# Patient Record
Sex: FEMALE | Race: WHITE | NOT HISPANIC OR LATINO | Employment: FULL TIME | URBAN - METROPOLITAN AREA
[De-identification: names, ages, dates, MRNs, and addresses within clinical notes are randomized per-mention and may not be internally consistent; named-entity substitution may affect disease eponyms.]

---

## 2017-01-09 ENCOUNTER — OFFICE VISIT (OUTPATIENT)
Dept: FAMILY MEDICINE | Facility: CLINIC | Age: 37
End: 2017-01-09
Payer: COMMERCIAL

## 2017-01-09 VITALS
SYSTOLIC BLOOD PRESSURE: 100 MMHG | RESPIRATION RATE: 16 BRPM | BODY MASS INDEX: 28.8 KG/M2 | DIASTOLIC BLOOD PRESSURE: 62 MMHG | HEART RATE: 78 BPM | WEIGHT: 171 LBS

## 2017-01-09 DIAGNOSIS — E03.9 ACQUIRED HYPOTHYROIDISM: ICD-10-CM

## 2017-01-09 LAB
HGB BLD-MCNC: 14 G/DL (ref 11.7–15.7)
TSH SERPL DL<=0.005 MIU/L-ACNC: 0.56 MU/L (ref 0.4–4)

## 2017-01-09 PROCEDURE — 84443 ASSAY THYROID STIM HORMONE: CPT | Mod: 90 | Performed by: PHYSICIAN ASSISTANT

## 2017-01-09 PROCEDURE — 00000218 ZZHCL STATISTIC OBHBG - HEMOGLOBIN: Performed by: PHYSICIAN ASSISTANT

## 2017-01-09 PROCEDURE — 36415 COLL VENOUS BLD VENIPUNCTURE: CPT | Performed by: PHYSICIAN ASSISTANT

## 2017-01-09 PROCEDURE — 99000 SPECIMEN HANDLING OFFICE-LAB: CPT | Performed by: PHYSICIAN ASSISTANT

## 2017-01-09 PROCEDURE — 99207 ZZC POST PARTUM EXAM: CPT | Performed by: PHYSICIAN ASSISTANT

## 2017-01-09 RX ORDER — LEVOTHYROXINE SODIUM 100 UG/1
100 TABLET ORAL DAILY
Qty: 90 TABLET | Refills: 3 | Status: SHIPPED | OUTPATIENT
Start: 2017-01-09 | End: 2018-01-10

## 2017-01-09 ASSESSMENT — PAIN SCALES - GENERAL: PAINLEVEL: NO PAIN (0)

## 2017-01-09 NOTE — PROGRESS NOTES
Quick Note:    Labs normal - continue current dose of Synthroid. I will send refills to your pharmacy.  ______

## 2017-01-09 NOTE — NURSING NOTE
"Chief Complaint   Patient presents with     Post Partum Exam       Initial /62 mmHg  Pulse 78  Resp 16  Wt 171 lb (77.565 kg)  LMP 02/09/2016 (Approximate) Estimated body mass index is 28.8 kg/(m^2) as calculated from the following:    Height as of 4/28/16: 5' 4.6\" (1.641 m).    Weight as of this encounter: 171 lb (77.565 kg).  BP completed using cuff size: regular  Heidy Brumfield CMA (AAMA)   "

## 2017-01-09 NOTE — PROGRESS NOTES
Shirlene is here for a 6-week postpartum checkup.    She had a repeat c/s of a viable boy, weight 7 pounds 7.2 oz., with no complications. Date of delivery was 16. Since delivery, she has not been breast feeding.  She has no signs of infection, bleeding or other complications.  She is not pregnant.  We discussed contraceptions and she has chosen tubal ligation - this was done with csection.      Post partum tubal: Yes  History of Gestational Diabetes? No  Type of Delivery:    Feeding Method:  Formula  If initiated breast feeding and stopped, how long did you breast feed?:  1 week    REVIEW OF SYSTEMS:  Ears/Nose/Throat: negative  Respiratory: negative  Cardiovascular: negative  Gastrointestinal: negative  Genitourinary: negative  Musculoskeletal: negative    Neurologic: negative   Skin: negative   Endocrine:  negative  Vagina: negative  Cervix: negative  Breasts: negative  Vulva: negative      EXAM:  /62 mmHg  Pulse 78  Resp 16  Wt 171 lb (77.565 kg)  LMP 2016 (Approximate)   Patient is in no acute distress.  Cooperative throughout visit.  No signs of self harming behaviours. Normal hygiene & dress. Eye contact normal. Speech/mentation normal.  HEENT: grossly normal.  NECK: no lymphadenopathy or thyroidomegaly.  LUNGS: CTA X 2, no rales or crackles.  BACK: No spinal or CVA tenderness.  HEART: RRR without murmurs clicks or gallops.  ABDOMEN: soft, non tender, good bowel sounds, without masses rebound, guarding or tenderness. Well healed pfannensteil incision.  PELVIC:  External genitalia; normal without lesion.   Speculum exam deferred.   Uterus: non pregnant in size, firm , mobile, no lesions,     Normal shape, position and consistency  Adnexa: non tender, without masses.    EXTREMITIES:  warm to touch, good pulses, no ankle edema or calf tenderness.  NEUROLOGIC: grossly normal.    ASSESSMENT:   6-week postpartum exam after csection.     Routine postpartum follow-up  Acquired  hypothyroidism      PLAN:    Discussed calcium intake, vitamins and supplements  Exercise encouraged  Follow up in 1 year  Hemoglobin obtained  TSH ordered - will adjust Synthroid as needed.    History of depression-she is doing very well postpartum and does not have any concerns today regarding postpartum depression.    Orders Placed This Encounter     OB HEMOGLOBIN     TSH with free T4 reflex       Chart documentation done in part with Dragon Voice recognition Software. Although reviewed after completion, some word and grammatical error may remain.  AVS given to patient upon discharge today.  Electronically signed by Latisha Lara PA-C  January 9, 2017  10:14 AM

## 2017-04-25 ENCOUNTER — HOSPITAL ENCOUNTER (EMERGENCY)
Facility: CLINIC | Age: 37
Discharge: HOME | End: 2017-04-25
Attending: PHYSICIAN ASSISTANT | Admitting: PHYSICIAN ASSISTANT
Payer: COMMERCIAL

## 2017-04-25 VITALS
BODY MASS INDEX: 28.64 KG/M2 | DIASTOLIC BLOOD PRESSURE: 84 MMHG | WEIGHT: 170 LBS | HEART RATE: 99 BPM | OXYGEN SATURATION: 100 % | SYSTOLIC BLOOD PRESSURE: 116 MMHG | TEMPERATURE: 98 F | RESPIRATION RATE: 16 BRPM

## 2017-04-25 DIAGNOSIS — R07.0 THROAT PAIN: ICD-10-CM

## 2017-04-25 PROCEDURE — 99213 OFFICE O/P EST LOW 20 MIN: CPT | Performed by: PHYSICIAN ASSISTANT

## 2017-04-25 PROCEDURE — 87081 CULTURE SCREEN ONLY: CPT | Performed by: PHYSICIAN ASSISTANT

## 2017-04-25 PROCEDURE — 99213 OFFICE O/P EST LOW 20 MIN: CPT

## 2017-04-25 NOTE — ED AVS SNAPSHOT
[facility] Emergency Department    [address] [address]    Phone:  [phone]    Fax:  [phone]                                       Shirlene Rodriguez   MRN: [id number]    Department:  [facility] Emergency Department   Date of Visit:  4/25/2017           After Visit Summary Signature Page     I have received my discharge instructions, and my questions have been answered. I have discussed any challenges I see with this plan with the nurse or doctor.    ..........................................................................................................................................  Patient/Patient Representative Signature      ..........................................................................................................................................  Patient Representative Print Name and Relationship to Patient    ..................................................               ................................................  Date                                            Time    ..........................................................................................................................................  Reviewed by Signature/Title    ...................................................              ..............................................  Date                                                            Time

## 2017-04-25 NOTE — DISCHARGE INSTRUCTIONS
No antibiotic indicated at this time. Will wait for throat culture.     Patient advised to call for any lab results (if obtained during visit) within 2-3 days.     Symptomatic treatment with fluids, rest, salt water gargles, and cool humidifier.  May use acetaminophen, ibuprofen prn.    Patient may return to work/school after 24 hours of antibiotic treatment and fever free for 24 hours.    Return to care if any worsening symptoms or if not improving ([place] may need to be ruled out if symptoms fail to improve).    Patient to go to Emergency Room if drooling, change in voice, difficulty swallowing or talking, or persistent fevers occur.      Patient voiced understanding of instructions given.

## 2017-04-25 NOTE — ED PROVIDER NOTES
History     Chief Complaint   Patient presents with     Pharyngitis     HPI  Shirlene Rodriguez  is a 37 year old female who is here today because of: Sore Throat.  The patient has had symptoms of fever, cough, earache, nausea, headache and myalgias.   Onset of symptoms was 3 days ago. Course of illness is worsening sore throat today.  Patient denies exposure to illness at home or work/school.   Patient denies vomiting, diarrhea, chest congestion, wheezing, hoarseness and fatigue  Treatment measures tried include acetaminophen, ibuprofen.  Patient states her son has bacterial conjunctivitis at home.     Problem list, Medication list, Allergies, and Medical/Social/Surgical histories reviewed in [facility] and updated as appropriate.      Review of Systems     Constitutional, HEENT, cardiovascular, pulmonary, GI and  systems are negative, except as otherwise noted.    Physical Exam   BP: 116/84  Pulse: 99  Temp: 98  F (36.7  C)  Resp: 16  Weight: 77.1 kg (170 lb)  SpO2: 100 %  Physical Exam     /84  Pulse 99  Temp 98  F (36.7  C) (Temporal)  Resp 16  Wt 77.1 kg (170 lb)  SpO2 100%  BMI 28.64 kg/m2  General: healthy, alert with no acute distress, and non toxic in appearance  Eyes - conjunctivae clear.  Ears - External ears normal. Canals clear. TM's normal.  Nose/Sinuses - Nares normal.Mucosa normal. No drainage or sinus tenderness.  Oropharynx - Lips, mucosa, and tongue normal. Positive findings: mild oropharyngeal erythema, no tonsillar hypertrophy or exudates present,   Neck - Neck supple; Positive findings: few anterior cervical nodes, no meningeal signs.   Lungs - Lungs clear; no wheezing or rales.  Heart - regular rate and rhythm. No murmurs, rub.  Abdomen: Abdomen soft, non-tender. BS normal. No masses, organomegaly  SKIN: no suspicious lesions or rashes    Labs:  Rapid Strep test is negative; await throat culture results.  No results found for this or any previous visit (from the past 24  hour(s)).      ED Course     ED Course     Procedures            Critical Care time:  none               Labs Ordered and Resulted from Time of ED Arrival Up to the Time of Departure from the ED - No data to display    Assessments & Plan (with Medical Decision Making)     I have reviewed the nursing notes.    I have reviewed the findings, diagnosis, plan and need for follow up with the patient.    New Prescriptions    No medications on file       Final diagnoses:   Throat pain       4/25/2017   [facility] EMERGENCY DEPARTMENT     Lorraine Meyer PA-C  04/25/17 7715

## 2017-04-25 NOTE — ED AVS SNAPSHOT
[facility] Emergency Department    [address] [address]    Phone:  [phone]    Fax:  [phone]                                       Shirlene Rodriguez   MRN: [id number]    Department:  [facility] Emergency Department   Date of Visit:  4/25/2017           Patient Information     Date Of Birth          1980        Your diagnoses for this visit were:     Throat pain        You were seen by Lorraine Meyer PA-C.      Follow-up Information     Please follow up.    Why:  As needed, If symptoms worsen        Discharge Instructions       No antibiotic indicated at this time. Will wait for throat culture.     Patient advised to call for any lab results (if obtained during visit) within 2-3 days.     Symptomatic treatment with fluids, rest, salt water gargles, and cool humidifier.  May use acetaminophen, ibuprofen prn.    Patient may return to work/school after 24 hours of antibiotic treatment and fever free for 24 hours.    Return to care if any worsening symptoms or if not improving ([place] may need to be ruled out if symptoms fail to improve).    Patient to go to Emergency Room if drooling, change in voice, difficulty swallowing or talking, or persistent fevers occur.      Patient voiced understanding of instructions given.            24 Hour Appointment Hotline       To make an appointment at any [facility], call [phone] ([phone]). If you don't have a family doctor or clinic, we will help you find one. [facility] clinics are conveniently located to serve the needs of you and your family.             Review of your medicines      Our records show that you are taking the medicines listed below. If these are incorrect, please call your family doctor or clinic.        Dose / Directions Last dose taken    levothyroxine 100 MCG tablet   Commonly known as:  SYNTHROID/LEVOTHROID   Dose:  100 mcg   Quantity:  90 tablet        Take 1 tablet (100 mcg) by mouth daily    Refills:  3                Orders Needing Specimen Collection     None      Pending Results     No orders found from 4/23/2017 to 4/26/2017.            Pending Culture Results     No orders found from 4/23/2017 to 4/26/2017.            Test Results From Your Hospital Stay               Thank you for choosing [facility]       Thank you for choosing [facility] for your care. Our goal is always to provide you with excellent care. Hearing back from our patients is one way we can continue to improve our services. Please take a few minutes to complete the written survey that you may receive in the mail after you visit with us. Thank you!        [facility] Information     [employer] gives you secure access to your electronic health record. If you see a primary care provider, you can also send messages to your care team and make appointments. If you have questions, please call your primary care clinic.  If you do not have a primary care provider, please call [phone] and they will assist you.        Care EveryWhere ID     This is your Care EveryWhere ID. This could be used by other organizations to access your [facility] medical records  [id number]        After Visit Summary       This is your record. Keep this with you and show to your community pharmacist(s) and doctor(s) at your next visit.

## 2017-04-27 LAB
BACTERIA SPEC CULT: NORMAL
MICRO REPORT STATUS: NORMAL
SPECIMEN SOURCE: NORMAL

## 2017-04-30 ENCOUNTER — OFFICE VISIT (OUTPATIENT)
Dept: URGENT CARE | Facility: URGENT CARE | Age: 37
End: 2017-04-30
Payer: COMMERCIAL

## 2017-04-30 VITALS
DIASTOLIC BLOOD PRESSURE: 77 MMHG | WEIGHT: 167.2 LBS | HEART RATE: 95 BPM | BODY MASS INDEX: 28.17 KG/M2 | OXYGEN SATURATION: 98 % | SYSTOLIC BLOOD PRESSURE: 115 MMHG | TEMPERATURE: 97.6 F

## 2017-04-30 DIAGNOSIS — H65.191 OTHER ACUTE NONSUPPURATIVE OTITIS MEDIA OF RIGHT EAR, RECURRENCE NOT SPECIFIED: Primary | ICD-10-CM

## 2017-04-30 DIAGNOSIS — J06.9 UPPER RESPIRATORY TRACT INFECTION, UNSPECIFIED TYPE: ICD-10-CM

## 2017-04-30 PROCEDURE — 99213 OFFICE O/P EST LOW 20 MIN: CPT | Performed by: FAMILY MEDICINE

## 2017-04-30 NOTE — NURSING NOTE
"Chief Complaint   Patient presents with     URI     started a week ago. Pt went to the clinic on Tuesday- negative for strep and Wednesday sore throat an ear pain started to get worse.        Initial /77  Pulse 95  Temp 97.6  F (36.4  C) (Oral)  Wt 167 lb 3.2 oz (75.8 kg)  SpO2 98%  BMI 28.17 kg/m2 Estimated body mass index is 28.17 kg/(m^2) as calculated from the following:    Height as of 4/28/16: 5' 4.6\" (1.641 m).    Weight as of this encounter: 167 lb 3.2 oz (75.8 kg).  Medication Reconciliation: complete   Jina Villarreal MA            "

## 2017-04-30 NOTE — PROGRESS NOTES
SUBJECTIVE:                                                    Shirlene Rodriguez is a 37 year old female who presents to clinic today for the following health issues:      RESPIRATORY SYMPTOMS      Duration: 1 week ago    Description  nasal congestion, rhinorrhea, sore throat, cough, ear pain right, headache, fatigue/malaise and hoarse voice    Severity: severe    Accompanying signs and symptoms: None    History (predisposing factors):  none    Precipitating or alleviating factors: None    Therapies tried and outcome:  OTC    She was seen in [facility] last week and strep test was negative    Work in [employer] department, no sick contact    Denies smoking cigarette           Problem list and histories reviewed & adjusted, as indicated.  Additional history: as documented    Patient Active Problem List   Diagnosis     Other acne     GERD (gastroesophageal reflux disease)     CARDIOVASCULAR SCREENING; LDL GOAL LESS THAN 160     Abnormal Pap smear of cervix     Major depression in complete remission (H)     History of      Anxiety     Acne     Acquired hypothyroidism     Mild intermittent asthma, uncomplicated     Past Surgical History:   Procedure Laterality Date     CARDIAC STRESS TST,COMPLETE      Normal      SECTION  2012    Procedure:  SECTION;   SECTION;  Surgeon: Warner Ac MD;  Location:  [facility]      SECTION, TUBAL LIGATION, COMBINED N/A 2016    Procedure: COMBINED  SECTION, TUBAL LIGATION;  Surgeon: Warner Ac MD;  Location:  [facility]     GYN SURGERY  2012    C section     SURGICAL HISTORY OF -       root canal       Social History   Substance Use Topics     Smoking status: Never Smoker     Smokeless tobacco: Never Used     Alcohol use No      Comment: occasinal     Family History   Problem Relation Age of Onset     Alzheimer Disease Paternal Grandfather      Prostate Cancer Father      Alcohol/Drug Mother      alcoholic          Current Outpatient Prescriptions   Medication Sig Dispense Refill     levothyroxine (SYNTHROID/LEVOTHROID) 100 MCG tablet Take 1 tablet (100 mcg) by mouth daily 90 tablet 3     Allergies   Allergen Reactions     No Known Allergies      Recent Labs   Lab Test  01/09/17   1014  11/07/16   1124  10/26/16   0807   05/06/14   1053   LDL   --    --    --    --   86   HDL   --    --    --    --   91   TRIG   --    --    --    --   64   CR   --   0.72   --    --    --    GFRESTIMATED   --   >90  Non  GFR Calc     --    --    --    GFRESTBLACK   --   >90   GFR Calc     --    --    --    TSH  0.56   --   3.04   < >  1.01    < > = values in this interval not displayed.      BP Readings from Last 3 Encounters:   04/30/17 115/77   04/25/17 116/84   01/09/17 100/62    Wt Readings from Last 3 Encounters:   04/30/17 167 lb 3.2 oz (75.8 kg)   04/25/17 170 lb (77.1 kg)   01/09/17 171 lb (77.6 kg)                  Labs reviewed in EPIC    Reviewed and updated as needed this visit by clinical staff  Tobacco  Allergies       Reviewed and updated as needed this visit by Provider         ROS:  Constitutional, HEENT, cardiovascular, pulmonary, gi and gu systems are negative, except as otherwise noted.    OBJECTIVE:                                                    /77  Pulse 95  Temp 97.6  F (36.4  C) (Oral)  Wt 167 lb 3.2 oz (75.8 kg)  SpO2 98%  BMI 28.17 kg/m2  Body mass index is 28.17 kg/(m^2).  GENERAL: healthy, alert and no distress  EYES: Eyes grossly normal to inspection, PERRL and conjunctivae and sclerae normal  HENT: normal cephalic/atraumatic, right ear: erythematous and bulging membrane, left ear: clear effusion and oropharxnx crowded  NECK: no adenopathy, no asymmetry, masses, or scars and thyroid normal to palpation  RESP: lungs clear to auscultation - no rales, rhonchi or wheezes  CV: regular rate and rhythm, normal S1 S2, no S3 or S4, no murmur, click or rub, no  peripheral edema and peripheral pulses strong  ABDOMEN: soft, nontender, no hepatosplenomegaly, no masses and bowel sounds normal  MS: no gross musculoskeletal defects noted, no edema     ASSESSMENT/PLAN:                                                        ICD-10-CM    1. Other acute nonsuppurative otitis media of right ear, recurrence not specified H65.191 amoxicillin-clavulanate (AUGMENTIN) 875-125 MG per tablet   2. Upper respiratory tract infection, unspecified type J06.9        Discussed in detail differentials and further management. Symptoms are likely secondary to acute otitis media and URI unspecified. Augmentin prescribed, common side effects discussed. Recommended well hydration, over-the-counter analgesia, warm fluids and saline gargles. Written instructions/information provided. Patient understood and in agreement with the above plan. All questions are answered. Follow-up if symptoms persist or worsen.      MEDICATIONS:   Orders Placed This Encounter   Medications     amoxicillin-clavulanate (AUGMENTIN) 875-125 MG per tablet     Sig: Take 1 tablet by mouth 2 times daily     Dispense:  20 tablet     Refill:  0     Patient Instructions     Middle Ear Infection (Adult)  You have an infection of the middle ear (the space behind the eardrum). This is also called acute otitis media (AOM). Sometimes it is caused by the common cold. This is because congestion can block the internal passage (eustachian tube) that drains fluid from the middle ear. When the middle ear fills with fluid, bacteria can grow there and cause an infection. Oral antibiotics are used to treat this illness, not ear drops. Symptoms usually start to improve within 1 to 2 days of treatment.    Home care  The following are general care guidelines:    Finish all of the antibiotic medicine given, even though you may feel better after the first few days.    You may use acetaminophen or ibuprofen to control pain, unless something else was  prescribed. [NOTE: If you have chronic liver or kidney disease or have ever had a stomach ulcer or GI bleeding, talk with your doctor before using these medicines.] Do not give aspirin to anyone under 18 years of age who has a fever. It may cause severe liver damage.  Follow-up care  Follow up with your doctor in 2 weeks if all symptoms have not gotten better, or if hearing doesn't go back to normal within 1 month.  When to seek medical care  Get prompt medical attention if any of the following occur:    Ear pain gets worse or does not improve after 3 days of treatment    Unusual drowsiness or confusion    Neck pain, stiff neck, or headache    Fluid or blood draining from the ear canal    Fever of 100.4 F (38 C) or higher after 3 days of antibiotics, or as directed by your health care provider    Convulsion (seizure)    [id number] [employer]. [address] [place] [address]. All rights reserved. This information is not intended as a substitute for professional medical care. Always follow your healthcare professional's instructions.        Patient Education    Amoxicillin Trihydrate, Clavulanate Potassium Chewable tablet    Amoxicillin Trihydrate, Clavulanate Potassium Oral suspension    Amoxicillin Trihydrate, Clavulanate Potassium Oral tablet    Amoxicillin Trihydrate, Clavulanate Potassium Oral tablet, extended-release  Amoxicillin Trihydrate, Clavulanate Potassium Oral tablet  What is this medicine?  AMOXICILLIN; CLAVULANIC ACID (a mox i MARJAN in; FRANCISCO rosales AS id) is a penicillin antibiotic. It is used to treat certain kinds of bacterial infections. It will not work for colds, flu, or other viral infections.  This medicine may be used for other purposes; ask your health care provider or pharmacist if you have questions.  What should I tell my health care provider before I take this medicine?  They need to know if you have any of these conditions:    bowel disease, like  colitis    kidney disease    liver disease    mononucleosis    an unusual or allergic reaction to amoxicillin, penicillin, cephalosporin, other antibiotics, clavulanic acid, other medicines, foods, dyes, or preservatives    pregnant or trying to get pregnant    breast-feeding  How should I use this medicine?  Take this medicine by mouth with a full glass of water. Follow the directions on the prescription label. Take at the start of a meal. Do not crush or chew. If the tablet has a score line, you may cut it in half at the score line for easier swallowing. Take your medicine at regular intervals. Do not take your medicine more often than directed. Take all of your medicine as directed even if you think you are better. Do not skip doses or stop your medicine early.  Talk to your pediatrician regarding the use of this medicine in children. Special care may be needed.  Overdosage: If you think you have taken too much of this medicine contact a poison control center or emergency room at once.  NOTE: This medicine is only for you. Do not share this medicine with others.  What if I miss a dose?  If you miss a dose, take it as soon as you can. If it is almost time for your next dose, take only that dose. Do not take double or extra doses.  What may interact with this medicine?    allopurinol    anticoagulants    birth control pills    methotrexate    probenecid  This list may not describe all possible interactions. Give your health care provider a list of all the medicines, herbs, non-prescription drugs, or dietary supplements you use. Also tell them if you smoke, drink alcohol, or use illegal drugs. Some items may interact with your medicine.  What should I watch for while using this medicine?  Tell your doctor or health care professional if your symptoms do not improve.  Do not treat diarrhea with over the counter products. Contact your doctor if you have diarrhea that lasts more than 2 days or if it is severe and  watery.  If you have diabetes, you may get a false-positive result for sugar in your urine. Check with your doctor or health care professional.  Birth control pills may not work properly while you are taking this medicine. Talk to your doctor about using an extra method of birth control.  What side effects may I notice from receiving this medicine?  Side effects that you should report to your doctor or health care professional as soon as possible:    allergic reactions like skin rash, itching or hives, swelling of the face, lips, or tongue    breathing problems    dark urine    fever or chills, sore throat    redness, blistering, peeling or loosening of the skin, including inside the mouth    seizures    trouble passing urine or change in the amount of urine    unusual bleeding, bruising    unusually weak or tired    white patches or sores in the mouth or throat  Side effects that usually do not require medical attention (report to your doctor or health care professional if they continue or are bothersome):    diarrhea    dizziness    headache    nausea, vomiting    stomach upset    vaginal or anal irritation  This list may not describe all possible side effects. Call your doctor for medical advice about side effects. You may report side effects to FDA at 1-275-FDA-9156.  Where should I keep my medicine?  Keep out of the reach of children.  Store at room temperature below 25 degrees C (77 degrees F). Keep container tightly closed. Throw away any unused medicine after the expiration date.  NOTE:This sheet is a summary. It may not cover all possible information. If you have questions about this medicine, talk to your doctor, pharmacist, or health care provider. Copyright  2016 [employer]            Vitor Fowler MD  [facility]

## 2017-04-30 NOTE — PATIENT INSTRUCTIONS
Middle Ear Infection (Adult)  You have an infection of the middle ear (the space behind the eardrum). This is also called acute otitis media (AOM). Sometimes it is caused by the common cold. This is because congestion can block the internal passage (eustachian tube) that drains fluid from the middle ear. When the middle ear fills with fluid, bacteria can grow there and cause an infection. Oral antibiotics are used to treat this illness, not ear drops. Symptoms usually start to improve within 1 to 2 days of treatment.    Home care  The following are general care guidelines:    Finish all of the antibiotic medicine given, even though you may feel better after the first few days.    You may use acetaminophen or ibuprofen to control pain, unless something else was prescribed. [NOTE: If you have chronic liver or kidney disease or have ever had a stomach ulcer or GI bleeding, talk with your doctor before using these medicines.] Do not give aspirin to anyone under 18 years of age who has a fever. It may cause severe liver damage.  Follow-up care  Follow up with your doctor in 2 weeks if all symptoms have not gotten better, or if hearing doesn't go back to normal within 1 month.  When to seek medical care  Get prompt medical attention if any of the following occur:    Ear pain gets worse or does not improve after 3 days of treatment    Unusual drowsiness or confusion    Neck pain, stiff neck, or headache    Fluid or blood draining from the ear canal    Fever of 100.4 F (38 C) or higher after 3 days of antibiotics, or as directed by your health care provider    Convulsion (seizure)    1490-0374 [employer]. [address] [place] [address], [place] [address]. All rights reserved. This information is not intended as a substitute for professional medical care. Always follow your healthcare professional's instructions.        Patient Education    Amoxicillin Trihydrate, Clavulanate Potassium Chewable tablet    Amoxicillin  Trihydrate, Clavulanate Potassium Oral suspension    Amoxicillin Trihydrate, Clavulanate Potassium Oral tablet    Amoxicillin Trihydrate, Clavulanate Potassium Oral tablet, extended-release  Amoxicillin Trihydrate, Clavulanate Potassium Oral tablet  What is this medicine?  AMOXICILLIN; CLAVULANIC ACID (a mox i MARJAN in; FRANCISCO torres sarah ic AS id) is a penicillin antibiotic. It is used to treat certain kinds of bacterial infections. It will not work for colds, flu, or other viral infections.  This medicine may be used for other purposes; ask your health care provider or pharmacist if you have questions.  What should I tell my health care provider before I take this medicine?  They need to know if you have any of these conditions:    bowel disease, like colitis    kidney disease    liver disease    mononucleosis    an unusual or allergic reaction to amoxicillin, penicillin, cephalosporin, other antibiotics, clavulanic acid, other medicines, foods, dyes, or preservatives    pregnant or trying to get pregnant    breast-feeding  How should I use this medicine?  Take this medicine by mouth with a full glass of water. Follow the directions on the prescription label. Take at the start of a meal. Do not crush or chew. If the tablet has a score line, you may cut it in half at the score line for easier swallowing. Take your medicine at regular intervals. Do not take your medicine more often than directed. Take all of your medicine as directed even if you think you are better. Do not skip doses or stop your medicine early.  Talk to your pediatrician regarding the use of this medicine in children. Special care may be needed.  Overdosage: If you think you have taken too much of this medicine contact a poison control center or emergency room at once.  NOTE: This medicine is only for you. Do not share this medicine with others.  What if I miss a dose?  If you miss a dose, take it as soon as you can. If it is almost time for your next  dose, take only that dose. Do not take double or extra doses.  What may interact with this medicine?    allopurinol    anticoagulants    birth control pills    methotrexate    probenecid  This list may not describe all possible interactions. Give your health care provider a list of all the medicines, herbs, non-prescription drugs, or dietary supplements you use. Also tell them if you smoke, drink alcohol, or use illegal drugs. Some items may interact with your medicine.  What should I watch for while using this medicine?  Tell your doctor or health care professional if your symptoms do not improve.  Do not treat diarrhea with over the counter products. Contact your doctor if you have diarrhea that lasts more than 2 days or if it is severe and watery.  If you have diabetes, you may get a false-positive result for sugar in your urine. Check with your doctor or health care professional.  Birth control pills may not work properly while you are taking this medicine. Talk to your doctor about using an extra method of birth control.  What side effects may I notice from receiving this medicine?  Side effects that you should report to your doctor or health care professional as soon as possible:    allergic reactions like skin rash, itching or hives, swelling of the face, lips, or tongue    breathing problems    dark urine    fever or chills, sore throat    redness, blistering, peeling or loosening of the skin, including inside the mouth    seizures    trouble passing urine or change in the amount of urine    unusual bleeding, bruising    unusually weak or tired    white patches or sores in the mouth or throat  Side effects that usually do not require medical attention (report to your doctor or health care professional if they continue or are bothersome):    diarrhea    dizziness    headache    nausea, vomiting    stomach upset    vaginal or anal irritation  This list may not describe all possible side effects. Call your  doctor for medical advice about side effects. You may report side effects to FDA at 8-438-FFU-6984.  Where should I keep my medicine?  Keep out of the reach of children.  Store at room temperature below 25 degrees C (77 degrees F). Keep container tightly closed. Throw away any unused medicine after the expiration date.  NOTE:This sheet is a summary. It may not cover all possible information. If you have questions about this medicine, talk to your doctor, pharmacist, or health care provider. Copyright  2016 [employer]

## 2017-04-30 NOTE — MR AVS SNAPSHOT
After Visit Summary   4/30/2017    Shirlene Rodriguez    MRN: [id number]           Patient Information     Date Of Birth          1980        Visit Information        Provider Department      4/30/2017 9:55 AM Vitor Fowler MD [facility]        Today's Diagnoses     Other acute nonsuppurative otitis media of right ear, recurrence not specified    -  1    Upper respiratory tract infection, unspecified type          Care Instructions      Middle Ear Infection (Adult)  You have an infection of the middle ear (the space behind the eardrum). This is also called acute otitis media (AOM). Sometimes it is caused by the common cold. This is because congestion can block the internal passage (eustachian tube) that drains fluid from the middle ear. When the middle ear fills with fluid, bacteria can grow there and cause an infection. Oral antibiotics are used to treat this illness, not ear drops. Symptoms usually start to improve within 1 to 2 days of treatment.    Home care  The following are general care guidelines:    Finish all of the antibiotic medicine given, even though you may feel better after the first few days.    You may use acetaminophen or ibuprofen to control pain, unless something else was prescribed. [NOTE: If you have chronic liver or kidney disease or have ever had a stomach ulcer or GI bleeding, talk with your doctor before using these medicines.] Do not give aspirin to anyone under 18 years of age who has a fever. It may cause severe liver damage.  Follow-up care  Follow up with your doctor in 2 weeks if all symptoms have not gotten better, or if hearing doesn't go back to normal within 1 month.  When to seek medical care  Get prompt medical attention if any of the following occur:    Ear pain gets worse or does not improve after 3 days of treatment    Unusual drowsiness or confusion    Neck pain, stiff neck, or headache    Fluid or blood draining from the ear  canal    Fever of 100.4 F (38 C) or higher after 3 days of antibiotics, or as directed by your health care provider    Convulsion (seizure)    5985-0364 [facility]. [address] [place] [address], [place] [address]. All rights reserved. This information is not intended as a substitute for professional medical care. Always follow your healthcare professional's instructions.        Patient Education    Amoxicillin Trihydrate, Clavulanate Potassium Chewable tablet    Amoxicillin Trihydrate, Clavulanate Potassium Oral suspension    Amoxicillin Trihydrate, Clavulanate Potassium Oral tablet    Amoxicillin Trihydrate, Clavulanate Potassium Oral tablet, extended-release  Amoxicillin Trihydrate, Clavulanate Potassium Oral tablet  What is this medicine?  AMOXICILLIN; CLAVULANIC ACID (a mox i MARJAN in; FRANCISCO rosales AS id) is a penicillin antibiotic. It is used to treat certain kinds of bacterial infections. It will not work for colds, flu, or other viral infections.  This medicine may be used for other purposes; ask your health care provider or pharmacist if you have questions.  What should I tell my health care provider before I take this medicine?  They need to know if you have any of these conditions:    bowel disease, like colitis    kidney disease    liver disease    mononucleosis    an unusual or allergic reaction to amoxicillin, penicillin, cephalosporin, other antibiotics, clavulanic acid, other medicines, foods, dyes, or preservatives    pregnant or trying to get pregnant    breast-feeding  How should I use this medicine?  Take this medicine by mouth with a full glass of water. Follow the directions on the prescription label. Take at the start of a meal. Do not crush or chew. If the tablet has a score line, you may cut it in half at the score line for easier swallowing. Take your medicine at regular intervals. Do not take your medicine more often than directed. Take all of your medicine as directed even if you  think you are better. Do not skip doses or stop your medicine early.  Talk to your pediatrician regarding the use of this medicine in children. Special care may be needed.  Overdosage: If you think you have taken too much of this medicine contact a poison control center or emergency room at once.  NOTE: This medicine is only for you. Do not share this medicine with others.  What if I miss a dose?  If you miss a dose, take it as soon as you can. If it is almost time for your next dose, take only that dose. Do not take double or extra doses.  What may interact with this medicine?    allopurinol    anticoagulants    birth control pills    methotrexate    probenecid  This list may not describe all possible interactions. Give your health care provider a list of all the medicines, herbs, non-prescription drugs, or dietary supplements you use. Also tell them if you smoke, drink alcohol, or use illegal drugs. Some items may interact with your medicine.  What should I watch for while using this medicine?  Tell your doctor or health care professional if your symptoms do not improve.  Do not treat diarrhea with over the counter products. Contact your doctor if you have diarrhea that lasts more than 2 days or if it is severe and watery.  If you have diabetes, you may get a false-positive result for sugar in your urine. Check with your doctor or health care professional.  Birth control pills may not work properly while you are taking this medicine. Talk to your doctor about using an extra method of birth control.  What side effects may I notice from receiving this medicine?  Side effects that you should report to your doctor or health care professional as soon as possible:    allergic reactions like skin rash, itching or hives, swelling of the face, lips, or tongue    breathing problems    dark urine    fever or chills, sore throat    redness, blistering, peeling or loosening of the skin, including inside the  mouth    seizures    trouble passing urine or change in the amount of urine    unusual bleeding, bruising    unusually weak or tired    white patches or sores in the mouth or throat  Side effects that usually do not require medical attention (report to your doctor or health care professional if they continue or are bothersome):    diarrhea    dizziness    headache    nausea, vomiting    stomach upset    vaginal or anal irritation  This list may not describe all possible side effects. Call your doctor for medical advice about side effects. You may report side effects to FDA at 6-094-QYC-2727.  Where should I keep my medicine?  Keep out of the reach of children.  Store at room temperature below 25 degrees C (77 degrees F). Keep container tightly closed. Throw away any unused medicine after the expiration date.  NOTE:This sheet is a summary. It may not cover all possible information. If you have questions about this medicine, talk to your doctor, pharmacist, or health care provider. Copyright  2016 [employer] Standard              Follow-ups after your visit        Who to contact     If you have questions or need follow up information about today's clinic visit or your schedule please contact [facility] directly at [phone].  Normal or non-critical lab and imaging results will be communicated to you by [employer], letter or phone within 4 business days after the clinic has received the results. If you do not hear from us within 7 days, please contact the clinic through [employer] or phone. If you have a critical or abnormal lab result, we will notify you by phone as soon as possible.  Submit refill requests through "[employer] or call your pharmacy and they will forward the refill request to us. Please allow 3 business days for your refill to be completed.          Additional Information About Your Visit        "[employer] Information     "[employer] gives you secure access to your electronic health record. If you see  a primary care provider, you can also send messages to your care team and make appointments. If you have questions, please call your primary care clinic.  If you do not have a primary care provider, please call [phone] and they will assist you.        Care EveryWhere ID     This is your Care EveryWhere ID. This could be used by other organizations to access your [facility] medical records  [id number]        Your Vitals Were     Pulse Temperature Pulse Oximetry BMI (Body Mass Index)          95 97.6  F (36.4  C) (Oral) 98% 28.17 kg/m2         Blood Pressure from Last 3 Encounters:   04/30/17 115/77   04/25/17 116/84   01/09/17 100/62    Weight from Last 3 Encounters:   04/30/17 167 lb 3.2 oz (75.8 kg)   04/25/17 170 lb (77.1 kg)   01/09/17 171 lb (77.6 kg)              Today, you had the following     No orders found for display         Today's Medication Changes          These changes are accurate as of: 4/30/17 10:53 AM.  If you have any questions, ask your nurse or doctor.               Start taking these medicines.        Dose/Directions    amoxicillin-clavulanate 875-125 MG per tablet   Commonly known as:  AUGMENTIN   Used for:  Other acute nonsuppurative otitis media of right ear, recurrence not specified   Started by:  Vitor Fowler MD        Dose:  1 tablet   Take 1 tablet by mouth 2 times daily   Quantity:  20 tablet   Refills:  0            Where to get your medicines      These medications [place] [facility] [place] - [address]  [address], [facility] [address]     Phone:  [phone]     amoxicillin-clavulanate 875-125 MG per tablet                Primary Care Provider Office Phone # Fax #    Latisha Lara PA-C [phone]       [address] [place] [address]        Thank you!     Thank you for choosing [facility]  for your care. Our goal is always to provide you with  excellent care. Hearing back from our patients is one way we can continue to improve our services. Please take a few minutes to complete the written survey that you may receive in the mail after your visit with us. Thank you!             Your Updated Medication List - Protect others around you: Learn how to safely use, store and throw away your medicines at www.disposemymeds.org.          This list is accurate as of: 4/30/17 10:53 AM.  Always use your most recent med list.                   Brand Name Dispense Instructions for use    amoxicillin-clavulanate 875-125 MG per tablet    AUGMENTIN    20 tablet    Take 1 tablet by mouth 2 times daily       levothyroxine 100 MCG tablet    SYNTHROID/LEVOTHROID    90 tablet    Take 1 tablet (100 mcg) by mouth daily

## 2017-08-12 ENCOUNTER — HEALTH MAINTENANCE LETTER (OUTPATIENT)
Age: 37
End: 2017-08-12

## 2017-10-18 ENCOUNTER — TELEPHONE (OUTPATIENT)
Dept: FAMILY MEDICINE | Facility: CLINIC | Age: 37
End: 2017-10-18

## 2017-10-18 DIAGNOSIS — J45.20 MILD INTERMITTENT ASTHMA, UNCOMPLICATED: ICD-10-CM

## 2017-10-18 DIAGNOSIS — F32.5 MAJOR DEPRESSION IN COMPLETE REMISSION (H): Primary | ICD-10-CM

## 2017-10-18 NOTE — TELEPHONE ENCOUNTER
Panel Management Review      Patient has the following on her problem list:     Depression / Dysthymia review    Measure:  Needs PHQ-9 score of 4 or less during index window.  Administer PHQ-9 and if score is 5 or more, send encounter to provider for next steps.    5   7 month window range:       PHQ-9 SCORE 9/14/2015 4/28/2016 10/26/2016   Total Score - - -   Total Score 5 5 5       If PHQ-9 recheck is 5 or more, route to provider for next steps.    Patient is due for:  PHQ9 and DAP    Asthma review     ACT Total Scores 10/26/2016   ACT TOTAL SCORE -   ASTHMA ER VISITS -   ASTHMA HOSPITALIZATIONS -   ACT TOTAL SCORE (Goal Greater than or Equal to 20) 23   In the past 12 months, how many times did you visit the emergency room for your asthma without being admitted to the hospital? 0   In the past 12 months, how many times were you hospitalized overnight because of your asthma? 0      1. Is Asthma diagnosis on the Problem List? Yes    2. Is Asthma listed on Health Maintenance? Yes    3. Patient is due for:  ACT and AAP          Composite cancer screening  Chart review shows that this patient is due/due soon for the following Pap Smear  Summary:    Patient is due/failing the following:   AAP, ACT, DAP, PAP and PHQ9    Action needed:   Patient needs office visit for pap and ACT .    Type of outreach:    Sent [employer] message.    Questions for provider review:    None                                                                                                                                    Kristin Gasetlum MA        Chart routed to Care Team .

## 2017-11-14 ASSESSMENT — ASTHMA QUESTIONNAIRES: ACT_TOTALSCORE: 23

## 2018-01-10 DIAGNOSIS — E03.9 ACQUIRED HYPOTHYROIDISM: ICD-10-CM

## 2018-01-10 NOTE — TELEPHONE ENCOUNTER
"Requested Prescriptions   Pending Prescriptions Disp Refills     levothyroxine (SYNTHROID/LEVOTHROID) 100 MCG tablet [Pharmacy Med Name: LEVOTHYROXINE 100MCG TAB] 90 tablet 3     Sig: TAKE ONE TABLET BY MOUTH EVERY DAY    Thyroid Protocol Failed    1/10/2018  3:34 PM       Failed - Recent or future visit with authorizing provider's specialty    Patient had office visit in the last year or has a visit in the next 30 days with authorizing provider.  See \"Patient Info\" tab in inbasket, or \"Choose Columns\" in Meds & Orders section of the refill encounter.              Failed - Normal TSH on file in past 12 months    Recent Labs   Lab Test  01/09/17   1014   TSH  0.56             Passed - Patient is 12 years or older       Passed - No active pregnancy on record    If patient is pregnant or has had a positive pregnancy test, please check TSH.         Passed - No positive pregnancy test in past 12 months    If patient is pregnant or has had a positive pregnancy test, please check TSH.          Last Written Prescription Date:  1/9/17  Last Fill Quantity: 90,  # refills: 3   Last Office Visit with [facility], P or [facility] prescribing provider:  1/9/17   Future Office Visit:       "

## 2018-01-11 RX ORDER — LEVOTHYROXINE SODIUM 100 UG/1
TABLET ORAL
Qty: 90 TABLET | Refills: 0 | Status: SHIPPED | OUTPATIENT
Start: 2018-01-11 | End: 2018-04-24

## 2018-01-11 NOTE — TELEPHONE ENCOUNTER
Medication is being filled for 1 time refill only  Of 90 days due to:  Patient needs labs TSH. Future labs ordered as listed. Patient needs to be seen because it has been more than one year since last visit.  Notified per comment section of RX....................JOCE Mays

## 2018-01-17 ENCOUNTER — OFFICE VISIT (OUTPATIENT)
Dept: URGENT CARE | Facility: URGENT CARE | Age: 38
End: 2018-01-17
Payer: COMMERCIAL

## 2018-01-17 DIAGNOSIS — R68.89 FLU-LIKE SYMPTOMS: ICD-10-CM

## 2018-01-17 DIAGNOSIS — N30.00 ACUTE CYSTITIS WITHOUT HEMATURIA: ICD-10-CM

## 2018-01-17 DIAGNOSIS — R30.0 DYSURIA: Primary | ICD-10-CM

## 2018-01-17 LAB
ALBUMIN UR-MCNC: 30 MG/DL
APPEARANCE UR: CLEAR
BACTERIA #/AREA URNS HPF: ABNORMAL /HPF
BILIRUB UR QL STRIP: ABNORMAL
COLOR UR AUTO: YELLOW
GLUCOSE UR STRIP-MCNC: NEGATIVE MG/DL
HGB UR QL STRIP: ABNORMAL
KETONES UR STRIP-MCNC: 15 MG/DL
LEUKOCYTE ESTERASE UR QL STRIP: ABNORMAL
NITRATE UR QL: NEGATIVE
NON-SQ EPI CELLS #/AREA URNS LPF: ABNORMAL /LPF
PH UR STRIP: 5.5 PH (ref 5–7)
RBC #/AREA URNS AUTO: ABNORMAL /HPF
SOURCE: ABNORMAL
SP GR UR STRIP: 1.02 (ref 1–1.03)
UROBILINOGEN UR STRIP-ACNC: 0.2 EU/DL (ref 0.2–1)
WBC #/AREA URNS AUTO: ABNORMAL /HPF

## 2018-01-17 PROCEDURE — 99214 OFFICE O/P EST MOD 30 MIN: CPT | Performed by: NURSE PRACTITIONER

## 2018-01-17 PROCEDURE — 81001 URINALYSIS AUTO W/SCOPE: CPT | Performed by: NURSE PRACTITIONER

## 2018-01-17 RX ORDER — OSELTAMIVIR PHOSPHATE 75 MG/1
75 CAPSULE ORAL 2 TIMES DAILY
Qty: 10 CAPSULE | Refills: 0 | Status: SHIPPED | OUTPATIENT
Start: 2018-01-17 | End: 2018-01-22

## 2018-01-17 RX ORDER — SULFAMETHOXAZOLE/TRIMETHOPRIM 800-160 MG
1 TABLET ORAL 2 TIMES DAILY
Qty: 6 TABLET | Refills: 0 | Status: SHIPPED | OUTPATIENT
Start: 2018-01-17 | End: 2018-01-20

## 2018-01-17 NOTE — PROGRESS NOTES
SUBJECTIVE:   Shirlene Rodriguez is a 38 year old female who presents to clinic today for the following health issues:    RESPIRATORY SYMPTOMS      Duration: 2.5 days    Description  cough, fever, chills and body aches    Severity: severe    Accompanying signs and symptoms: None    History (predisposing factors):  none    Precipitating or alleviating factors: None    Therapies tried and outcome:  none    URINARY TRACT SYMPTOMS      Duration: 4 days    Description  frequency, urgency, odor and burning    Intensity:  moderate    Accompanying signs and symptoms:  Fever/chills: YES  Flank pain YES- on side stomach  Nausea and vomiting: YES  Vaginal symptoms: odor and burning  Abdominal/Pelvic Pain: YES    History  History of frequent UTI's: no   History of kidney stones: no   Sexually Active: YES  Possibility of pregnancy: No    Precipitating or alleviating factors: None    Therapies tried and outcome: cranberry juice  and increase fluid intake   Outcome: helping a little bit     Problem list and histories reviewed & adjusted, as indicated.  Additional history: as documented    Patient Active Problem List   Diagnosis     Other acne     GERD (gastroesophageal reflux disease)     CARDIOVASCULAR SCREENING; LDL GOAL LESS THAN 160     Abnormal Pap smear of cervix     Major depression in complete remission (H)     History of      Anxiety     Acne     Acquired hypothyroidism     Mild intermittent asthma, uncomplicated     Past Surgical History:   Procedure Laterality Date     CARDIAC STRESS TST,COMPLETE      Normal      SECTION  2012    Procedure:  SECTION;   SECTION;  Surgeon: Warner Ac MD;  Location:  [facility]      SECTION, TUBAL LIGATION, COMBINED N/A 2016    Procedure: COMBINED  SECTION, TUBAL LIGATION;  Surgeon: Warner Ac MD;  Location:  [facility]     GYN SURGERY  2012    C section     SURGICAL HISTORY OF -       root canal        Social History   Substance Use Topics     Smoking status: Never Smoker     Smokeless tobacco: Never Used     Alcohol use No      Comment: occasinal     Family History   Problem Relation Age of Onset     Alzheimer Disease Paternal Grandfather      Prostate Cancer Father      Alcohol/Drug Mother      alcoholic         Current Outpatient Prescriptions   Medication Sig Dispense Refill     sulfamethoxazole-trimethoprim (BACTRIM DS/SEPTRA DS) 800-160 MG per tablet Take 1 tablet by mouth 2 times daily for 3 days 6 tablet 0     oseltamivir (TAMIFLU) 75 MG capsule Take 1 capsule (75 mg) by mouth 2 times daily for 5 days 10 capsule 0     levothyroxine (SYNTHROID/LEVOTHROID) 100 MCG tablet TAKE ONE TABLET BY MOUTH EVERY DAY 90 tablet 0     amoxicillin-clavulanate (AUGMENTIN) 875-125 MG per tablet Take 1 tablet by mouth 2 times daily 20 tablet 0     Allergies   Allergen Reactions     No Known Allergies          Reviewed and updated as needed this visit by clinical staff     Reviewed and updated as needed this visit by Provider         ROS:  C: positive for fever, chills, negative for change in weight  Eyes : Negative  INTEGUMENTARY/SKIN: NEGATIVE for worrisome rashes, moles or lesions  ENT/MOUTH: as per HPI  RESP: as per HPI  CV: NEGATIVE for chest pain, palpitations or peripheral edema  : dysuria, frequency and odor   MUSCULOSKELETAL: NEGATIVE for significant arthralgias or myalgia    OBJECTIVE:     There were no vitals taken for this visit.  There is no height or weight on file to calculate BMI.  GENERAL: healthy, alert and no distress  HENT: normal cephalic/atraumatic, ear canals and TM's normal, nasal mucosa edematous  and rhinorrhea clear  NECK: no adenopathy, no asymmetry, masses, or scars and thyroid normal to palpation  RESP: lungs clear to auscultation - no rales, rhonchi or wheezes  CV: regular rate and rhythm, normal S1 S2, no S3 or S4, no murmur, click or rub, no peripheral edema and peripheral pulses  strong  ABDOMEN: soft, nontender, no hepatosplenomegaly, no masses and bowel sounds normal  MS: no gross musculoskeletal defects noted, no edema    Diagnostic Test Results:  none     ASSESSMENT/PLAN:       ICD-10-CM    1. Dysuria R30.0 UA reflex to Microscopic and Culture     Urine Microscopic   2. Acute cystitis without hematuria N30.00 sulfamethoxazole-trimethoprim (BACTRIM DS/SEPTRA DS) 800-160 MG per tablet   3. Flu-like symptoms R68.89 oseltamivir (TAMIFLU) 75 MG capsule     I discussed the pathophysiology of influenza and viral etiology.  I reviewed the risks and benefits of various over the counter and prescription medications.  Additionally, we reviewed the infectious nature of this condition and techniques to minimize transmission and future infections.   I recommend follow up with PCP in 3 days or sooner if symptoms are getting worse  Side effects of medications discussed  Over the counter medications discussed  All questions are answered and patient is in agreement with treatment plan  Hailey Iqbal  FN-BC  Family Nurse Practitoner

## 2018-01-17 NOTE — PATIENT INSTRUCTIONS
Understanding Urinary Tract Infections (UTIs)  Most UTIs are caused by bacteria, although they may also be caused by viruses or fungi. Bacteria from the bowel are the most common source of infection. The infection may start because of any of the following:    Sexual activity. During sex, bacteria can travel from the penis, vagina, or rectum into the urethra.     Bacteria on the skin outside the rectum may travel into the urethra. This is more common in women since the rectum and urethra are closer to each other than in men. Wiping from front to back after using the toilet and keeping the area clean can help prevent germs from getting to the urethra.    Blockage of urine flow through the urinary tract. If urine sits too long, germs may start to grow out of control.      Parts of the urinary tract  The infection can occur in any part of the urinary tract.    The kidneys collect and store urine.    The ureters carry urine from the kidneys to the bladder.    The bladder holds urine until you are ready to let it out.    The urethra carries urine from the bladder out of the body. It is shorter in women, so bacteria can move through it more easily. The urethra is longer in men, so a UTI is less likely to reach the bladder or kidneys in men.  Date Last Reviewed: 1/1/2017 2000-2017 [employer]. [address] [place] [address], [place] [address]. All rights reserved. This information is not intended as a substitute for professional medical care. Always follow your healthcare professional's instructions.        The Flu (Influenza)     The virus that causes the flu spreads through the air in droplets when someone who has the flu coughs, sneezes, laughs, or talks.   The flu (influenza) is an infection that affects your respiratory tract. This tract is made up of your mouth, nose, and lungs, and the passages between them. Unlike a cold, the flu can make you very ill. And it can lead to pneumonia, a serious lung  infection. The flu can have serious complications and even cause death.  Who is at risk for the flu?  Anyone can get the flu. But you are more likely to become infected if you:    Have a weakened immune system    Work in a healthcare setting where you may be exposed to flu germs    Live or work with someone who has the flu    Haven t had an annual flu shot  How does the flu spread?  The flu is caused by a virus. The virus spreads through the air in droplets when someone who has the flu coughs, sneezes, laughs, or talks. You can become infected when you inhale these viruses directly. You can also become infected when you touch a surface on which the droplets have landed and then transfer the germs to your eyes, nose, or mouth. Touching used tissues, or sharing utensils, drinking glasses, or a toothbrush from an infected person can expose you to flu viruses, too.  What are the symptoms of the flu?  Flu symptoms tend to come on quickly and may last a few days to a few weeks. They include:    Fever usually higher than 100.4 F  (38 C) and chills    Sore throat and headache    Dry cough    Runny nose    Tiredness and weakness    Muscle aches  Who is at risk for flu complications?  For some people, the flu can be very serious. The risk for complications is greater for:    Children younger than age 5    Adults ages 65 and older    People with a chronic illness such as diabetes or heart, kidney, or lung disease    People who live in a nursing home or long-term care facility   How is the flu treated?  The flu usually gets better after 7 days or so. In some cases, your healthcare provider may prescribe an antiviral medicine. This may help you get well a little sooner. For the medicine to help, you need to take it as soon as possible (ideally within 48 hours) after your symptoms start. If you develop pneumonia or other serious illness, you may need to stay in the hospital.  Easing flu symptoms    Drink lots of fluids such as  water, juice, and warm soup. A good rule is to drink enough so that you urinate your normal amount.    Get plenty of rest.    Ask your healthcare provider what to take for fever and pain.    Call your provider if your fever is 100.4 F (38 C) or higher, or you become dizzy, lightheaded, or short of breath.  Taking steps to protect others    Wash your hands often, especially after coughing or sneezing. Or clean your hands with an alcohol-based hand  containing at least 60% alcohol.    Cough or sneeze into a tissue. Then throw the tissue away and wash your hands. If you don t have a tissue, cough and sneeze into your elbow.    Stay home until at least 24 hours after you no longer have a fever or chills. Be sure the fever isn t being hidden by fever-reducing medicine.    Don t share food, utensils, drinking glasses, or a toothbrush with others.    Ask your healthcare provider if others in your household should get antiviral medicine to help them avoid infection.  How can the flu be prevented?    One of the best ways to avoid the flu is to get a flu vaccine each year. The virus that causes the flu changes from year to year. For that reason, healthcare providers recommend getting the flu vaccine each year, as soon as it's available in your area. The vaccine is given as a shot. Your healthcare provider can tell you which vaccine is right for you. A nasal spray is also available but is not recommended for the 4148-7517 flu season. The CDC says this is because the nasal spray did not seem to protect against the flu over the last several flu seasons. In the past, it was meant for people ages 2 to 49.    Wash your hands often. Frequent handwashing is a proven way to help prevent infection.    Carry an alcohol-based hand gel containing at least 60% alcohol. Use it when you can't use soap and water. Then wash your hands as soon as you can.    Avoid touching your eyes, nose, and mouth.    At home and work, clean phones,  computer keyboards, and toys often with disinfectant wipes.    If possible, avoid close contact with others who have the flu or symptoms of the flu.  Handwashing tips  Handwashing is one of the best ways to prevent many common infections. If you are caring for or visiting someone with the flu, wash your hands each time you enter and leave the room. Follow these steps:    Use warm water and plenty of soap. Rub your hands together well.    Clean the whole hand, including under your nails, between your fingers, and up the wrists.    Wash for at least 15 seconds.    Rinse, letting the water run down your fingers, not up your wrists.    Dry your hands well. Use a paper towel to turn off the faucet and open the door.  Using alcohol-based hand   Alcohol-based hand  are also a good choice. Use them when you can't use soap and water. Follow these steps:    Squeeze about a tablespoon of gel into the palm of one hand.    Rub your hands together briskly, cleaning the backs of your hands, the palms, between your fingers, and up the wrists.    Rub until the gel is gone and your hands are completely dry.  Preventing the flu in healthcare settings  The flu is a special concern for people in hospitals and long-term care facilities. To help prevent the spread of flu, many hospitals and nursing homes take these steps:    Healthcare providers wash their hands or use an alcohol-based hand  before and after treating each patient.    People with the flu have private rooms and bathrooms or share a room with someone with the same infection.    People who are at high risk for the flu but don't have it are encouraged to get the flu and pneumonia vaccines.    All healthcare workers are encouraged or required to get flu shots.   Date Last Reviewed: 12/1/2016 2000-2017 [employer]. [address] [place] [address], [place] [address]. All rights reserved. This information is not intended as a substitute for  professional medical care. Always follow your healthcare professional's instructions.

## 2018-01-17 NOTE — LETTER
[facility]  [address] [place]  [facility] [address]  Phone: [phone]    January 17, 2018        Shirlene Rodriguez  [address]  [place] [address]          To whom it may concern:    RE: Shirlene Rodriguez    Patient was seen and treated today at our clinic and missed work. Please allow her to rest home on 1/18/2018.  Patient may return to work on 1/19/2018 with no restrictions    Please contact me for questions or concerns.      Sincerely,        Hailey Iqbal NP

## 2018-01-17 NOTE — MR AVS SNAPSHOT
After Visit Summary   1/17/2018    Shirlene Rodriguez    MRN: [id number]           Patient Information     Date Of Birth          1980        Visit Information        Provider Department      1/17/2018 11:00 AM Hailey Iqbal NP [facility]        Today's Diagnoses     Dysuria    -  1    Acute cystitis without hematuria        Flu-like symptoms          Care Instructions      Understanding Urinary Tract Infections (UTIs)  Most UTIs are caused by bacteria, although they may also be caused by viruses or fungi. Bacteria from the bowel are the most common source of infection. The infection may start because of any of the following:    Sexual activity. During sex, bacteria can travel from the penis, vagina, or rectum into the urethra.     Bacteria on the skin outside the rectum may travel into the urethra. This is more common in women since the rectum and urethra are closer to each other than in men. Wiping from front to back after using the toilet and keeping the area clean can help prevent germs from getting to the urethra.    Blockage of urine flow through the urinary tract. If urine sits too long, germs may start to grow out of control.      Parts of the urinary tract  The infection can occur in any part of the urinary tract.    The kidneys collect and store urine.    The ureters carry urine from the kidneys to the bladder.    The bladder holds urine until you are ready to let it out.    The urethra carries urine from the bladder out of the body. It is shorter in women, so bacteria can move through it more easily. The urethra is longer in men, so a UTI is less likely to reach the bladder or kidneys in men.  Date Last Reviewed: 1/1/2017 2000-2017 [employer]. [address] [place] [address], [place] [address]. All rights reserved. This information is not intended as a substitute for professional medical care. Always follow your healthcare professional's  instructions.        The Flu (Influenza)     The virus that causes the flu spreads through the air in droplets when someone who has the flu coughs, sneezes, laughs, or talks.   The flu (influenza) is an infection that affects your respiratory tract. This tract is made up of your mouth, nose, and lungs, and the passages between them. Unlike a cold, the flu can make you very ill. And it can lead to pneumonia, a serious lung infection. The flu can have serious complications and even cause death.  Who is at risk for the flu?  Anyone can get the flu. But you are more likely to become infected if you:    Have a weakened immune system    Work in a healthcare setting where you may be exposed to flu germs    Live or work with someone who has the flu    Haven t had an annual flu shot  How does the flu spread?  The flu is caused by a virus. The virus spreads through the air in droplets when someone who has the flu coughs, sneezes, laughs, or talks. You can become infected when you inhale these viruses directly. You can also become infected when you touch a surface on which the droplets have landed and then transfer the germs to your eyes, nose, or mouth. Touching used tissues, or sharing utensils, drinking glasses, or a toothbrush from an infected person can expose you to flu viruses, too.  What are the symptoms of the flu?  Flu symptoms tend to come on quickly and may last a few days to a few weeks. They include:    Fever usually higher than 100.4 F  (38 C) and chills    Sore throat and headache    Dry cough    Runny nose    Tiredness and weakness    Muscle aches  Who is at risk for flu complications?  For some people, the flu can be very serious. The risk for complications is greater for:    Children younger than age 5    Adults ages 65 and older    People with a chronic illness such as diabetes or heart, kidney, or lung disease    People who live in a nursing home or long-term care facility   How is the flu treated?  The  flu usually gets better after 7 days or so. In some cases, your healthcare provider may prescribe an antiviral medicine. This may help you get well a little sooner. For the medicine to help, you need to take it as soon as possible (ideally within 48 hours) after your symptoms start. If you develop pneumonia or other serious illness, you may need to stay in the hospital.  Easing flu symptoms    Drink lots of fluids such as water, juice, and warm soup. A good rule is to drink enough so that you urinate your normal amount.    Get plenty of rest.    Ask your healthcare provider what to take for fever and pain.    Call your provider if your fever is 100.4 F (38 C) or higher, or you become dizzy, lightheaded, or short of breath.  Taking steps to protect others    Wash your hands often, especially after coughing or sneezing. Or clean your hands with an alcohol-based hand  containing at least 60% alcohol.    Cough or sneeze into a tissue. Then throw the tissue away and wash your hands. If you don t have a tissue, cough and sneeze into your elbow.    Stay home until at least 24 hours after you no longer have a fever or chills. Be sure the fever isn t being hidden by fever-reducing medicine.    Don t share food, utensils, drinking glasses, or a toothbrush with others.    Ask your healthcare provider if others in your household should get antiviral medicine to help them avoid infection.  How can the flu be prevented?    One of the best ways to avoid the flu is to get a flu vaccine each year. The virus that causes the flu changes from year to year. For that reason, healthcare providers recommend getting the flu vaccine each year, as soon as it's available in your area. The vaccine is given as a shot. Your healthcare provider can tell you which vaccine is right for you. A nasal spray is also available but is not recommended for the 8390-2721 flu season. The CDC says this is because the nasal spray did not seem to protect  against the flu over the last several flu seasons. In the past, it was meant for people ages 2 to 49.    Wash your hands often. Frequent handwashing is a proven way to help prevent infection.    Carry an alcohol-based hand gel containing at least 60% alcohol. Use it when you can't use soap and water. Then wash your hands as soon as you can.    Avoid touching your eyes, nose, and mouth.    At home and work, clean phones, computer keyboards, and toys often with disinfectant wipes.    If possible, avoid close contact with others who have the flu or symptoms of the flu.  Handwashing tips  Handwashing is one of the best ways to prevent many common infections. If you are caring for or visiting someone with the flu, wash your hands each time you enter and leave the room. Follow these steps:    Use warm water and plenty of soap. Rub your hands together well.    Clean the whole hand, including under your nails, between your fingers, and up the wrists.    Wash for at least 15 seconds.    Rinse, letting the water run down your fingers, not up your wrists.    Dry your hands well. Use a paper towel to turn off the faucet and open the door.  Using alcohol-based hand   Alcohol-based hand  are also a good choice. Use them when you can't use soap and water. Follow these steps:    Squeeze about a tablespoon of gel into the palm of one hand.    Rub your hands together briskly, cleaning the backs of your hands, the palms, between your fingers, and up the wrists.    Rub until the gel is gone and your hands are completely dry.  Preventing the flu in healthcare settings  The flu is a special concern for people in hospitals and long-term care facilities. To help prevent the spread of flu, many hospitals and nursing homes take these steps:    Healthcare providers wash their hands or use an alcohol-based hand  before and after treating each patient.    People with the flu have private rooms and bathrooms or share a  room with someone with the same infection.    People who are at high risk for the flu but don't have it are encouraged to get the flu and pneumonia vaccines.    All healthcare workers are encouraged or required to get flu shots.   Date Last Reviewed: 12/1/2016 2000-2017 [employer]. [address] [place] [address]. All rights reserved. This information is not intended as a substitute for professional medical care. Always follow your healthcare professional's instructions.                Follow-ups after your visit        Who to contact     If you have questions or need follow up information about today's clinic visit or your schedule please contact [facility] directly at [phone].  Normal or non-critical lab and imaging results will be communicated to you by [employer], letter or phone within 4 business days after the clinic has received the results. If you do not hear from us within 7 days, please contact the clinic through [employer] or phone. If you have a critical or abnormal lab result, we will notify you by phone as soon as possible.  Submit refill requests through [employer] or call your pharmacy and they will forward the refill request to us. Please allow 3 business days for your refill to be completed.          Additional Information About Your Visit        [employer] Information     [employer] gives you secure access to your electronic health record. If you see a primary care provider, you can also send messages to your care team and make appointments. If you have questions, please call your primary care clinic.  If you do not have a primary care provider, please call [phone] and they will assist you.        Care EveryWhere ID     This is your Care EveryWhere ID. This could be used by other organizations to access your [facility] medical records  [id number]         Blood Pressure from Last 3 Encounters:   04/30/17 115/77   04/25/17 116/84   01/09/17 100/62    Weight  from Last 3 Encounters:   04/30/17 167 lb 3.2 oz (75.8 kg)   04/25/17 170 lb (77.1 kg)   01/09/17 171 lb (77.6 kg)              We Performed the Following     UA reflex to Microscopic and Culture     Urine Microscopic          Today's Medication Changes          These changes are accurate as of: 1/17/18 11:47 AM.  If you have any questions, ask your nurse or doctor.               Start taking these medicines.        Dose/Directions    oseltamivir 75 MG capsule   Commonly known as:  TAMIFLU   Used for:  Flu-like symptoms        Dose:  75 mg   Take 1 capsule (75 mg) by mouth 2 times daily for 5 days   Quantity:  10 capsule   Refills:  0       sulfamethoxazole-trimethoprim 800-160 MG per tablet   Commonly known as:  BACTRIM DS/SEPTRA DS   Used for:  Acute cystitis without hematuria        Dose:  1 tablet   Take 1 tablet by mouth 2 times daily for 3 days   Quantity:  6 tablet   Refills:  0            Where to get your medicines      These medications were sent to [facility] - [place], [place] - [address]  [address], [facility] [address]     Phone:  [phone]     oseltamivir 75 MG capsule    sulfamethoxazole-trimethoprim 800-160 MG per tablet                Primary Care Provider Office Phone # Fax #    Latisha Lara PA-C [phone]       [address] [place] [address]        Equal Access to Services     [facility] AH: Hadii keysha ku hadasho Soomaali, waaxda luqadaha, qaybta kaalmada adeegyada, angel boone. So [facility] [phone].    ATENCIÓN: Si habla español, tiene a crespo disposición servicios gratuitos de asistencia lingüística. Louis al [phone].    We comply with applicable federal civil rights laws and [place] laws. We do not discriminate on the basis of race, color, national origin, age, disability, sex, sexual orientation, or gender identity.            Thank you!     Thank you for choosing [facility]  [facility]  for your care. Our goal is always to provide you with excellent care. Hearing back from our patients is one way we can continue to improve our services. Please take a few minutes to complete the written survey that you may receive in the mail after your visit with us. Thank you!             Your Updated Medication List - Protect others around you: Learn how to safely use, store and throw away your medicines at www.disposemymeds.org.          This list is accurate as of: 1/17/18 11:47 AM.  Always use your most recent med list.                   Brand Name Dispense Instructions for use Diagnosis    amoxicillin-clavulanate 875-125 MG per tablet    AUGMENTIN    20 tablet    Take 1 tablet by mouth 2 times daily    Other acute nonsuppurative otitis media of right ear, recurrence not specified       levothyroxine 100 MCG tablet    SYNTHROID/LEVOTHROID    90 tablet    TAKE ONE TABLET BY MOUTH EVERY DAY    Acquired hypothyroidism       oseltamivir 75 MG capsule    TAMIFLU    10 capsule    Take 1 capsule (75 mg) by mouth 2 times daily for 5 days    Flu-like symptoms       sulfamethoxazole-trimethoprim 800-160 MG per tablet    BACTRIM DS/SEPTRA DS    6 tablet    Take 1 tablet by mouth 2 times daily for 3 days    Acute cystitis without hematuria

## 2018-02-05 ENCOUNTER — TELEPHONE (OUTPATIENT)
Dept: FAMILY MEDICINE | Facility: CLINIC | Age: 38
End: 2018-02-05

## 2018-02-05 NOTE — TELEPHONE ENCOUNTER
Panel Management Review      Patient has the following on her problem list:     Depression / Dysthymia review    Measure:  Needs PHQ-9 score of 4 or less during index window.  Administer PHQ-9 and if score is 5 or more, send encounter to provider for next steps.    5   7 month window range:     PHQ-9 SCORE 9/14/2015 4/28/2016 10/26/2016   Total Score - - -   Total Score 5 5 5       If PHQ-9 recheck is 5 or more, route to provider for next steps.    Patient is due for:  PHQ9    Asthma review     ACT Total Scores 11/13/2017   ACT TOTAL SCORE -   ASTHMA ER VISITS -   ASTHMA HOSPITALIZATIONS -   ACT TOTAL SCORE (Goal Greater than or Equal to 20) 23   In the past 12 months, how many times did you visit the emergency room for your asthma without being admitted to the hospital? 0   In the past 12 months, how many times were you hospitalized overnight because of your asthma? 0      1. Is Asthma diagnosis on the Problem List? Yes    2. Is Asthma listed on Health Maintenance? Yes    3. Patient is due for:  none      Composite cancer screening  Chart review shows that this patient is due/due soon for the following Pap Smear  Summary:    Patient is due/failing the following:   PAP and PHQ9    Action needed:   Patient needs office visit for physical .    Type of outreach:    Sent [employer] message.    Questions for provider review:    None                                                                                                                                    Kristin Gastelum MA        Chart routed to Care Team .

## 2018-04-24 ENCOUNTER — OFFICE VISIT (OUTPATIENT)
Dept: FAMILY MEDICINE | Facility: CLINIC | Age: 38
End: 2018-04-24
Payer: COMMERCIAL

## 2018-04-24 VITALS
WEIGHT: 175 LBS | HEIGHT: 65 IN | SYSTOLIC BLOOD PRESSURE: 118 MMHG | TEMPERATURE: 99 F | BODY MASS INDEX: 29.16 KG/M2 | RESPIRATION RATE: 18 BRPM | OXYGEN SATURATION: 98 % | HEART RATE: 82 BPM | DIASTOLIC BLOOD PRESSURE: 80 MMHG

## 2018-04-24 DIAGNOSIS — Z00.01 ENCOUNTER FOR ROUTINE ADULT MEDICAL EXAM WITH ABNORMAL FINDINGS: Primary | ICD-10-CM

## 2018-04-24 DIAGNOSIS — E03.9 ACQUIRED HYPOTHYROIDISM: ICD-10-CM

## 2018-04-24 DIAGNOSIS — R23.2 FLUSHING: ICD-10-CM

## 2018-04-24 LAB — TSH SERPL DL<=0.005 MIU/L-ACNC: 0.51 MU/L (ref 0.4–4)

## 2018-04-24 PROCEDURE — 87624 HPV HI-RISK TYP POOLED RSLT: CPT | Performed by: PHYSICIAN ASSISTANT

## 2018-04-24 PROCEDURE — 84443 ASSAY THYROID STIM HORMONE: CPT | Performed by: PHYSICIAN ASSISTANT

## 2018-04-24 PROCEDURE — G0145 SCR C/V CYTO,THINLAYER,RESCR: HCPCS | Performed by: PHYSICIAN ASSISTANT

## 2018-04-24 PROCEDURE — 99395 PREV VISIT EST AGE 18-39: CPT | Performed by: PHYSICIAN ASSISTANT

## 2018-04-24 PROCEDURE — 36415 COLL VENOUS BLD VENIPUNCTURE: CPT | Performed by: PHYSICIAN ASSISTANT

## 2018-04-24 RX ORDER — LEVOTHYROXINE SODIUM 100 UG/1
100 TABLET ORAL DAILY
Qty: 90 TABLET | Refills: 3 | Status: SHIPPED | OUTPATIENT
Start: 2018-04-24 | End: 2019-04-02

## 2018-04-24 RX ORDER — METOPROLOL SUCCINATE 25 MG/1
25 TABLET, EXTENDED RELEASE ORAL DAILY
Qty: 90 TABLET | Refills: 3 | Status: SHIPPED | OUTPATIENT
Start: 2018-04-24 | End: 2019-04-01

## 2018-04-24 ASSESSMENT — PAIN SCALES - GENERAL: PAINLEVEL: NO PAIN (0)

## 2018-04-24 NOTE — NURSING NOTE
"Chief Complaint   Patient presents with     Physical       Initial /80  Pulse 82  Temp 99  F (37.2  C) (Temporal)  Resp 18  Ht 5' 4.5\" (1.638 m)  Wt 175 lb (79.4 kg)  LMP 04/04/2018 (Exact Date)  SpO2 98%  BMI 29.57 kg/m2 Estimated body mass index is 29.57 kg/(m^2) as calculated from the following:    Height as of this encounter: 5' 4.5\" (1.638 m).    Weight as of this encounter: 175 lb (79.4 kg).  BP completed using cuff size: danna Gastelum MA      "

## 2018-04-24 NOTE — MR AVS SNAPSHOT
After Visit Summary   4/24/2018    Shirlene Rodriguez    MRN: [id number]           Patient Information     Date Of Birth          1980        Visit Information        Provider Department      4/24/2018 10:15 AM Latisha Lara PA-C [facility]        Today's Diagnoses     Encounter for routine adult medical exam with abnormal findings    -  1    Acquired hypothyroidism        Flushing          Care Instructions      Preventive Health Recommendations  Female Ages 26 - 39  Yearly exam:   See your health care provider every year in order to    Review health changes.     Discuss preventive care.      Review your medicines if you your doctor has prescribed any.    Until age 30: Get a Pap test every three years (more often if you have had an abnormal result).    After age 30: Talk to your doctor about whether you should have a Pap test every 3 years or have a Pap test with HPV screening every 5 years.   You do not need a Pap test if your uterus was removed (hysterectomy) and you have not had cancer.  You should be tested each year for STDs (sexually transmitted diseases), if you're at risk.   Talk to your provider about how often to have your cholesterol checked.  If you are at risk for diabetes, you should have a diabetes test (fasting glucose).  Shots: Get a flu shot each year. Get a tetanus shot every 10 years.   Nutrition:     Eat at least 5 servings of fruits and vegetables each day.    Eat whole-grain bread, whole-wheat pasta and brown rice instead of white grains and rice.    Talk to your provider about Calcium and Vitamin D.     Lifestyle    Exercise at least 150 minutes a week (30 minutes a day, 5 days of the week). This will help you control your weight and prevent disease.    Limit alcohol to one drink per day.    No smoking.     Wear sunscreen to prevent skin cancer.    See your dentist every six months for an exam and cleaning.            Follow-ups after your visit       "  Who to contact     If you have questions or need follow up information about today's clinic visit or your schedule please contact [facility] directly at [phone].  Normal or non-critical lab and imaging results will be communicated to you by [employer], letter or phone within 4 business days after the clinic has received the results. If you do not hear from us within 7 days, please contact the clinic through [employer] or phone. If you have a critical or abnormal lab result, we will notify you by phone as soon as possible.  Submit refill requests through [employer] or call your pharmacy and they will forward the refill request to us. Please allow 3 business days for your refill to be completed.          Additional Information About Your Visit        [employer] Information     [employer] gives you secure access to your electronic health record. If you see a primary care provider, you can also send messages to your care team and make appointments. If you have questions, please call your primary care clinic.  If you do not have a primary care provider, please call [phone] and they will assist you.        Care EveryWhere ID     This is your Care EveryWhere ID. This could be used by other organizations to access your [facility] medical records  [id number]        Your Vitals Were     Pulse Temperature Respirations Height Last Period Pulse Oximetry    82 99  F (37.2  C) (Temporal) 18 5' 4.5\" (1.638 m) 04/04/2018 (Exact Date) 98%    BMI (Body Mass Index)                   29.57 kg/m2            Blood Pressure from Last 3 Encounters:   04/24/18 118/80   04/30/17 115/77   04/25/17 116/84    Weight from Last 3 Encounters:   04/24/18 175 lb (79.4 kg)   04/30/17 167 lb 3.2 oz (75.8 kg)   04/25/17 170 lb (77.1 kg)              We Performed the Following     TSH with free T4 reflex          Today's Medication Changes          These changes are accurate as of 4/24/18 11:27 AM.  If you have any questions, ask your nurse " or doctor.               Start taking these medicines.        Dose/Directions    metoprolol succinate 25 MG 24 hr tablet   Commonly known as:  TOPROL-XL   Used for:  Flushing   Started by:  Latisha Lara PA-C        Dose:  25 mg   Take 1 tablet (25 mg) by mouth daily   Quantity:  90 tablet   Refills:  3         These medicines have changed or have updated prescriptions.        Dose/Directions    levothyroxine 100 MCG tablet   Commonly known as:  SYNTHROID/LEVOTHROID   This may have changed:  See the new instructions.   Used for:  Acquired hypothyroidism   Changed by:  Latisha Lara PA-C        Dose:  100 mcg   Take 1 tablet (100 mcg) by mouth daily   Quantity:  90 tablet   Refills:  3            Where to get your medicines      These medications were sent to [facility] - [address]  [address] [address] [place] [address]     Phone:  [phone]     levothyroxine 100 MCG tablet    metoprolol succinate 25 MG 24 hr tablet                Primary Care Provider Office Phone # Fax #    Latisha Lara PA-C [phone]       [address] [place] [address]        Equal Access to Services     [facility] AH: Hadii aad ku hadasho Soomaali, waaxda luqadaha, qaybta kaalmada adeegyada, angel puentein hayerwin rizo . So [facility] [phone].    ATENCIÓN: Si habla español, tiene a crespo disposición servicios gratuitos de asistencia lingüística. [facility] [phone].    We comply with applicable federal civil rights laws and [place] laws. We do not discriminate on the basis of race, color, national origin, age, disability, sex, sexual orientation, or gender identity.            Thank you!     Thank you for choosing [facility]  for your care. Our goal is always to provide you with excellent care. Hearing back from our patients is one way we can continue to improve our services. Please take a few minutes to complete the written survey  that you may receive in the mail after your visit with us. Thank you!             Your Updated Medication List - Protect others around you: Learn how to safely use, store and throw away your medicines at www.disposemymeds.org.          This list is accurate as of 4/24/18 11:27 AM.  Always use your most recent med list.                   Brand Name Dispense Instructions for use Diagnosis    levothyroxine 100 MCG tablet    SYNTHROID/LEVOTHROID    90 tablet    Take 1 tablet (100 mcg) by mouth daily    Acquired hypothyroidism       metoprolol succinate 25 MG 24 hr tablet    TOPROL-XL    90 tablet    Take 1 tablet (25 mg) by mouth daily    Flushing

## 2018-04-24 NOTE — PROGRESS NOTES
SUBJECTIVE:   CC: Shirlene Rodriguez is an 38 year old woman who presents for preventive health visit.  She has concerns regarding flushing especially when she is anxious or nervous.  This is been going on for the majority of her life and she feels it is worsening and even sometimes affecting her personal and work life.  Her chest will get extremely red and hot and flushes up into the neck and sometimes the face.  Wondering if there is any management for this.  Overall feels her anxiety is good day-to-day, seems to just come up randomly.  Physical   Annual:     Getting at least 3 servings of Calcium per day::  Yes    Bi-annual eye exam::  Yes    Dental care twice a year::  Yes    Sleep apnea or symptoms of sleep apnea::  None and Daytime drowsiness    Diet::  Regular (no restrictions)    Taking medications regularly::  Yes    Medication side effects::  None    Additional concerns today::  No                Asthma Follow-Up    Was ACT completed today?    Yes    ACT Total Scores 11/13/2017   ACT TOTAL SCORE -   ASTHMA ER VISITS -   ASTHMA HOSPITALIZATIONS -   ACT TOTAL SCORE (Goal Greater than or Equal to 20) 23   In the past 12 months, how many times did you visit the emergency room for your asthma without being admitted to the hospital? 0   In the past 12 months, how many times were you hospitalized overnight because of your asthma? 0       Recent asthma triggers that patient is dealing with: pt is aware of triggers       Hypothyroidism Follow-up      Since last visit, patient describes the following symptoms: Weight stable, no hair loss, no skin changes, no constipation, no loose stools      Today's PHQ-2 Score:   PHQ-2 ( 1999 [employer]) 4/24/2018   Q1: Little interest or pleasure in doing things 0   Q2: Feeling down, depressed or hopeless 0   PHQ-2 Score 0   Q1: Little interest or pleasure in doing things Not at all   Q2: Feeling down, depressed or hopeless Not at all   PHQ-2 Score 0       Abuse: Current or  Past(Physical, Sexual or Emotional)- No  Do you feel safe in your environment - Yes    Social History   Substance Use Topics     Smoking status: Never Smoker     Smokeless tobacco: Never Used     Alcohol use No      Comment: occasinal     Alcohol Use 2018   If you drink alcohol do you typically have greater than 3 drinks per day OR greater than 7 drinks per week? No   No flowsheet data found.    Reviewed orders with patient.  Reviewed health maintenance and updated orders accordingly - Yes  Patient Active Problem List   Diagnosis     GERD (gastroesophageal reflux disease)     CARDIOVASCULAR SCREENING; LDL GOAL LESS THAN 160     Abnormal Pap smear of cervix     History of      Anxiety     Acne     Acquired hypothyroidism     Past Surgical History:   Procedure Laterality Date     CARDIAC STRESS TST,COMPLETE      Normal      SECTION  2012    Procedure:  SECTION;   SECTION;  Surgeon: Warner Ac MD;  Location:  [facility]      SECTION, TUBAL LIGATION, COMBINED N/A 2016    Procedure: COMBINED  SECTION, TUBAL LIGATION;  Surgeon: Warner Ac MD;  Location:  [facility]     GYN SURGERY  2012    C section     SURGICAL HISTORY OF -       root canal       Social History   Substance Use Topics     Smoking status: Never Smoker     Smokeless tobacco: Never Used     Alcohol use No      Comment: occasinal     Family History   Problem Relation Age of Onset     Alcohol/Drug Mother      alcoholic     Prostate Cancer Father      Alzheimer Disease Paternal Grandfather            Mammogram not appropriate for this patient based on age.    Pertinent mammograms are reviewed under the imaging tab.  History of abnormal Pap smear: NO - age 30-65 PAP every 5 years with negative HPV co-testing recommended    Reviewed and updated as needed this visit by clinical staff  Allergies  Meds         Reviewed and updated as needed this visit by Provider             Review of Systems  CONSTITUTIONAL: NEGATIVE for fever, chills, change in weight  CONSTITUTIONAL:POSITIVE  for flushing as noted above  INTEGUMENTARU/SKIN: NEGATIVE for worrisome rashes, moles or lesions  EYES: NEGATIVE for vision changes or irritation  ENT: NEGATIVE for ear, mouth and throat problems  RESP: NEGATIVE for significant cough or SOB  BREAST: NEGATIVE for masses, tenderness or discharge  CV: NEGATIVE for chest pain, palpitations or peripheral edema  GI: NEGATIVE for nausea, abdominal pain, heartburn, or change in bowel habits  : NEGATIVE for unusual urinary or vaginal symptoms. Periods are regular.  MUSCULOSKELETAL: NEGATIVE for significant arthralgias or myalgia  NEURO: NEGATIVE for weakness, dizziness or paresthesias  ENDOCRINE: NEGATIVE for temperature intolerance, skin/hair changes  HEME/ALLERGY/IMMUNE: NEGATIVE for bleeding problems  PSYCHIATRIC: POSITIVE for anxiety well managed     OBJECTIVE:   There were no vitals taken for this visit.  Physical Exam  GENERAL: alert, no distress and over weight  EYES: Eyes grossly normal to inspection, PERRL and conjunctivae and sclerae normal  HENT: ear canals and TM's normal, nose and mouth without ulcers or lesions  NECK: no adenopathy, no asymmetry, masses, or scars and thyroid normal to palpation  RESP: lungs clear to auscultation - no rales, rhonchi or wheezes  BREAST: normal without masses, tenderness or nipple discharge and no palpable axillary masses or adenopathy  CV: regular rate and rhythm, normal S1 S2, no S3 or S4, no murmur, click or rub, no peripheral edema and peripheral pulses strong  ABDOMEN: soft, nontender, no hepatosplenomegaly, no masses and bowel sounds normal   (female): normal female external genitalia, normal urethral meatus, vaginal mucosa pink, moist, well rugated, and normal cervix/adnexa/uterus without masses or discharge  MS: no gross musculoskeletal defects noted, no edema  SKIN: no suspicious lesions or rashes  SKIN:  "Positive for flushing anterior upper chest into the neck area.  NEURO: Normal strength and tone, mentation intact and speech normal  PSYCH: mentation appears normal, affect normal/bright  LYMPH: no cervical, supraclavicular, axillary, or inguinal adenopathy    ASSESSMENT/PLAN:       ICD-10-CM    1. Encounter for routine adult medical exam with abnormal findings Z00.01    2. Acquired hypothyroidism E03.9 levothyroxine (SYNTHROID/LEVOTHROID) 100 MCG tablet     TSH with free T4 reflex   3. Flushing R23.2 metoprolol succinate (TOPROL-XL) 25 MG 24 hr tablet       COUNSELING:  Reviewed preventive health counseling, as reflected in patient instructions       Regular exercise       Healthy diet/nutrition       Vision screening       Osteoporosis Prevention/Bone Health         reports that she has never smoked. She has never used smokeless tobacco.    Estimated body mass index is 28.17 kg/(m^2) as calculated from the following:    Height as of 4/28/16: 5' 4.6\" (1.641 m).    Weight as of 4/30/17: 167 lb 3.2 oz (75.8 kg).   Weight management plan: Discussed healthy diet and exercise guidelines and patient will follow up in 12 months in clinic to re-evaluate.     Get TSH today and adjust levothyroxine accordingly.  Regarding the flushing, will try a low-dose beta-blocker to see if this helps with both flushing and the little bit of anxiety she has.  We will start with low-dose metoprolol extended release 25 mg at bedtime.  I reviewed side effects with her today.  If any concerns with the medication or if it is not working adequately should alert me.  Will follow up at next well exam if stable    Counseling Resources:  ATP IV Guidelines  Pooled Cohorts Equation Calculator  Breast Cancer Risk Calculator  FRAX Risk Assessment  ICSI Preventive Guidelines  Dietary Guidelines for Americans, 2010  USDA's MyPlate  ASA Prophylaxis  Lung CA Screening    Latisha Lara PA-C  [facility]    Orders Placed This Encounter "     TSH with free T4 reflex     levothyroxine (SYNTHROID/LEVOTHROID) 100 MCG tablet     metoprolol succinate (TOPROL-XL) 25 MG 24 hr tablet       AVS given to patient upon discharge today.  Electronically signed by Latisha Lara PA-C  April 24, 2018  11:23 AM

## 2018-04-26 LAB
COPATH REPORT: NORMAL
PAP: NORMAL

## 2018-04-30 LAB
FINAL DIAGNOSIS: NORMAL
HPV HR 12 DNA CVX QL NAA+PROBE: NEGATIVE
HPV16 DNA SPEC QL NAA+PROBE: NEGATIVE
HPV18 DNA SPEC QL NAA+PROBE: NEGATIVE
SPECIMEN DESCRIPTION: NORMAL
SPECIMEN SOURCE CVX/VAG CYTO: NORMAL

## 2018-10-29 ENCOUNTER — OFFICE VISIT (OUTPATIENT)
Dept: DERMATOLOGY | Facility: CLINIC | Age: 38
End: 2018-10-29
Payer: COMMERCIAL

## 2018-10-29 VITALS
HEART RATE: 83 BPM | BODY MASS INDEX: 28.32 KG/M2 | DIASTOLIC BLOOD PRESSURE: 73 MMHG | SYSTOLIC BLOOD PRESSURE: 101 MMHG | TEMPERATURE: 97.8 F | HEIGHT: 65 IN | WEIGHT: 170 LBS

## 2018-10-29 DIAGNOSIS — D18.00 ANGIOMA: ICD-10-CM

## 2018-10-29 DIAGNOSIS — D22.9 NEVUS: ICD-10-CM

## 2018-10-29 DIAGNOSIS — L81.4 LENTIGO: ICD-10-CM

## 2018-10-29 DIAGNOSIS — L70.0 ACNE VULGARIS: Primary | ICD-10-CM

## 2018-10-29 DIAGNOSIS — L82.1 SEBORRHEIC KERATOSIS: ICD-10-CM

## 2018-10-29 DIAGNOSIS — D48.5 NEOPLASM OF UNCERTAIN BEHAVIOR OF SKIN: ICD-10-CM

## 2018-10-29 PROCEDURE — 11101 HC BIOPSY SKIN/SUBQ/MUC MEM, EACH ADDTL LESION: CPT | Performed by: PHYSICIAN ASSISTANT

## 2018-10-29 PROCEDURE — 99204 OFFICE O/P NEW MOD 45 MIN: CPT | Mod: 25 | Performed by: PHYSICIAN ASSISTANT

## 2018-10-29 PROCEDURE — 11100 HC BIOPSY SKIN/SUBQ/MUC MEM, SINGLE LESION: CPT | Performed by: PHYSICIAN ASSISTANT

## 2018-10-29 PROCEDURE — 88305 TISSUE EXAM BY PATHOLOGIST: CPT | Mod: TC | Performed by: PHYSICIAN ASSISTANT

## 2018-10-29 RX ORDER — AMPICILLIN TRIHYDRATE 500 MG
CAPSULE ORAL
Qty: 180 CAPSULE | Refills: 0 | Status: SHIPPED | OUTPATIENT
Start: 2018-10-29 | End: 2019-04-01

## 2018-10-29 RX ORDER — SPIRONOLACTONE 25 MG/1
25 TABLET ORAL DAILY
Qty: 180 TABLET | Refills: 3 | Status: CANCELLED | OUTPATIENT
Start: 2018-10-29

## 2018-10-29 RX ORDER — SPIRONOLACTONE 100 MG/1
100 TABLET, FILM COATED ORAL DAILY
Qty: 90 TABLET | Refills: 1 | Status: SHIPPED | OUTPATIENT
Start: 2018-10-29 | End: 2019-02-18

## 2018-10-29 NOTE — PROGRESS NOTES
"HPI:   Shirlene Rodriguez is a 38 year old female who presents for Full skin cancer screening and to discuss acne on the face.   chief complaint  Last Skin Exam: na      1st Baseline: YES  Personal HX of Skin Cancer: no   Personal HX of Malignant Melanoma: no   Family HX of Skin Cancer / Malignant Melanoma: no  Personal HX of Atypical Moles:   no  Risk factors: regular sun exposure  New / Changing lesions: no  Social History: works at [employer]  On review of systems, there are no further skin complaints, patient is feeling otherwise well.  See patient intake sheet.  ROS of the following were done and are negative: Constitutional, Eyes, Ears, Nose,   Mouth, Throat, Cardiovascular, Respiratory, GI, Genitourinary, Musculoskeletal,   Psychiatric, Endocrine, Allergic/Immunologic.    Additional concern: acne on the face - has been present for years. Nothing has helped. She has tried numerous topicals including BPO, Tazorac, tretinoin.     This document serves as a record of the services and decisions personally performed and made by Sasha Hughes PA-C. It was created on her behalf by Jes Tai, a trained medical scribe. The creation of this document is based the provider's statements to the medical scribe.  Jes Tai October 29, 2018 12:46 PM    PHYSICAL EXAM:   /73 (BP Location: Left arm, Patient Position: Chair, Cuff Size: Adult Large)  Pulse 83  Temp 97.8  F (36.6  C) (Tympanic)  Ht 1.638 m (5' 4.5\")  Wt 77.1 kg (170 lb)  BMI 28.73 kg/m2  Skin exam performed as follows: Type 2 skin. Mood appropriate  Alert and Oriented X 3. Well developed, well nourished in no distress.  General appearance: Normal  Head including face: Normal  Eyes: conjunctiva and lids: Normal  Mouth: Lips, teeth, gums: Normal  Neck: Normal  Chest-breast/axillae: Normal  Back: Normal  Spleen and liver: Normal  Cardiovascular: Exam of peripheral vascular system by observation for swelling, varicosities, edema: Normal  Genitalia: " groin, buttocks: Normal  Extremities: digits/nails (clubbing): Normal  Eccrine and Apocrine glands: Normal  Right upper extremity: Normal  Left upper extremity: Normal  Right lower extremity: Normal  Left lower extremity: Normal  Skin: Scalp and body hair: See below    Pt deferred exam of breasts, groin, buttocks: No    Other physical findings:  1. Multiple pigmented macules on extremities and trunk  2. Multiple pigmented macules on face, trunk and extremities  3. Multiple vascular papules on trunk, arms and legs  4. Multiple scattered keratotic plaques  5. 4 mm brown papule in left axilla  6. 4 mm pink irregular macule on right trapezius.       Except as noted above, no other signs of skin cancer or melanoma.     ASSESSMENT/PLAN:   Benign Full skin cancer screening today. . Patient with history of none  Advised on monthly self exams and 1 year  Patient Education: Appropriate brochures given.    Multiple benign appearing nevi on arms, legs and trunk. Discussed ABCDEs of melanoma and sunscreen.   Multiple lentigos on arms, legs and trunk. Advised benign, no treatment needed.  Multiple scattered angiomas. Advised benign, no treatment needed.   Seborrheic keratosis on arms, legs and trunk. Advised benign, no treatment needed.  Dermal nevus r/o atypicality on the left axilla - advised. Shave bx in typical fashion .  Area cleaned with betadyne and anesthetized with 1% lidocaine with epi .  Dermablade used to remove the lesion and sent to pathology. Bleeding was cauterized. Pt tolerated procedure well.   R/o atypical nevus of right trapezius - advised. A photo was taken and placed in the chart. Shave bx in typical fashion .  Area cleaned with betadyne and anesthetized with 1% lidocaine with epi.  Dermablade used to remove the lesion and sent to pathology. Bleeding was cauterized. Pt tolerated procedure well.   Acne Vulgaris - advised on diagnosis and treatment options. Discussed use of topical medications and antibiotics.  Does flare with menses. Tends to be oily.   --Start spironolactone 100 mg daily. Advised on potential for hypotension, teratogenetic effects, menstrual irregularities, hyperkalemia and need for Q 6 month K+ checks.  --Start ampicillin BID for 3 months.  --Stop metoprolol use (she was prescribed this for flushing but it has not been efficacious)      Follow-up: 2 months/PRN sooner    1.) Patient was asked about new and changing moles. YES  2.) Patient received a complete physical skin examination: YES  3.) Patient was counseled to perform a monthly self skin examination: YES  Scribed By: Jes Tai, Medical Scribe    The information in this document, created by the medical scribe for me, accurately reflects the services I personally performed and the decisions made by me. I have reviewed and approved this document for accuracy prior to leaving the patient care area.  Sasha Hughes PA-C October 29, 2018 12:45 PM

## 2018-10-29 NOTE — MR AVS SNAPSHOT
After Visit Summary   10/29/2018    Shirlene Rodriguez    MRN: [id number]           Patient Information     Date Of Birth          1980        Visit Information        Provider Department      10/29/2018 12:15 PM Sasha Hughes PA-C [facility]        Today's Diagnoses     Acne vulgaris    -  1    Neoplasm of uncertain behavior of skin          Care Instructions          Wound Care Instructions     FOR SUPERFICIAL WOUNDS     [facility] [phone]    [facility] [phone]                       AFTER 24 HOURS YOU SHOULD REMOVE THE BANDAGE AND BEGIN DAILY DRESSING CHANGES AS FOLLOWS:     1) Remove Dressing.     2) Clean and dry the area with tap water using a Q-tip or sterile gauze pad.     3) Apply Vaseline, Aquaphor, Polysporin ointment or Bacitracin ointment over entire wound.  Do NOT use Neosporin ointment.     4) Cover the wound with a band-aid, or a sterile non-stick gauze pad and micropore paper tape      REPEAT THESE INSTRUCTIONS AT LEAST ONCE A DAY UNTIL THE WOUND HAS COMPLETELY HEALED.    It is an old wives tale that a wound heals better when it is exposed to air and allowed to dry out. The wound will heal faster with a better cosmetic result if it is kept moist with ointment and covered with a bandage.    **Do not let the wound dry out.**      Supplies Needed:      *Cotton tipped applicators (Q-tips)    *Polysporin Ointment or Bacitracin Ointment (NOT NEOSPORIN)    *Band-aids or non-stick gauze pads and micropore paper tape.      PATIENT INFORMATION:    During the healing process you will notice a number of changes. All wounds develop a small halo of redness surrounding the wound.  This means healing is occurring. Severe itching with extensive redness usually indicates sensitivity to the ointment or bandage tape used to dress the wound.  You should call our office if this develops.      Swelling  and/or discoloration around your surgical site  is common, particularly when performed around the eye.    All wounds normally drain.  The larger the wound the more drainage there will be.  After 7-10 days, you will notice the wound beginning to shrink and new skin will begin to grow.  The wound is healed when you can see skin has formed over the entire area.  A healed wound has a healthy, shiny look to the surface and is red to dark pink in color to normalize.  Wounds may take approximately 4-6 weeks to heal.  Larger wounds may take 6-8 weeks.  After the wound is healed you may discontinue dressing changes.    You may experience a sensation of tightness as your wound heals. This is normal and will gradually subside.    Your healed wound may be sensitive to temperature changes. This sensitivity improves with time, but if you re having a lot of discomfort, try to avoid temperature extremes.    Patients frequently experience itching after their wound appears to have healed because of the continue healing under the skin.  Plain Vaseline will help relieve the itching.        POSSIBLE COMPLICATIONS    BLEEDIN. Leave the bandage in place.  2. Use tightly rolled up gauze or a cloth to apply direct pressure over the bandage for 30  minutes.  3. Reapply pressure for an additional 30 minutes if necessary  4. Use additional gauze and tape to maintain pressure once the bleeding has stopped.            Follow-ups after your visit        Who to contact     If you have questions or need follow up information about today's clinic visit or your schedule please contact [facility] directly at [phone].  Normal or non-critical lab and imaging results will be communicated to you by MyChart, letter or phone within 4 business days after the clinic has received the results. If you do not hear from us within 7 days, please contact the clinic through MyChart or phone. If you have a critical or abnormal lab result, we will notify you by phone as soon as  "possible.  Submit refill requests through [employer] or call your pharmacy and they will forward the refill request to us. Please allow 3 business days for your refill to be completed.          Additional Information About Your Visit        [employer] Information     [employer] gives you secure access to your electronic health record. If you see a primary care provider, you can also send messages to your care team and make appointments. If you have questions, please call your primary care clinic.  If you do not have a primary care provider, please call [phone] and they will assist you.        Care EveryWhere ID     This is your Care EveryWhere ID. This could be used by other organizations to access your [place] medical records  [id number]        Your Vitals Were     Pulse Temperature Height BMI (Body Mass Index)          83 97.8  F (36.6  C) (Tympanic) 1.638 m (5' 4.5\") 28.73 kg/m2         Blood Pressure from Last 3 Encounters:   10/29/18 101/73   04/24/18 118/80   04/30/17 115/77    Weight from Last 3 Encounters:   10/29/18 77.1 kg (170 lb)   04/24/18 79.4 kg (175 lb)   04/30/17 75.8 kg (167 lb 3.2 oz)              Today, you had the following     No orders found for display       Primary Care Provider Office Phone # Fax #    Latisha Lara PA-C [phone] [address] [place] [address]        Equal Access to Services     [facility] AH: Hadii aad ku hadasho Soomaali, waaxda luqadaha, qaybta kaalmada adeegyada, angel boone. So [facility] [phone].    ATENCIÓN: Si habla español, tiene a crespo disposición servicios gratuitos de asistencia lingüística. Llame al [phone].    We comply with applicable federal civil rights laws and [place] laws. We do not discriminate on the basis of race, color, national origin, age, disability, sex, sexual orientation, or gender identity.            Thank you!     Thank you for choosing [facility]  for your care. " Our goal is always to provide you with excellent care. Hearing back from our patients is one way we can continue to improve our services. Please take a few minutes to complete the written survey that you may receive in the mail after your visit with us. Thank you!             Your Updated Medication List - Protect others around you: Learn how to safely use, store and throw away your medicines at www.disposemymeds.org.          This list is accurate as of 10/29/18 12:52 PM.  Always use your most recent med list.                   Brand Name Dispense Instructions for use Diagnosis    levothyroxine 100 MCG tablet    SYNTHROID/LEVOTHROID    90 tablet    Take 1 tablet (100 mcg) by mouth daily    Acquired hypothyroidism       metoprolol succinate 25 MG 24 hr tablet    TOPROL-XL    90 tablet    Take 1 tablet (25 mg) by mouth daily    Flushing

## 2018-10-29 NOTE — NURSING NOTE
"Initial /73 (BP Location: Left arm, Patient Position: Chair, Cuff Size: Adult Large)  Pulse 83  Temp 97.8  F (36.6  C) (Tympanic)  Ht 1.638 m (5' 4.5\")  Wt 77.1 kg (170 lb)  BMI 28.73 kg/m2 Estimated body mass index is 28.73 kg/(m^2) as calculated from the following:    Height as of this encounter: 1.638 m (5' 4.5\").    Weight as of this encounter: 77.1 kg (170 lb). .    Rosalio VALENZUELA CMA    "

## 2018-10-29 NOTE — LETTER
"    10/29/2018         RE: Shirlene Rodriguez  [address] [place] [address]        Dear Colleague,    Thank you for referring your patient, Shilrene Rodriguez, to the [facility]. Please see a copy of my visit note below.    HPI:   Shirlene Rodriguez is a 38 year old female who presents for Full skin cancer screening and to discuss acne on the face.   chief complaint  Last Skin Exam: na      1st Baseline: YES  Personal HX of Skin Cancer: no   Personal HX of Malignant Melanoma: no   Family HX of Skin Cancer / Malignant Melanoma: no  Personal HX of Atypical Moles:   no  Risk factors: regular sun exposure  New / Changing lesions: no  Social History: works at [employer]  On review of systems, there are no further skin complaints, patient is feeling otherwise well.  See patient intake sheet.  ROS of the following were done and are negative: Constitutional, Eyes, Ears, Nose,   Mouth, Throat, Cardiovascular, Respiratory, GI, Genitourinary, Musculoskeletal,   Psychiatric, Endocrine, Allergic/Immunologic.    Additional concern: acne on the face - has been present for years. Nothing has helped. She has tried numerous topicals including BPO, Tazorac, tretinoin.     This document serves as a record of the services and decisions personally performed and made by Sasha Hughes PA-C. It was created on her behalf by Jes Tai, a trained medical scribe. The creation of this document is based the provider's statements to the medical scribe.  Jes Tai October 29, 2018 12:46 PM    PHYSICAL EXAM:   /73 (BP Location: Left arm, Patient Position: Chair, Cuff Size: Adult Large)  Pulse 83  Temp 97.8  F (36.6  C) (Tympanic)  Ht 1.638 m (5' 4.5\")  Wt 77.1 kg (170 lb)  BMI 28.73 kg/m2  Skin exam performed as follows: Type 2 skin. Mood appropriate  Alert and Oriented X 3. Well developed, well nourished in no distress.  General appearance: Normal  Head including face: Normal  Eyes: conjunctiva and lids: " Normal  Mouth: Lips, teeth, gums: Normal  Neck: Normal  Chest-breast/axillae: Normal  Back: Normal  Spleen and liver: Normal  Cardiovascular: Exam of peripheral vascular system by observation for swelling, varicosities, edema: Normal  Genitalia: groin, buttocks: Normal  Extremities: digits/nails (clubbing): Normal  Eccrine and Apocrine glands: Normal  Right upper extremity: Normal  Left upper extremity: Normal  Right lower extremity: Normal  Left lower extremity: Normal  Skin: Scalp and body hair: See below    Pt deferred exam of breasts, groin, buttocks: No    Other physical findings:  1. Multiple pigmented macules on extremities and trunk  2. Multiple pigmented macules on face, trunk and extremities  3. Multiple vascular papules on trunk, arms and legs  4. Multiple scattered keratotic plaques  5. 4 mm brown papule in left axilla  6. 4 mm pink irregular macule on right trapezius.       Except as noted above, no other signs of skin cancer or melanoma.     ASSESSMENT/PLAN:   Benign Full skin cancer screening today. . Patient with history of none  Advised on monthly self exams and 1 year  Patient Education: Appropriate brochures given.    Multiple benign appearing nevi on arms, legs and trunk. Discussed ABCDEs of melanoma and sunscreen.   Multiple lentigos on arms, legs and trunk. Advised benign, no treatment needed.  Multiple scattered angiomas. Advised benign, no treatment needed.   Seborrheic keratosis on arms, legs and trunk. Advised benign, no treatment needed.  Dermal nevus r/o atypicality on the left axilla - advised. Shave bx in typical fashion .  Area cleaned with betadyne and anesthetized with 1% lidocaine with epi .  Dermablade used to remove the lesion and sent to pathology. Bleeding was cauterized. Pt tolerated procedure well.   R/o atypical nevus of right trapezius - advised. A photo was taken and placed in the chart. Shave bx in typical fashion .  Area cleaned with betadyne and anesthetized with 1%  lidocaine with epi.  Dermablade used to remove the lesion and sent to pathology. Bleeding was cauterized. Pt tolerated procedure well.   Acne Vulgaris - advised on diagnosis and treatment options. Discussed use of topical medications and antibiotics. Does flare with menses. Tends to be oily.   --Start spironolactone 100 mg daily. Advised on potential for hypotension, teratogenetic effects, menstrual irregularities, hyperkalemia and need for Q 6 month K+ checks.  --Start ampicillin BID for 3 months.  --Stop metoprolol use (she was prescribed this for flushing but it has not been efficacious)      Follow-up: 2 months/PRN sooner    1.) Patient was asked about new and changing moles. YES  2.) Patient received a complete physical skin examination: YES  3.) Patient was counseled to perform a monthly self skin examination: YES  Scribed By: Jes Tai Medical Scribe    The information in this document, created by the medical scribe for me, accurately reflects the services I personally performed and the decisions made by me. I have reviewed and approved this document for accuracy prior to leaving the patient care area.  Sasha Hughes PA-C October 29, 2018 12:45 PM       Again, thank you for allowing me to participate in the care of your patient.        Sincerely,        Sasha Hughes PA-C

## 2018-10-29 NOTE — PATIENT INSTRUCTIONS
Wound Care Instructions     FOR SUPERFICIAL WOUNDS     [facility] [phone]    [facility] [phone]                       AFTER 24 HOURS YOU SHOULD REMOVE THE BANDAGE AND BEGIN DAILY DRESSING CHANGES AS FOLLOWS:     1) Remove Dressing.     2) Clean and dry the area with tap water using a Q-tip or sterile gauze pad.     3) Apply Vaseline, Aquaphor, Polysporin ointment or Bacitracin ointment over entire wound.  Do NOT use Neosporin ointment.     4) Cover the wound with a band-aid, or a sterile non-stick gauze pad and micropore paper tape      REPEAT THESE INSTRUCTIONS AT LEAST ONCE A DAY UNTIL THE WOUND HAS COMPLETELY HEALED.    It is an old wives tale that a wound heals better when it is exposed to air and allowed to dry out. The wound will heal faster with a better cosmetic result if it is kept moist with ointment and covered with a bandage.    **Do not let the wound dry out.**      Supplies Needed:      *Cotton tipped applicators (Q-tips)    *Polysporin Ointment or Bacitracin Ointment (NOT NEOSPORIN)    *Band-aids or non-stick gauze pads and micropore paper tape.      PATIENT INFORMATION:    During the healing process you will notice a number of changes. All wounds develop a small halo of redness surrounding the wound.  This means healing is occurring. Severe itching with extensive redness usually indicates sensitivity to the ointment or bandage tape used to dress the wound.  You should call our office if this develops.      Swelling  and/or discoloration around your surgical site is common, particularly when performed around the eye.    All wounds normally drain.  The larger the wound the more drainage there will be.  After 7-10 days, you will notice the wound beginning to shrink and new skin will begin to grow.  The wound is healed when you can see skin has formed over the entire area.  A healed wound has a healthy, shiny look to the surface and is red to dark pink in color  to normalize.  Wounds may take approximately 4-6 weeks to heal.  Larger wounds may take 6-8 weeks.  After the wound is healed you may discontinue dressing changes.    You may experience a sensation of tightness as your wound heals. This is normal and will gradually subside.    Your healed wound may be sensitive to temperature changes. This sensitivity improves with time, but if you re having a lot of discomfort, try to avoid temperature extremes.    Patients frequently experience itching after their wound appears to have healed because of the continue healing under the skin.  Plain Vaseline will help relieve the itching.        POSSIBLE COMPLICATIONS    BLEEDIN. Leave the bandage in place.  2. Use tightly rolled up gauze or a cloth to apply direct pressure over the bandage for 30  minutes.  3. Reapply pressure for an additional 30 minutes if necessary  4. Use additional gauze and tape to maintain pressure once the bleeding has stopped.

## 2018-11-01 LAB — COPATH REPORT: NORMAL

## 2018-11-26 ENCOUNTER — OFFICE VISIT (OUTPATIENT)
Dept: DERMATOLOGY | Facility: CLINIC | Age: 38
End: 2018-11-26
Payer: COMMERCIAL

## 2018-11-26 VITALS — HEART RATE: 99 BPM | OXYGEN SATURATION: 98 % | SYSTOLIC BLOOD PRESSURE: 111 MMHG | DIASTOLIC BLOOD PRESSURE: 68 MMHG

## 2018-11-26 DIAGNOSIS — D48.5 NEOPLASM OF UNCERTAIN BEHAVIOR OF SKIN: Primary | ICD-10-CM

## 2018-11-26 PROCEDURE — 11602 EXC TR-EXT MAL+MARG 1.1-2 CM: CPT | Performed by: DERMATOLOGY

## 2018-11-26 PROCEDURE — 88331 PATH CONSLTJ SURG 1 BLK 1SPC: CPT | Performed by: DERMATOLOGY

## 2018-11-26 NOTE — LETTER
2018         RE: Shirlene Rodriguez  [address] [place] [address]        Dear Colleague,    Thank you for referring your patient, Shirlene Rodriguez, to the [facility]. Please see a copy of my visit note below.    Shirlene Rodriguez is a 38 year old year old female patient here today for evaluation and managment of atypical nevus on right post shoulder.   .  Patient states this has been present for ?.  Patient reports the following symptoms:  none .  Patient reports the following previous treatments none.  Patient reports the following modifying factors none.  Associated symptoms: none.  Patient has no other skin complaints today.  Remainder of the HPI, Meds, PMH, Allergies, FH, and SH was reviewed in chart.      Past Medical History:   Diagnosis Date     Abnormal Pap smear of cervix 2011     Acne      Allergies      Anxiety state, unspecified 2005     Major depression in complete remission (H) 2012     Papanicolaou smear of cervix with low grade squamous intraepithelial lesion (LGSIL)     HPV 16 - high risk - colp negative     Unspecified asthma(493.90)      Unspecified hypothyroidism 2005       Past Surgical History:   Procedure Laterality Date     CARDIAC STRESS TST,COMPLETE      Normal      SECTION  2012    Procedure:  SECTION;   SECTION;  Surgeon: Warner Ac MD;  Location:  L+D      SECTION, TUBAL LIGATION, COMBINED N/A 2016    Procedure: COMBINED  SECTION, TUBAL LIGATION;  Surgeon: Warner Ac MD;  Location: [facility] L[facility]     GYN SURGERY  2012    C section     SURGICAL HISTORY OF -       root canal        Family History   Problem Relation Age of Onset     Alcohol/Drug Mother      alcoholic     Prostate Cancer Father      Alzheimer Disease Paternal Grandfather        Social History     Social History     Marital status:      Spouse name: N/A     Number of children: N/A      Years of education: 14     Occupational History     Pharmacy [employer]     Social History Main Topics     Smoking status: Never Smoker     Smokeless tobacco: Never Used     Alcohol use No      Comment: occasinal     Drug use: No     Sexual activity: Yes     Partners: Male     Birth control/ protection: Pill     Other Topics Concern      Service No     Blood Transfusions No     Caffeine Concern Yes     Advised not more than 200 mg/day     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  No smokers in the home.  Has one indoor cat.  Advised about toxoplasmosis precautions.   No concerns about domestic violence.         Outpatient Encounter Prescriptions as of 11/26/2018   Medication Sig Dispense Refill     ampicillin (PRINCIPEN) 500 MG capsule Take one tab BID for 3 months 180 capsule 0     levothyroxine (SYNTHROID/LEVOTHROID) 100 MCG tablet Take 1 tablet (100 mcg) by mouth daily 90 tablet 3     spironolactone (ALDACTONE) 100 MG tablet Take 1 tablet (100 mg) by mouth daily 90 tablet 1     metoprolol succinate (TOPROL-XL) 25 MG 24 hr tablet Take 1 tablet (25 mg) by mouth daily (Patient not taking: Reported on 11/26/2018) 90 tablet 3     No facility-administered encounter medications on file as of 11/26/2018.              Review Of Systems  Skin: As above  Eyes: negative  Ears/Nose/Throat: negative  Respiratory: No shortness of breath, dyspnea on exertion, cough, or hemoptysis  Cardiovascular: negative  Gastrointestinal: negative  Genitourinary: negative  Musculoskeletal: negative  Neurologic: negative  Psychiatric: negative  Hematologic/Lymphatic/Immunologic: negative  Endocrine:  negative      O:   NAD, WDWN, Alert & Oriented, Mood & Affect wnl, Vitals stable   Here today alone   /68  Pulse 99  SpO2 98%   General appearance normal   Vitals stable   Alert, oriented and in no acute distress      R post shoulder 9mm red patch       Eyes: Conjunctivae/lids:Normal     ENT: Lips, buccal mucosa, tongue: normal    MSK:Normal    Cardiovascular: peripheral edema none    Pulm: Breathing Normal    Lymph Nodes: No Head and Neck Lymphadenopathy     Neuro/Psych: Orientation:Normal; Mood/Affect:Normal      MICRO:   R post shoulder:Unremarkable epidermis with parallel bundles of cellular collagen within the superficial dermis.  No concerning areas for malignancy.     A/P:  1. Atypical nevus  Pathophysiology discussed with pateint   EXCISION OF AN, Margins confirmed with FROZEN SECTIONS and MART1 stain AND Second intent: After thorough discussion of PGACAC, consent obtained, anesthesia and prep, the margins of the lesion were identified and an elliptical incision was made encompassing the lesion with 4mm margin. The incisions were made through the skin and down to and including the superficial dermis.  The lesion was removed en bloc and submitted for frozen section pathologic review. Clear margins obtained (1.4cm).  MART1 stain performed on one section.   REPAIR SECOND INTENT: We discussed the options for wound management in full with the patient including risks/benefits/possible outcomes. Decision made to allow the wound to heal by second intention. EBL minimal; complications none; wound care routine.  The patient was discharged in good condition and will return in one month or prn for wound evaluation.       BENIGN LESIONS DISCUSSED WITH PATIENT:  I discussed the specifics of tumor, prognosis, and genetics of benign lesions.  I explained that treatment of these lesions would be purely cosmetic and not medically neccessary.  I discussed with patient different removal options including excision,  cautery and /or laser.      Nature and genetics of benign skin lesions dicussed with patient.  Signs and Symptoms of skin cancer discussed with patient.  ABCDEs of melanoma reviewed with patient.  Patient encouraged to perform monthly skin exams.  UV precautions reviewed with patient.  Patient to follow up with Primary Care provider regarding elevated blood pressure.  Skin care regimen reviewed with patient: Eliminate harsh soaps, i.e. Dial, zest, irsih spring; Mild soaps such as Cetaphil or Dove sensitive skin, avoid hot or cold showers, aggressive use of emollients including vanicream, cetaphil or cerave discussed with patient.    Risks of non-melanoma skin cancer discussed with patient   Return to clinic 6 months        Again, thank you for allowing me to participate in the care of your patient.        Sincerely,        Mayito Alvarado MD

## 2018-11-26 NOTE — MR AVS SNAPSHOT
After Visit Summary   2018    Shirlene Rodriguez    MRN: [id number]           Patient Information     Date Of Birth          1980        Visit Information        Provider Department      2018 7:45 AM Mayito Alvarado MD [facility]        Care Instructions    Open Wound Care     For Right Upper back        ? No strenuous activity for 48 hours    ? Take Tylenol as needed for discomfort.                                                .         ? Do not drink alcoholic beverages for 48 hours.    ? Keep the pressure bandage in place for 24 hours. If the bandage becomes blood tinged or loose, reinforce it with gauze and tape.        (Refer to the reverse side of this page for management of bleeding).    ? Remove bandage in 24 hours and begin wound care as follows:     1. Clean area with tap water using a Q tip or gauze pad, (shower / bathe normally)  2. Dry wound with Q tip or gauze pad  3. Apply Aquaphor, Vaseline, Polysporin or Bacitracin Ointment with a Q tip    Do NOT use Neosporin Ointment *  4. Cover the wound with a band-aid or nonstick gauze pad and paper tape.  5. Repeat wound care once a day until wound is completely healed.    It is an old wives tale that a wound heals better when it is exposed to air and allowed to dry out. The wound will heal faster with a better cosmetic result if it is kept moist with ointment and covered with a bandage.  Do not let the wound dry out.      Supplies Needed:                Qtips or gauze pads                Polysporin or Bacitracin Ointment                Bandaids or nonstick gauze pads and paper tape    Wound care kits and brown paper tape are available for purchase at   the pharmacy.    BLEEDIN. Use tightly rolled up gauze or cloth to apply direct pressure over the bandage for 20   minutes.  2. Reapply pressure for an additional 20 minutes if necessary  3. Call the office or go to the nearest emergency room if  pressure fails to stop the bleeding.  4. Use additional gauze and tape to maintain pressure once the bleeding has stopped.  5. Begin wound care 24 hours after surgery as directed.                  WOUND HEALING    1. One week after surgery a pink / red halo will form around the outside of the wound.   This is new skin.  2. The center of the wound will appear yellowish white and produce some drainage.  3. The pink halo will slowly migrate in toward the center of the wound until the wound is covered with new shiny pink skin.  4. There will be no more drainage when the wound is completely healed.  5. It will take six months to one year for the redness to fade.  6. The scar may be itchy, tight and sensitive to extreme temperatures for a year after the surgery.  7. Massaging the area several times a day for several minutes after the wound is completely healed will help the scar soften and normalize faster. Begin massage only after healing is complete.      In case of emergency call: Dr Alvarado: [phone]     [facility]: [phone]    [facility]: [phone]            Follow-ups after your visit        Your next 10 appointments already scheduled     Dec 17, 2018  9:00 AM CST   Return Visit with Sasha Hughes PA-C   [facility]    [address] [address] [phone]              Who to contact     If you have questions or need follow up information about today's clinic visit or your schedule please contact [facility] directly at [phone].  Normal or non-critical lab and imaging results will be communicated to you by MyChart, letter or phone within 4 business days after the clinic has received the results. If you do not hear from us within 7 days, please contact the clinic through MyChart or phone. If you have a critical or abnormal lab result, we will notify you by phone as soon as possible.  Submit refill  requests through [employer] or call your pharmacy and they will forward the refill request to us. Please allow 3 business days for your refill to be completed.          Additional Information About Your Visit        [employer] Information     [employer] gives you secure access to your electronic health record. If you see a primary care provider, you can also send messages to your care team and make appointments. If you have questions, please call your primary care clinic.  If you do not have a primary care provider, please call [phone] and they will assist you.        Care EveryWhere ID     This is your Care EveryWhere ID. This could be used by other organizations to access your [place] medical records  [id number]        Your Vitals Were     Pulse Pulse Oximetry                99 98%           Blood Pressure from Last 3 Encounters:   11/26/18 111/68   10/29/18 101/73   04/24/18 118/80    Weight from Last 3 Encounters:   10/29/18 77.1 kg (170 lb)   04/24/18 79.4 kg (175 lb)   04/30/17 75.8 kg (167 lb 3.2 oz)              Today, you had the following     No orders found for display       Primary Care Provider Office Phone # Fax #    Latisha Lara PA-C [phone] [address] [place] [address]        Equal Access to Services     FERMIN BRAMBILA : Hadii aad ku hadasho Soomaali, waaxda luqadaha, qaybta kaalmada adeegyada, waxay idiin haytjn cesar dial lalaurel boone. So [facility] [phone].    ATENCIÓN: Si habla español, tiene a crespo disposición servicios gratuitos de asistencia lingüística. Llame al [phone].    We comply with applicable federal civil rights laws and [place] laws. We do not discriminate on the basis of race, color, national origin, age, disability, sex, sexual orientation, or gender identity.            Thank you!     Thank you for choosing [facility]  for your care. Our goal is always to provide you with excellent care. Hearing back from our patients is one way  we can continue to improve our services. Please take a few minutes to complete the written survey that you may receive in the mail after your visit with us. Thank you!             Your Updated Medication List - Protect others around you: Learn how to safely use, store and throw away your medicines at www.disposemymeds.org.          This list is accurate as of 11/26/18  8:24 AM.  Always use your most recent med list.                   Brand Name Dispense Instructions for use Diagnosis    ampicillin 500 MG capsule    PRINCIPEN    180 capsule    Take one tab BID for 3 months    Acne vulgaris       levothyroxine 100 MCG tablet    SYNTHROID/LEVOTHROID    90 tablet    Take 1 tablet (100 mcg) by mouth daily    Acquired hypothyroidism       metoprolol succinate 25 MG 24 hr tablet    TOPROL-XL    90 tablet    Take 1 tablet (25 mg) by mouth daily    Flushing       spironolactone 100 MG tablet    ALDACTONE    90 tablet    Take 1 tablet (100 mg) by mouth daily    Acne vulgaris

## 2018-11-26 NOTE — PROGRESS NOTES
Shirlene Rodriguez is a 38 year old year old female patient here today for evaluation and managment of atypical nevus on right post shoulder.   .  Patient states this has been present for ?.  Patient reports the following symptoms:  none .  Patient reports the following previous treatments none.  Patient reports the following modifying factors none.  Associated symptoms: none.  Patient has no other skin complaints today.  Remainder of the HPI, Meds, PMH, Allergies, FH, and SH was reviewed in chart.      Past Medical History:   Diagnosis Date     Abnormal Pap smear of cervix 2011     Acne      Allergies      Anxiety state, unspecified 2005     Major depression in complete remission (H) 2012     Papanicolaou smear of cervix with low grade squamous intraepithelial lesion (LGSIL)     HPV 16 - high risk - colp negative     Unspecified asthma(493.90)      Unspecified hypothyroidism 2005       Past Surgical History:   Procedure Laterality Date     CARDIAC STRESS TST,COMPLETE      Normal      SECTION  2012    Procedure:  SECTION;   SECTION;  Surgeon: Warner Ac MD;  Location:  [facility]      SECTION, TUBAL LIGATION, COMBINED N/A 2016    Procedure: COMBINED  SECTION, TUBAL LIGATION;  Surgeon: Warner Ac MD;  Location:  [facility]     GYN SURGERY  2012    C section     SURGICAL HISTORY OF -       root canal        Family History   Problem Relation Age of Onset     Alcohol/Drug Mother      alcoholic     Prostate Cancer Father      Alzheimer Disease Paternal Grandfather        Social History     Social History     Marital status:      Spouse name: N/A     Number of children: N/A     Years of education: 14     Occupational History     Pharmacy [employer]     Social History Main Topics     Smoking status: Never Smoker     Smokeless tobacco: Never Used     Alcohol use No      Comment: occasinal     Drug  use: No     Sexual activity: Yes     Partners: Male     Birth control/ protection: Pill     Other Topics Concern      Service No     Blood Transfusions No     Caffeine Concern Yes     Advised not more than 200 mg/day     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  No smokers in the home.  Has one indoor cat.  Advised about toxoplasmosis precautions.   No concerns about domestic violence.         Outpatient Encounter Prescriptions as of 11/26/2018   Medication Sig Dispense Refill     ampicillin (PRINCIPEN) 500 MG capsule Take one tab BID for 3 months 180 capsule 0     levothyroxine (SYNTHROID/LEVOTHROID) 100 MCG tablet Take 1 tablet (100 mcg) by mouth daily 90 tablet 3     spironolactone (ALDACTONE) 100 MG tablet Take 1 tablet (100 mg) by mouth daily 90 tablet 1     metoprolol succinate (TOPROL-XL) 25 MG 24 hr tablet Take 1 tablet (25 mg) by mouth daily (Patient not taking: Reported on 11/26/2018) 90 tablet 3     No facility-administered encounter medications on file as of 11/26/2018.              Review Of Systems  Skin: As above  Eyes: negative  Ears/Nose/Throat: negative  Respiratory: No shortness of breath, dyspnea on exertion, cough, or hemoptysis  Cardiovascular: negative  Gastrointestinal: negative  Genitourinary: negative  Musculoskeletal: negative  Neurologic: negative  Psychiatric: negative  Hematologic/Lymphatic/Immunologic: negative  Endocrine: negative      O:   NAD, WDWN, Alert & Oriented, Mood & Affect wnl, Vitals stable   Here today alone   /68  Pulse 99  SpO2 98%   General appearance normal   Vitals stable   Alert, oriented and in no acute distress      R post shoulder 9mm red patch        Eyes: Conjunctivae/lids:Normal     ENT: Lips, buccal mucosa, tongue: normal    MSK:Normal    Cardiovascular: peripheral edema none    Pulm: Breathing Normal    Lymph Nodes: No Head and Neck Lymphadenopathy     Neuro/Psych: Orientation:Normal; Mood/Affect:Normal      MICRO:   R post shoulder:Unremarkable epidermis with parallel bundles of cellular collagen within the superficial dermis.  No concerning areas for malignancy.     A/P:  1. Atypical nevus  Pathophysiology discussed with pateint   EXCISION OF AN, Margins confirmed with FROZEN SECTIONS and MART1 stain AND Second intent: After thorough discussion of PGACAC, consent obtained, anesthesia and prep, the margins of the lesion were identified and an elliptical incision was made encompassing the lesion with 4mm margin. The incisions were made through the skin and down to and including the superficial dermis.  The lesion was removed en bloc and submitted for frozen section pathologic review. Clear margins obtained (1.4cm).  MART1 stain performed on one section.   REPAIR SECOND INTENT: We discussed the options for wound management in full with the patient including risks/benefits/possible outcomes. Decision made to allow the wound to heal by second intention. EBL minimal; complications none; wound care routine.  The patient was discharged in good condition and will return in one month or prn for wound evaluation.       BENIGN LESIONS DISCUSSED WITH PATIENT:  I discussed the specifics of tumor, prognosis, and genetics of benign lesions.  I explained that treatment of these lesions would be purely cosmetic and not medically neccessary.  I discussed with patient different removal options including excision, cautery and /or laser.      Nature and genetics of benign skin lesions dicussed with patient.  Signs and Symptoms of skin cancer discussed with patient.  ABCDEs of melanoma reviewed with patient.  Patient encouraged to perform monthly skin exams.  UV  precautions reviewed with patient.  Patient to follow up with Primary Care provider regarding elevated blood pressure.  Skin care regimen reviewed with patient: Eliminate harsh soaps, i.e. Dial, zest, irsih spring; Mild soaps such as Cetaphil or Dove sensitive skin, avoid hot or cold showers, aggressive use of emollients including vanicream, cetaphil or cerave discussed with patient.    Risks of non-melanoma skin cancer discussed with patient   Return to clinic 6 months

## 2018-11-26 NOTE — PATIENT INSTRUCTIONS
Open Wound Care     For Right Upper back        ? No strenuous activity for 48 hours    ? Take Tylenol as needed for discomfort.                                                .         ? Do not drink alcoholic beverages for 48 hours.    ? Keep the pressure bandage in place for 24 hours. If the bandage becomes blood tinged or loose, reinforce it with gauze and tape.        (Refer to the reverse side of this page for management of bleeding).    ? Remove bandage in 24 hours and begin wound care as follows:     1. Clean area with tap water using a Q tip or gauze pad, (shower / bathe normally)  2. Dry wound with Q tip or gauze pad  3. Apply Aquaphor, Vaseline, Polysporin or Bacitracin Ointment with a Q tip    Do NOT use Neosporin Ointment *  4. Cover the wound with a band-aid or nonstick gauze pad and paper tape.  5. Repeat wound care once a day until wound is completely healed.    It is an old wives tale that a wound heals better when it is exposed to air and allowed to dry out. The wound will heal faster with a better cosmetic result if it is kept moist with ointment and covered with a bandage.  Do not let the wound dry out.      Supplies Needed:                Qtips or gauze pads                Polysporin or Bacitracin Ointment                Bandaids or nonstick gauze pads and paper tape    Wound care kits and brown paper tape are available for purchase at   the pharmacy.    BLEEDIN. Use tightly rolled up gauze or cloth to apply direct pressure over the bandage for 20   minutes.  2. Reapply pressure for an additional 20 minutes if necessary  3. Call the office or go to the nearest emergency room if pressure fails to stop the bleeding.  4. Use additional gauze and tape to maintain pressure once the bleeding has stopped.  5. Begin wound care 24 hours after surgery as directed.                  WOUND HEALING    1. One week after surgery a pink / red halo will form around the outside of the wound.   This is new  skin.  2. The center of the wound will appear yellowish white and produce some drainage.  3. The pink halo will slowly migrate in toward the center of the wound until the wound is covered with new shiny pink skin.  4. There will be no more drainage when the wound is completely healed.  5. It will take six months to one year for the redness to fade.  6. The scar may be itchy, tight and sensitive to extreme temperatures for a year after the surgery.  7. Massaging the area several times a day for several minutes after the wound is completely healed will help the scar soften and normalize faster. Begin massage only after healing is complete.      In case of emergency call: Dr Alvarado: [phone]     [facility]: [phone]    [facility]: [phone]

## 2018-11-26 NOTE — NURSING NOTE
Surgical Office Location :   [facility]  [address] [place] [address]

## 2018-12-17 ENCOUNTER — OFFICE VISIT (OUTPATIENT)
Dept: DERMATOLOGY | Facility: CLINIC | Age: 38
End: 2018-12-17
Payer: COMMERCIAL

## 2018-12-17 VITALS
SYSTOLIC BLOOD PRESSURE: 118 MMHG | TEMPERATURE: 98.1 F | HEIGHT: 66 IN | OXYGEN SATURATION: 99 % | HEART RATE: 92 BPM | BODY MASS INDEX: 27.44 KG/M2 | DIASTOLIC BLOOD PRESSURE: 80 MMHG | RESPIRATION RATE: 20 BRPM

## 2018-12-17 DIAGNOSIS — L70.0 ACNE VULGARIS: Primary | ICD-10-CM

## 2018-12-17 PROCEDURE — 99213 OFFICE O/P EST LOW 20 MIN: CPT | Performed by: PHYSICIAN ASSISTANT

## 2018-12-17 RX ORDER — DOXYCYCLINE 100 MG/1
CAPSULE ORAL
Qty: 60 CAPSULE | Refills: 1 | Status: SHIPPED | OUTPATIENT
Start: 2018-12-17 | End: 2019-02-21

## 2018-12-17 RX ORDER — TRETINOIN 0.25 MG/G
CREAM TOPICAL
Qty: 45 G | Refills: 11 | Status: SHIPPED | OUTPATIENT
Start: 2018-12-17 | End: 2019-09-09

## 2018-12-17 NOTE — PROGRESS NOTES
"HPI:   Shirlene Rodriguez is a 38 year old female who presents for recheck of acne.  Patient feels she is improving although has not cleared.  On spironolactone and ampicillin currently. No adverse effects. Patient is using Rhodanine fields toner, moisturizer, and wash.  chief complaint  Condition has been present for: a while  Pt complains of pain: Yes     Previous treatments include: none  Areas Involved: face  Current Outpatient Medications   Medication Sig Dispense Refill     ampicillin (PRINCIPEN) 500 MG capsule Take one tab BID for 3 months 180 capsule 0     levothyroxine (SYNTHROID/LEVOTHROID) 100 MCG tablet Take 1 tablet (100 mcg) by mouth daily 90 tablet 3     spironolactone (ALDACTONE) 100 MG tablet Take 1 tablet (100 mg) by mouth daily 90 tablet 1     metoprolol succinate (TOPROL-XL) 25 MG 24 hr tablet Take 1 tablet (25 mg) by mouth daily (Patient not taking: Reported on 11/26/2018) 90 tablet 3     Allergies   Allergen Reactions     No Known Allergies      Denies any other skin complaints, in general feels well: Yes  Review of symptoms otherwise negative:Yes    This document serves as a record of the services and decisions personally performed and made by Sasha Hughes PA-C. It was created on her behalf by Jes Tai, a trained medical scribe. The creation of this document is based the provider's statements to the medical scribe.  Jes Tai December 17, 2018 9:03 AM    PHYSICAL EXAM:   A&Ox3: Yes   Well developed/well nourished female Yes   Mood appropriate Yes      /80 (BP Location: Right arm, Patient Position: Sitting, Cuff Size: Adult Regular)   Pulse 92   Temp 98.1  F (36.7  C) (Oral)   Resp 20   Ht 1.676 m (5' 6\")   SpO2 99%   BMI 27.44 kg/m    Type 2 skin. Mood appropriate  Alert and Oriented X 3. Well developed, well nourished in no distress.  General appearance: Normal  Head including face: Normal  Eyes: conjunctiva and lids: Normal  Mouth: Lips, teeth, gums: Normal  Neck: " Normal  Back: clear  Cardiovascular: Exam of peripheral vascular system by observation for swelling, varicosities, edema: Normal  Extremities: digits/nails (clubbing): Normal  Right upper extremity: Normal  Left upper extremity: Normal  Right lower extremity: Normal  Left lower extremity: Normal  Skin: Scalp and body hair: See below     Comedones Papules/Pustules Cysts Staining Scarring   Face/Neck 2+ 2+ cheeks, jawline 0 0 0   Chest 0 0 0 0 0   Back 0 0 0 0 0     Telangiectasias: No Fixed Erythema: No Exoriations: No   Other Physical Exam Findings:    ASSESSMENT & PLAN:   1. Acne Vulgaris- patient feels she is improving but not cleared at this time. - advised on diagnosis and treatment options. Discussed use of topical medications and antibiotics. Does flare with menses. Tends to be oily.   --Continue spironolactone 100 mg daily. Advised on potential for hypotension, teratogenetic effects, menstrual irregularities, hyperkalemia and need for Q 6 month K+ checks.  --Stop ampicillin  --Start doxycycline 100 mg BID x 3 months. Advised to take with food. Discussed risk of GI upset, esophagitis and photosensitivity.    --Start tretinoin 0.025% to entire face every day.        Pt advised on use and risks including photosensitivity, allergic reactions, GI upset, headaches, nausea, erythema, scaling, vertigo, asthralgias, blood clots:Yes    Follow-up: 2 months  CC:   Scribed By: Jes Tai Medical Scribe    The information in this document, created by the medical scribe for me, accurately reflects the services I personally performed and the decisions made by me. I have reviewed and approved this document for accuracy prior to leaving the patient care area.  Sasha Hughes PA-C December 17, 2018 9:06 AM

## 2018-12-17 NOTE — LETTER
"    12/17/2018         RE: Shirlene Rodriguez  [address] [place] [address]        Dear Colleague,    Thank you for referring your patient, Shirlene Rodriguez, to the [facility]. Please see a copy of my visit note below.    HPI:   Shirlene Rodriguez is a 38 year old female who presents for recheck of acne.  Patient feels she is improving although has not cleared.  On spironolactone and ampicillin currently. No adverse effects. Patient is using Rhodanine fields toner, moisturizer, and wash.  chief complaint  Condition has been present for: a while  Pt complains of pain: Yes     Previous treatments include: none  Areas Involved: face  Current Outpatient Medications   Medication Sig Dispense Refill     ampicillin (PRINCIPEN) 500 MG capsule Take one tab BID for 3 months 180 capsule 0     levothyroxine (SYNTHROID/LEVOTHROID) 100 MCG tablet Take 1 tablet (100 mcg) by mouth daily 90 tablet 3     spironolactone (ALDACTONE) 100 MG tablet Take 1 tablet (100 mg) by mouth daily 90 tablet 1     metoprolol succinate (TOPROL-XL) 25 MG 24 hr tablet Take 1 tablet (25 mg) by mouth daily (Patient not taking: Reported on 11/26/2018) 90 tablet 3     Allergies   Allergen Reactions     No Known Allergies      Denies any other skin complaints, in general feels well: Yes  Review of symptoms otherwise negative:Yes    This document serves as a record of the services and decisions personally performed and made by Sasha Hughes PA-C. It was created on her behalf by Jes Tai, a trained medical scribe. The creation of this document is based the provider's statements to the medical scribe.  Jes Tai December 17, 2018 9:03 AM    PHYSICAL EXAM:   A&Ox3: Yes   Well developed/well nourished female Yes   Mood appropriate Yes      /80 (BP Location: Right arm, Patient Position: Sitting, Cuff Size: Adult Regular)   Pulse 92   Temp 98.1  F (36.7  C) (Oral)   Resp 20   Ht 1.676 m (5' 6\")   SpO2 99%   BMI 27.44 " kg/m     Type 2 skin. Mood appropriate  Alert and Oriented X 3. Well developed, well nourished in no distress.  General appearance: Normal  Head including face: Normal  Eyes: conjunctiva and lids: Normal  Mouth: Lips, teeth, gums: Normal  Neck: Normal  Back: clear  Cardiovascular: Exam of peripheral vascular system by observation for swelling, varicosities, edema: Normal  Extremities: digits/nails (clubbing): Normal  Right upper extremity: Normal  Left upper extremity: Normal  Right lower extremity: Normal  Left lower extremity: Normal  Skin: Scalp and body hair: See below     Comedones Papules/Pustules Cysts Staining Scarring   Face/Neck 2+ 2+ cheeks, jawline 0 0 0   Chest 0 0 0 0 0   Back 0 0 0 0 0     Telangiectasias: No Fixed Erythema: No Exoriations: No   Other Physical Exam Findings:    ASSESSMENT & PLAN:   1. Acne Vulgaris- patient feels she is improving but not cleared at this time. - advised on diagnosis and treatment options. Discussed use of topical medications and antibiotics. Does flare with menses. Tends to be oily.   --Continue spironolactone 100 mg daily. Advised on potential for hypotension, teratogenetic effects, menstrual irregularities, hyperkalemia and need for Q 6 month K+ checks.  --Stop ampicillin  --Start doxycycline 100 mg BID x 3 months. Advised to take with food. Discussed risk of GI upset, esophagitis and photosensitivity.    --Start tretinoin 0.025% to entire face every day.        Pt advised on use and risks including photosensitivity, allergic reactions, GI upset, headaches, nausea, erythema, scaling, vertigo, asthralgias, blood clots:Yes    Follow-up: 2 months  CC:   Scribed By: Jes Tai Medical Scribe    The information in this document, created by the medical scribe for me, accurately reflects the services I personally performed and the decisions made by me. I have reviewed and approved this document for accuracy prior to leaving the patient care area.  Sasha Hughes PA-C  "December 17, 2018 9:06 AM      Chief Complaint   Patient presents with     Acne       Vitals:    12/17/18 0856   BP: 118/80   BP Location: Right arm   Patient Position: Sitting   Cuff Size: Adult Regular   Pulse: 92   Resp: 20   Temp: 98.1  F (36.7  C)   TempSrc: Oral   SpO2: 99%   Height: 1.676 m (5' 6\")     Wt Readings from Last 1 Encounters:   10/29/18 77.1 kg (170 lb)       Jovanna WHYTE RN   Specialty Clin[facility]           Again, thank you for allowing me to participate in the care of your patient.        Sincerely,        Sasha Patel PA-C    "

## 2018-12-17 NOTE — PROGRESS NOTES
"Chief Complaint   Patient presents with     Acne       Vitals:    12/17/18 0856   BP: 118/80   BP Location: Right arm   Patient Position: Sitting   Cuff Size: Adult Regular   Pulse: 92   Resp: 20   Temp: 98.1  F (36.7  C)   TempSrc: Oral   SpO2: 99%   Height: 1.676 m (5' 6\")     Wt Readings from Last 1 Encounters:   10/29/18 77.1 kg (170 lb)       Jovanna WHYTE RN   Specialty Clin[facility]         "

## 2019-02-18 ENCOUNTER — OFFICE VISIT (OUTPATIENT)
Dept: DERMATOLOGY | Facility: CLINIC | Age: 39
End: 2019-02-18
Payer: COMMERCIAL

## 2019-02-18 VITALS — DIASTOLIC BLOOD PRESSURE: 72 MMHG | HEART RATE: 95 BPM | OXYGEN SATURATION: 99 % | SYSTOLIC BLOOD PRESSURE: 114 MMHG

## 2019-02-18 DIAGNOSIS — L70.0 ACNE VULGARIS: ICD-10-CM

## 2019-02-18 PROCEDURE — 99213 OFFICE O/P EST LOW 20 MIN: CPT | Performed by: PHYSICIAN ASSISTANT

## 2019-02-18 RX ORDER — SPIRONOLACTONE 50 MG/1
50 TABLET, FILM COATED ORAL DAILY
Qty: 90 TABLET | Refills: 1 | Status: SHIPPED | OUTPATIENT
Start: 2019-02-18 | End: 2019-07-22

## 2019-02-18 RX ORDER — SPIRONOLACTONE 100 MG/1
100 TABLET, FILM COATED ORAL DAILY
Qty: 90 TABLET | Refills: 1 | Status: SHIPPED | OUTPATIENT
Start: 2019-02-18 | End: 2019-08-01

## 2019-02-18 NOTE — LETTER
2/18/2019         RE: Shirlene Rodriguez  [address] [place] [address]        Dear Colleague,    Thank you for referring your patient, Shirlene Rodriguez, to the [facility]. Please see a copy of my visit note below.    HPI:   Shirlene Rodriguez is a 38 year old female who presents for recheck of acne currently on 100 mg of spironolactone.  Patient feels she is improving - still getting some breakouts around her menses.  On spironolactone and ampicillin currently. No adverse effects. Patient is using Rhodan and fields toner, moisturizer, and wash.  chief complaint  Condition has been present for: a while  Pt complains of pain: Yes     Previous treatments include: none  Areas Involved: face  Current Outpatient Medications   Medication Sig Dispense Refill     ampicillin (PRINCIPEN) 500 MG capsule Take one tab BID for 3 months 180 capsule 0     doxycycline monohydrate (MONODOX) 100 MG capsule Take one tab BID with food and full glass of water 60 capsule 1     levothyroxine (SYNTHROID/LEVOTHROID) 100 MCG tablet Take 1 tablet (100 mcg) by mouth daily 90 tablet 3     metoprolol succinate (TOPROL-XL) 25 MG 24 hr tablet Take 1 tablet (25 mg) by mouth daily 90 tablet 3     spironolactone (ALDACTONE) 100 MG tablet Take 1 tablet (100 mg) by mouth daily 90 tablet 1     spironolactone (ALDACTONE) 50 MG tablet Take 1 tablet (50 mg) by mouth daily 90 tablet 1     tretinoin (RETIN-A) 0.025 % external cream Apply pea size amount to entire face QD 45 g 11     Allergies   Allergen Reactions     No Known Allergies      Denies any other skin complaints, in general feels well: Yes  Review of symptoms otherwise negative:Yes    This document serves as a record of the services and decisions personally performed and made by Sasha Hughes PA-C. It was created on her behalf by Jes Tai, a trained medical scribe. The creation of this document is based the provider's statements to the medical scribe.  Jes Tai  December 17, 2018 9:03 AM    PHYSICAL EXAM:   A&Ox3: Yes   Well developed/well nourished female Yes   Mood appropriate Yes      /72   Pulse 95   SpO2 99%   Type 2 skin. Mood appropriate  Alert and Oriented X 3. Well developed, well nourished in no distress.  General appearance: Normal  Head including face: Normal  Eyes: conjunctiva and lids: Normal  Mouth: Lips, teeth, gums: Normal  Neck: Normal  Back: clear  Cardiovascular: Exam of peripheral vascular system by observation for swelling, varicosities, edema: Normal  Extremities: digits/nails (clubbing): Normal  Right upper extremity: Normal  Left upper extremity: Normal  Right lower extremity: Normal  Left lower extremity: Normal  Skin: Scalp and body hair: See below     Comedones Papules/Pustules Cysts Staining Scarring   Face/Neck 1+ 0 0 1+ 1-2+   Chest 0 0 0 0 0   Back 0 0 0 0 0     Telangiectasias: No Fixed Erythema: No Exoriations: No   Other Physical Exam Findings:    ASSESSMENT & PLAN:   1. Acne Vulgaris- Continues to improve but still has flares around menses. - advised on diagnosis and treatment options. Discussed use of topical medications and antibiotics.   --Increase to spironolactone 150 mg daily. Advised on potential for hypotension, teratogenetic effects, menstrual irregularities, hyperkalemia and need for Q 6 month K+ checks.  --Continue tretinoin 0.025% to entire face every day.        Pt advised on use and risks including photosensitivity, allergic reactions, GI upset, headaches, nausea, erythema, scaling, vertigo, asthralgias, blood clots:Yes    Follow-up: 6-8 months/PRN  CC:   Scribed By: Jes Tai Medical Scribe    The information in this document, created by the medical scribe for me, accurately reflects the services I personally performed and the decisions made by me. I have reviewed and approved this document for accuracy prior to leaving the patient care area.  Sasha Hughes PA-C December 17, 2018 9:06 AM      Again, thank you  for allowing me to participate in the care of your patient.        Sincerely,        Sasha Patel PA-C

## 2019-02-18 NOTE — PROGRESS NOTES
HPI:   Shirlene Rodriguez is a 38 year old female who presents for recheck of acne currently on 100 mg of spironolactone.  Patient feels she is improving - still getting some breakouts around her menses.  On spironolactone and ampicillin currently. No adverse effects. Patient is using Rhodan and fields toner, moisturizer, and wash.  chief complaint  Condition has been present for: a while  Pt complains of pain: Yes     Previous treatments include: none  Areas Involved: face  Current Outpatient Medications   Medication Sig Dispense Refill     ampicillin (PRINCIPEN) 500 MG capsule Take one tab BID for 3 months 180 capsule 0     doxycycline monohydrate (MONODOX) 100 MG capsule Take one tab BID with food and full glass of water 60 capsule 1     levothyroxine (SYNTHROID/LEVOTHROID) 100 MCG tablet Take 1 tablet (100 mcg) by mouth daily 90 tablet 3     metoprolol succinate (TOPROL-XL) 25 MG 24 hr tablet Take 1 tablet (25 mg) by mouth daily 90 tablet 3     spironolactone (ALDACTONE) 100 MG tablet Take 1 tablet (100 mg) by mouth daily 90 tablet 1     spironolactone (ALDACTONE) 50 MG tablet Take 1 tablet (50 mg) by mouth daily 90 tablet 1     tretinoin (RETIN-A) 0.025 % external cream Apply pea size amount to entire face QD 45 g 11     Allergies   Allergen Reactions     No Known Allergies      Denies any other skin complaints, in general feels well: Yes  Review of symptoms otherwise negative:Yes    This document serves as a record of the services and decisions personally performed and made by Sasha Hughes PA-C. It was created on her behalf by Jes Tai, a trained medical scribe. The creation of this document is based the provider's statements to the medical scribe.  Jes Tai December 17, 2018 9:03 AM    PHYSICAL EXAM:   A&Ox3: Yes   Well developed/well nourished female Yes   Mood appropriate Yes      /72   Pulse 95   SpO2 99%   Type 2 skin. Mood appropriate  Alert and Oriented X 3. Well developed, well  nourished in no distress.  General appearance: Normal  Head including face: Normal  Eyes: conjunctiva and lids: Normal  Mouth: Lips, teeth, gums: Normal  Neck: Normal  Back: clear  Cardiovascular: Exam of peripheral vascular system by observation for swelling, varicosities, edema: Normal  Extremities: digits/nails (clubbing): Normal  Right upper extremity: Normal  Left upper extremity: Normal  Right lower extremity: Normal  Left lower extremity: Normal  Skin: Scalp and body hair: See below     Comedones Papules/Pustules Cysts Staining Scarring   Face/Neck 1+ 0 0 1+ 1-2+   Chest 0 0 0 0 0   Back 0 0 0 0 0     Telangiectasias: No Fixed Erythema: No Exoriations: No   Other Physical Exam Findings:    ASSESSMENT & PLAN:   1. Acne Vulgaris- Continues to improve but still has flares around menses. - advised on diagnosis and treatment options. Discussed use of topical medications and antibiotics.   --Increase to spironolactone 150 mg daily. Advised on potential for hypotension, teratogenetic effects, menstrual irregularities, hyperkalemia and need for Q 6 month K+ checks.  --Continue tretinoin 0.025% to entire face every day.        Pt advised on use and risks including photosensitivity, allergic reactions, GI upset, headaches, nausea, erythema, scaling, vertigo, asthralgias, blood clots:Yes    Follow-up: 6-8 months/PRN  CC:   Scribed By: Jes Tai Medical Scribe    The information in this document, created by the medical scribe for me, accurately reflects the services I personally performed and the decisions made by me. I have reviewed and approved this document for accuracy prior to leaving the patient care area.  Sasha Hughes PA-C December 17, 2018 9:06 AM

## 2019-02-18 NOTE — NURSING NOTE
"Initial /72   Pulse 95   SpO2 99%  Estimated body mass index is 27.44 kg/m  as calculated from the following:    Height as of 12/17/18: 1.676 m (5' 6\").    Weight as of 10/29/18: 77.1 kg (170 lb). .      "

## 2019-02-21 DIAGNOSIS — L70.0 ACNE VULGARIS: ICD-10-CM

## 2019-02-21 RX ORDER — DOXYCYCLINE 100 MG/1
CAPSULE ORAL
Qty: 60 CAPSULE | Refills: 1 | Status: SHIPPED | OUTPATIENT
Start: 2019-02-21 | End: 2020-09-21

## 2019-02-21 NOTE — TELEPHONE ENCOUNTER
Reason for Call:  Medication or medication refill:    Do you use a [facility]?  Name of the pharmacy and phone number for the current request:  [facility] - [phone]    Name of the medication requested: doxycycline monohydrate    Other request: LOV:  2/18/19  LAST REFILL:  1/17/19      Can we leave a detailed message on this number? YES    Phone number patient can be reached at: Home number on file [phone] (home)    Best Time: any     Call taken on 2/21/2019 at 12:11 PM by nAgie Levy

## 2019-04-01 ENCOUNTER — OFFICE VISIT (OUTPATIENT)
Dept: FAMILY MEDICINE | Facility: CLINIC | Age: 39
End: 2019-04-01
Payer: COMMERCIAL

## 2019-04-01 ENCOUNTER — TELEPHONE (OUTPATIENT)
Dept: FAMILY MEDICINE | Facility: CLINIC | Age: 39
End: 2019-04-01

## 2019-04-01 VITALS
TEMPERATURE: 99.2 F | HEIGHT: 64 IN | WEIGHT: 169.9 LBS | SYSTOLIC BLOOD PRESSURE: 108 MMHG | DIASTOLIC BLOOD PRESSURE: 71 MMHG | BODY MASS INDEX: 29.01 KG/M2 | HEART RATE: 85 BPM

## 2019-04-01 DIAGNOSIS — F41.9 ANXIETY: ICD-10-CM

## 2019-04-01 DIAGNOSIS — J45.990 EXERCISE-INDUCED ASTHMA: Primary | ICD-10-CM

## 2019-04-01 DIAGNOSIS — E03.9 ACQUIRED HYPOTHYROIDISM: ICD-10-CM

## 2019-04-01 DIAGNOSIS — N92.0 MENORRHAGIA WITH REGULAR CYCLE: ICD-10-CM

## 2019-04-01 LAB
BASOPHILS # BLD AUTO: 0 10E9/L (ref 0–0.2)
BASOPHILS NFR BLD AUTO: 0.2 %
DIFFERENTIAL METHOD BLD: ABNORMAL
EOSINOPHIL # BLD AUTO: 0.1 10E9/L (ref 0–0.7)
EOSINOPHIL NFR BLD AUTO: 0.9 %
ERYTHROCYTE [DISTWIDTH] IN BLOOD BY AUTOMATED COUNT: 14.7 % (ref 10–15)
HCT VFR BLD AUTO: 40 % (ref 35–47)
HGB BLD-MCNC: 13 G/DL (ref 11.7–15.7)
LYMPHOCYTES # BLD AUTO: 3.1 10E9/L (ref 0.8–5.3)
LYMPHOCYTES NFR BLD AUTO: 32.8 %
MCH RBC QN AUTO: 25.7 PG (ref 26.5–33)
MCHC RBC AUTO-ENTMCNC: 32.5 G/DL (ref 31.5–36.5)
MCV RBC AUTO: 79 FL (ref 78–100)
MONOCYTES # BLD AUTO: 0.6 10E9/L (ref 0–1.3)
MONOCYTES NFR BLD AUTO: 6.2 %
NEUTROPHILS # BLD AUTO: 5.7 10E9/L (ref 1.6–8.3)
NEUTROPHILS NFR BLD AUTO: 59.9 %
PLATELET # BLD AUTO: 328 10E9/L (ref 150–450)
RBC # BLD AUTO: 5.05 10E12/L (ref 3.8–5.2)
WBC # BLD AUTO: 9.6 10E9/L (ref 4–11)

## 2019-04-01 PROCEDURE — 80048 BASIC METABOLIC PNL TOTAL CA: CPT | Performed by: PHYSICIAN ASSISTANT

## 2019-04-01 PROCEDURE — 36415 COLL VENOUS BLD VENIPUNCTURE: CPT | Performed by: PHYSICIAN ASSISTANT

## 2019-04-01 PROCEDURE — 82728 ASSAY OF FERRITIN: CPT | Performed by: PHYSICIAN ASSISTANT

## 2019-04-01 PROCEDURE — 84443 ASSAY THYROID STIM HORMONE: CPT | Performed by: PHYSICIAN ASSISTANT

## 2019-04-01 PROCEDURE — 85025 COMPLETE CBC W/AUTO DIFF WBC: CPT | Performed by: PHYSICIAN ASSISTANT

## 2019-04-01 PROCEDURE — 99214 OFFICE O/P EST MOD 30 MIN: CPT | Performed by: PHYSICIAN ASSISTANT

## 2019-04-01 RX ORDER — ALBUTEROL SULFATE 90 UG/1
2 AEROSOL, METERED RESPIRATORY (INHALATION) EVERY 6 HOURS PRN
Qty: 18 G | Refills: 1 | Status: SHIPPED | OUTPATIENT
Start: 2019-04-01 | End: 2021-09-29

## 2019-04-01 RX ORDER — BUSPIRONE HYDROCHLORIDE 5 MG/1
TABLET ORAL
Qty: 120 TABLET | Refills: 0 | Status: SHIPPED | OUTPATIENT
Start: 2019-04-01 | End: 2019-04-07

## 2019-04-01 RX ORDER — PROPRANOLOL HYDROCHLORIDE 10 MG/1
10 TABLET ORAL 2 TIMES DAILY PRN
Qty: 60 TABLET | Refills: 1 | Status: SHIPPED | OUTPATIENT
Start: 2019-04-01 | End: 2019-08-01

## 2019-04-01 ASSESSMENT — ANXIETY QUESTIONNAIRES
5. BEING SO RESTLESS THAT IT IS HARD TO SIT STILL: NOT AT ALL
7. FEELING AFRAID AS IF SOMETHING AWFUL MIGHT HAPPEN: SEVERAL DAYS
1. FEELING NERVOUS, ANXIOUS, OR ON EDGE: MORE THAN HALF THE DAYS
3. WORRYING TOO MUCH ABOUT DIFFERENT THINGS: SEVERAL DAYS
2. NOT BEING ABLE TO STOP OR CONTROL WORRYING: SEVERAL DAYS
GAD7 TOTAL SCORE: 7
6. BECOMING EASILY ANNOYED OR IRRITABLE: SEVERAL DAYS

## 2019-04-01 ASSESSMENT — MIFFLIN-ST. JEOR: SCORE: 1443.41

## 2019-04-01 ASSESSMENT — PATIENT HEALTH QUESTIONNAIRE - PHQ9
SUM OF ALL RESPONSES TO PHQ QUESTIONS 1-9: 4
5. POOR APPETITE OR OVEREATING: SEVERAL DAYS

## 2019-04-01 NOTE — PATIENT INSTRUCTIONS
We will let you know about your labwork  Talk to OBGYN about heavy periods    Start buspirone daily for anxiety - taper up  Use the propranolol as needed for anxiety at work    Consider PACO counseling:   Phone: [phone] or toll free 2-671-[email or web address]    Follow up in 4-6 weeks, sooner if needed for recheck

## 2019-04-01 NOTE — LETTER
My Asthma Action Plan  Name: Shirlene Rodriguez   YOB: 1980  Date: 4/1/2019   My doctor: Gwen Zambrano PA-C   My clinic: [facility]        My Control Medicine: None  My Rescue Medicine: Albuterol (Proair/Ventolin/Proventil) inhaler     My Asthma Severity: intermittent  Avoid your asthma triggers: use before exercise  pt is aware of triggers             GREEN ZONE   Good Control    I feel good    No cough or wheeze    Can work, sleep and play without asthma symptoms       Take your asthma control medicine every day.     1. If exercise triggers your asthma, take your rescue medication    15 minutes before exercise or sports, and    During exercise if you have asthma symptoms  2. Spacer to use with inhaler: If you have a spacer, make sure to use it with your inhaler             YELLOW ZONE Getting Worse  I have ANY of these:    I do not feel good    Cough or wheeze    Chest feels tight    Wake up at night   1. Keep taking your Green Zone medications  2. Start taking your rescue medicine:    every 20 minutes for up to 1 hour. Then every 4 hours for 24-48 hours.  3. If you stay in the Yellow Zone for more than 12-24 hours, contact your doctor.  4. If you do not return to the Green Zone in 12-24 hours or you get worse, start taking your oral steroid medicine if prescribed by your provider.           RED ZONE Medical Alert - Get Help  I have ANY of these:    I feel awful    Medicine is not helping    Breathing getting harder    Trouble walking or talking    Nose opens wide to breathe       1. Take your rescue medicine NOW  2. If your provider has prescribed an oral steroid medicine, start taking it NOW  3. Call your doctor NOW  4. If you are still in the Red Zone after 20 minutes and you have not reached your doctor:    Take your rescue medicine again and    Call 911 or go to the emergency room right away    See your regular doctor within 2 weeks of an Emergency Room or Urgent Care visit for  follow-up treatment.          Annual Reminders:  Meet with Asthma Educator,  Flu Shot in the Fall, consider Pneumonia Vaccination for patients with asthma (aged 19 and older).    Pharmacy: [facility] [place]  [place], [place] - [address]                      Asthma Triggers  How To Control Things That Make Your Asthma Worse    Triggers are things that make your asthma worse.  Look at the list below to help you find your triggers and what you can do about them.  You can help prevent asthma flare-ups by staying away from your triggers.      Trigger                                                          What you can do   Cigarette Smoke  Tobacco smoke can make asthma worse. Do not allow smoking in your home, car or around you.  Be sure no one smokes at a child s day care or school.  If you smoke, ask your health care provider for ways to help you quit.  Ask family members to quit too.  Ask your health care provider for a referral to Quit Plan to help you quit smoking, or call 9-106-197-PLAN.     Colds, Flu, Bronchitis  These are common triggers of asthma. Wash your hands often.  Don t touch your eyes, nose or mouth.  Get a flu shot every year.     Dust Mites  These are tiny bugs that live in cloth or carpet. They are too small to see. Wash sheets and blankets in hot water every week.   Encase pillows and mattress in dust mite proof covers.  Avoid having carpet if you can. If you have carpet, vacuum weekly.   Use a dust mask and HEPA vacuum.   Pollen and Outdoor Mold  Some people are allergic to trees, grass, or weed pollen, or molds. Try to keep your windows closed.  Limit time out doors when pollen count is high.   Ask you health care provider about taking medicine during allergy season.     Animal Dander  Some people are allergic to skin flakes, urine or saliva from pets with fur or feathers. Keep pets with fur or feathers out of your home.    If you can t keep the pet outdoors, then keep the pet out of  your bedroom.  Keep the bedroom door closed.  Keep pets off cloth furniture and away from stuffed toys.     Mice, Rats, and Cockroaches  Some people are allergic to the waste from these pests.   Cover food and garbage.  Clean up spills and food crumbs.  Store grease in the refrigerator.   Keep food out of the bedroom.   Indoor Mold  This can be a trigger if your home has high moisture. Fix leaking faucets, pipes, or other sources of water.   Clean moldy surfaces.  Dehumidify basement if it is damp and smelly.   Smoke, Strong Odors, and Sprays  These can reduce air quality. Stay away from strong odors and sprays, such as perfume, powder, hair spray, paints, smoke incense, paint, cleaning products, candles and new carpet.   Exercise or Sports  Some people with asthma have this trigger. Be active!  Ask your doctor about taking medicine before sports or exercise to prevent symptoms.    Warm up for 5-10 minutes before and after sports or exercise.     Other Triggers of Asthma  Cold air:  Cover your nose and mouth with a scarf.  Sometimes laughing or crying can be a trigger.  Some medicines and food can trigger asthma.

## 2019-04-01 NOTE — TELEPHONE ENCOUNTER
Panel Management Review      Patient has the following on her problem list:     Asthma review     ACT Total Scores 11/13/2017   ACT TOTAL SCORE -   ASTHMA ER VISITS -   ASTHMA HOSPITALIZATIONS -   ACT TOTAL SCORE (Goal Greater than or Equal to 20) 23   In the past 12 months, how many times did you visit the emergency room for your asthma without being admitted to the hospital? 0   In the past 12 months, how many times were you hospitalized overnight because of your asthma? 0      1. Is Asthma diagnosis on the Problem List? Yes    2. Is Asthma listed on Health Maintenance? Yes    3. Patient is due for:  ACT      Composite cancer screening  Chart review shows that this patient is due/due soon for the following None  Summary:    Patient is due/failing the following:   ACT    Action needed:   Patient needs to do ACT.    Type of outreach:    Copy of ACT mailed to patient, will reach out in 5 days.    Questions for provider review:    None                                                                                                                                    Ruperto Hall MA       Chart routed to  .

## 2019-04-01 NOTE — PROGRESS NOTES
SUBJECTIVE:   Shirlene Rodriguez is a 39 year old female who presents to clinic today for the following health issues:    New Patient/Transfer of Care  Hypothyroidism Follow-up, Refill on Synthroid      Since last visit, patient describes the following symptoms: Weight stable, no hair loss, no skin changes, no constipation, no loose stools and anxiety      Amount of exercise or physical activity: Very active during the day    Problems taking medications regularly: No    Medication side effects: none    Diet: regular (no restrictions)    *  Would like to discuss Anxiety, more situational has not been on any medications recently  Periods last about a week, heavy the whole time, pretty regular but every 3 weeks  - worse since giving birth    In the past: prozac, xanax, buspar. Last on prozac , propranolol    Was sick recently - worse asthma scores    She works with [employer], previously pharmacy tech  She has to give talks in front of people at work and this always makes her really anxious jittery/shaky    Problem list and histories reviewed & adjusted, as indicated.  Additional history: as documented    Patient Active Problem List   Diagnosis     GERD (gastroesophageal reflux disease)     CARDIOVASCULAR SCREENING; LDL GOAL LESS THAN 160     History of      Anxiety     Acne     Acquired hypothyroidism     Past Surgical History:   Procedure Laterality Date     CARDIAC STRESS TST,COMPLETE      Normal      SECTION  2012    Procedure:  SECTION;   SECTION;  Surgeon: Warner Ac MD;  Location:  L+D      SECTION, TUBAL LIGATION, COMBINED N/A 2016    Procedure: COMBINED  SECTION, TUBAL LIGATION;  Surgeon: Warner Ac MD;  Location: [facility]     GYN SURGERY  2012    C section     SURGICAL HISTORY OF -       root canal       Social History     Tobacco Use     Smoking status: Never Smoker     Smokeless tobacco: Never  "Used   Substance Use Topics     Alcohol use: No     Comment: occasinal     Family History   Problem Relation Age of Onset     Alcohol/Drug Mother         alcoholic     Pulmonary Embolism Mother      Prostate Cancer Father 65     Alzheimer Disease Paternal Grandfather      Myocardial Infarction Paternal Grandmother          Labs reviewed in EPIC    Reviewed and updated as needed this visit by clinical staff  Tobacco  Allergies  Meds  Problems  Med Hx  Surg Hx  Fam Hx  Soc Hx        Reviewed and updated as needed this visit by Provider  Tobacco  Allergies  Meds  Problems  Med Hx  Surg Hx  Fam Hx  Soc Hx          ROS:  Other than noted above, general, HEENT, respiratory, cardiac, MS, and gastrointestinal systems are negative.     OBJECTIVE:     /71   Pulse 85   Temp 99.2  F (37.3  C) (Tympanic)   Ht 1.638 m (5' 4.49\")   Wt 77.1 kg (169 lb 14.4 oz)   LMP 04/01/2019 (Exact Date)   BMI 28.72 kg/m    Body mass index is 28.72 kg/m .  GENERAL: healthy, alert and no distress  HENT: ear canals and TM's normal, nose and mouth without ulcers or lesions  NECK: no adenopathy, no asymmetry, masses, or scars and thyroid normal to palpation  RESP: lungs clear to auscultation - no rales, rhonchi or wheezes  CV: regular rate and rhythm, normal S1 S2, no S3 or S4, no murmur, click or rub, no peripheral edema and peripheral pulses strong  ABDOMEN: soft, nontender, no hepatosplenomegaly, no masses and bowel sounds normal  MS: no gross musculoskeletal defects noted, no edema  PSYCH: Alert and oriented times 3; speech- coherent , normal rate and volume; able to articulate logical thoughts, able to abstract reason, no tangential thoughts, no hallucinations or delusions, affect- normal     ASSESSMENT/PLAN:     ASSESSMENT/PLAN:      ICD-10-CM    1. Exercise-induced asthma J45.990 albuterol (PROAIR HFA/PROVENTIL HFA/VENTOLIN HFA) 108 (90 Base) MCG/ACT inhaler     Asthma Action Plan (AAP)   2. Menorrhagia with regular " cycle N92.0 CBC with platelets differential     Ferritin     OB/GYN REFERRAL   3. Anxiety F41.9 Basic metabolic panel     propranolol (INDERAL) 10 MG tablet     busPIRone (BUSPAR) 5 MG tablet   4. Acquired hypothyroidism E03.9 TSH with free T4 reflex     levothyroxine (SYNTHROID/LEVOTHROID) 100 MCG tablet     Discussed risks/benefits of medications    Patient Instructions   We will let you know about your labwork  Talk to OBGYN about heavy periods    Start buspirone daily for anxiety - taper up  Use the propranolol as needed for anxiety at work    Consider PACO counseling:   Phone: [phone] or toll free [email or web address]    Follow up in 4-6 weeks, sooner if needed for recheck    Gwen Zambrano PA-C  [facility]

## 2019-04-01 NOTE — LETTER
[address] [id number] [phone]        Shirlene Rodriguez  [address] [place] [address]      April 1, 2019      Dear Shirlene,    I care about your health and have reviewed your health plan, including your medical conditions, medication list, and lab results and am making recommendations based on this review, to better manage your health.    You are in particular need of attention regarding:  -Asthma    I am recommending that you:  -Complete and return the attached ASTHMA CONTROL TEST.  If your total score is 19 or less or you have been to the ER or urgent care for your asthma, then please schedule an asthma followup appointment.    If you've had the preventative screening completed at another facility or feel you're not due for this screening, please call our clinic at the number listed above or send us a My Chart message so we can update our records. We would like to thank you in advance for taking the time to take care of your health.  If you have any questions, please don t hesitate to contact our clinic.    Sincerely,       Your [facility] Team

## 2019-04-02 LAB
ANION GAP SERPL CALCULATED.3IONS-SCNC: 6 MMOL/L (ref 3–14)
BUN SERPL-MCNC: 9 MG/DL (ref 7–30)
CALCIUM SERPL-MCNC: 8.9 MG/DL (ref 8.5–10.1)
CHLORIDE SERPL-SCNC: 104 MMOL/L (ref 94–109)
CO2 SERPL-SCNC: 29 MMOL/L (ref 20–32)
CREAT SERPL-MCNC: 0.83 MG/DL (ref 0.52–1.04)
FERRITIN SERPL-MCNC: 10 NG/ML (ref 12–150)
GFR SERPL CREATININE-BSD FRML MDRD: 89 ML/MIN/{1.73_M2}
GLUCOSE SERPL-MCNC: 74 MG/DL (ref 70–99)
POTASSIUM SERPL-SCNC: 4.2 MMOL/L (ref 3.4–5.3)
SODIUM SERPL-SCNC: 139 MMOL/L (ref 133–144)
TSH SERPL DL<=0.005 MIU/L-ACNC: 0.96 MU/L (ref 0.4–4)

## 2019-04-02 RX ORDER — LEVOTHYROXINE SODIUM 100 UG/1
100 TABLET ORAL DAILY
Qty: 90 TABLET | Refills: 3 | Status: SHIPPED | OUTPATIENT
Start: 2019-04-02 | End: 2020-03-23

## 2019-04-02 ASSESSMENT — ANXIETY QUESTIONNAIRES: GAD7 TOTAL SCORE: 7

## 2019-04-02 ASSESSMENT — ASTHMA QUESTIONNAIRES: ACT_TOTALSCORE: 20

## 2019-04-28 ENCOUNTER — MYC MEDICAL ADVICE (OUTPATIENT)
Dept: FAMILY MEDICINE | Facility: CLINIC | Age: 39
End: 2019-04-28

## 2019-04-28 DIAGNOSIS — F41.9 ANXIETY: ICD-10-CM

## 2019-04-29 RX ORDER — BUSPIRONE HYDROCHLORIDE 15 MG/1
15 TABLET ORAL 2 TIMES DAILY
Qty: 60 TABLET | Refills: 1 | Status: SHIPPED | OUTPATIENT
Start: 2019-04-29 | End: 2019-06-24

## 2019-06-24 DIAGNOSIS — F41.9 ANXIETY: ICD-10-CM

## 2019-06-24 NOTE — TELEPHONE ENCOUNTER
"BUSPIRONE HCL 15MG TABS      Last Written Prescription Date:  4/29/19  Last Fill Quantity: 60,   # refills: 1  Last Office Visit: 4/1/19  Future Office visit:    Next 5 appointments (look out 90 days)    Sep 09, 2019  5:30 PM CDT  [facility] Visit with Sasha Hughes PA-C  [facility] [address] [facility] [address] [phone]           Requested Prescriptions   Pending Prescriptions Disp Refills     busPIRone (BUSPAR) 15 MG tablet [Pharmacy Med Name: BUSPIRONE HCL 15MG TABS] 60 tablet 1     Sig: TAKE ONE TABLET BY MOUTH TWICE A DAY       Atypical Antidepressants Protocol Passed - 6/24/2019  5:05 PM        Passed - Recent (12 mo) or future (30 days) visit within the authorizing provider's specialty     Patient had office visit in the last 12 months or has a visit in the next 30 days with authorizing provider or within the authorizing provider's specialty.  See \"Patient Info\" tab in inbasket, or \"Choose Columns\" in Meds & Orders section of the refill encounter.              Passed - Medication active on med list        Passed - Patient is age 18 or older        Passed - No active pregnancy on record        Passed - No positive pregnancy test in past 12 mos          "

## 2019-06-25 RX ORDER — BUSPIRONE HYDROCHLORIDE 15 MG/1
TABLET ORAL
Qty: 60 TABLET | Refills: 0 | Status: SHIPPED | OUTPATIENT
Start: 2019-06-25 | End: 2019-08-01

## 2019-06-25 NOTE — TELEPHONE ENCOUNTER
Medication is being filled for 1 time refill only due to:  Patient needs to be seen because needs follow up after dose increase.   Ladarius Vides RN

## 2019-07-22 DIAGNOSIS — L70.0 ACNE VULGARIS: ICD-10-CM

## 2019-07-22 RX ORDER — SPIRONOLACTONE 50 MG/1
50 TABLET, FILM COATED ORAL DAILY
Qty: 90 TABLET | Refills: 0 | Status: SHIPPED | OUTPATIENT
Start: 2019-07-22 | End: 2019-09-09

## 2019-07-22 NOTE — TELEPHONE ENCOUNTER
Reason for Call:  Medication or medication refill:    Do you use a [facility]?  Name of the pharmacy and phone number for the current request:  [facility]: [phone]    Name of the medication requested: Spironolactone    Other request:   LAST REFILL: 05/13/2019  LOV: 02/18/2019    Can we leave a detailed message on this number? Not Applicable    Phone number patient can be reached at: Home number on file [phone] (home)    Best Time: NA    Call taken on 7/22/2019 at 9:58 AM by Denise Behrendt

## 2019-08-01 ENCOUNTER — OFFICE VISIT (OUTPATIENT)
Dept: FAMILY MEDICINE | Facility: CLINIC | Age: 39
End: 2019-08-01
Payer: COMMERCIAL

## 2019-08-01 VITALS
HEIGHT: 65 IN | WEIGHT: 165 LBS | DIASTOLIC BLOOD PRESSURE: 68 MMHG | TEMPERATURE: 98.4 F | SYSTOLIC BLOOD PRESSURE: 101 MMHG | BODY MASS INDEX: 27.49 KG/M2 | HEART RATE: 84 BPM

## 2019-08-01 DIAGNOSIS — F41.9 ANXIETY: ICD-10-CM

## 2019-08-01 PROCEDURE — 99213 OFFICE O/P EST LOW 20 MIN: CPT | Performed by: PHYSICIAN ASSISTANT

## 2019-08-01 RX ORDER — SPIRONOLACTONE 100 MG/1
TABLET, FILM COATED ORAL
COMMUNITY
Start: 2019-07-19 | End: 2019-09-09

## 2019-08-01 RX ORDER — PROPRANOLOL HYDROCHLORIDE 10 MG/1
10 TABLET ORAL 2 TIMES DAILY PRN
Qty: 60 TABLET | Refills: 3 | Status: SHIPPED | OUTPATIENT
Start: 2019-08-01 | End: 2020-07-13

## 2019-08-01 RX ORDER — BUSPIRONE HYDROCHLORIDE 15 MG/1
15 TABLET ORAL 2 TIMES DAILY
Qty: 180 TABLET | Refills: 1 | Status: SHIPPED | OUTPATIENT
Start: 2019-08-01 | End: 2019-12-30

## 2019-08-01 ASSESSMENT — ANXIETY QUESTIONNAIRES
2. NOT BEING ABLE TO STOP OR CONTROL WORRYING: SEVERAL DAYS
7. FEELING AFRAID AS IF SOMETHING AWFUL MIGHT HAPPEN: NOT AT ALL
GAD7 TOTAL SCORE: 4
5. BEING SO RESTLESS THAT IT IS HARD TO SIT STILL: NOT AT ALL
6. BECOMING EASILY ANNOYED OR IRRITABLE: SEVERAL DAYS
3. WORRYING TOO MUCH ABOUT DIFFERENT THINGS: SEVERAL DAYS
1. FEELING NERVOUS, ANXIOUS, OR ON EDGE: SEVERAL DAYS

## 2019-08-01 ASSESSMENT — MIFFLIN-ST. JEOR: SCORE: 1426.19

## 2019-08-01 ASSESSMENT — PATIENT HEALTH QUESTIONNAIRE - PHQ9
5. POOR APPETITE OR OVEREATING: NOT AT ALL
SUM OF ALL RESPONSES TO PHQ QUESTIONS 1-9: 3

## 2019-08-01 NOTE — PROGRESS NOTES
SUBJECTIVE:                                                    Shirlene Rodriguez is a 39 year old female who presents to clinic today for the following health issues:    Depression and Anxiety Follow-Up - Buspar 15 mg 1 tablet 2 times daily, Propranolol 10 mg as needed for anxiety    How are you doing with your depression since your last visit? Improved, things are going well on current medication doses    How are you doing with your anxiety since your last visit?  Improved     Are you having other symptoms that might be associated with depression or anxiety? No    Have you had a significant life event? No     Do you have any concerns with your use of alcohol or other drugs? No    Social History     Tobacco Use     Smoking status: Never Smoker     Smokeless tobacco: Never Used   Substance Use Topics     Alcohol use: No     Comment: occasinal     Drug use: No     PHQ 2016 10/26/2016 2019   PHQ-9 Total Score 5 5 4   Q9: Thoughts of better off dead/self-harm past 2 weeks Not at all Not at all Not at all     VANESSA-7 SCORE 2014   Total Score 6 -   Total Score - 7       Suicide Assessment Five-step Evaluation and Treatment (SAFE-T)    Amount of exercise or physical activity: 2-3 days/week for an average of 30-45 minutes    Problems taking medications regularly: No    Medication side effects: none    Diet: regular (no restrictions)    Uses the propranolol, like before meetings or speaking in front of groups  A couple times a week      Problem list and histories reviewed & adjusted, as indicated.  Additional history: none    Patient Active Problem List   Diagnosis     GERD (gastroesophageal reflux disease)     History of      Anxiety     Acne     Acquired hypothyroidism     Past Surgical History:   Procedure Laterality Date     CARDIAC STRESS TST,COMPLETE      Normal      SECTION  2012    Procedure:  SECTION;   SECTION;  Surgeon: Warner Ac MD;   "Location: [facility]      SECTION, TUBAL LIGATION, COMBINED N/A 2016    Procedure: COMBINED  SECTION, TUBAL LIGATION;  Surgeon: Warner Ac MD;  Location: [facility]     GYN SURGERY  2012    C section     SURGICAL HISTORY OF -       root canal       Social History     Tobacco Use     Smoking status: Never Smoker     Smokeless tobacco: Never Used   Substance Use Topics     Alcohol use: No     Comment: occasinal     Family History   Problem Relation Age of Onset     Alcohol/Drug Mother         alcoholic     Pulmonary Embolism Mother      Prostate Cancer Father 65     Alzheimer Disease Paternal Grandfather      Myocardial Infarction Paternal Grandmother          BP Readings from Last 3 Encounters:   19 101/68   19 108/71   19 114/72    Wt Readings from Last 3 Encounters:   19 74.8 kg (165 lb)   19 77.1 kg (169 lb 14.4 oz)   10/29/18 77.1 kg (170 lb)           ROS:  Other than noted above, general, HEENT, respiratory, cardiac, MS, and gastrointestinal systems are negative.     OBJECTIVE:                                                    /68   Pulse 84   Temp 98.4  F (36.9  C) (Tympanic)   Ht 1.646 m (5' 4.8\")   Wt 74.8 kg (165 lb)   LMP 2019   BMI 27.62 kg/m   Body mass index is 27.62 kg/m .   GENERAL: healthy, alert, well nourished, well hydrated, no distress  RESP: lungs clear to auscultation - no rales, no rhonchi, no wheezes  CV: regular rates and rhythm, normal S1 S2, no S3 or S4 and no murmur, no click or rub -  PSYCH: Alert and oriented times 3; speech- coherent , normal rate and volume; able to articulate logical thoughts, able to abstract reason, no tangential thoughts, no hallucinations or delusions, affect- normal       ASSESSMENT/PLAN:                                                      ASSESSMENT/PLAN:      ICD-10-CM    1. Anxiety F41.9 busPIRone (BUSPAR) 15 MG tablet     propranolol (INDERAL) 10 MG tablet     Medications " working well    Patient Instructions   Continue current medications    Gwen Zambrano PA-C   [facility]

## 2019-08-02 ASSESSMENT — ANXIETY QUESTIONNAIRES: GAD7 TOTAL SCORE: 4

## 2019-08-02 ASSESSMENT — ASTHMA QUESTIONNAIRES: ACT_TOTALSCORE: 25

## 2019-09-09 ENCOUNTER — OFFICE VISIT (OUTPATIENT)
Dept: DERMATOLOGY | Facility: CLINIC | Age: 39
End: 2019-09-09
Payer: COMMERCIAL

## 2019-09-09 VITALS — DIASTOLIC BLOOD PRESSURE: 68 MMHG | SYSTOLIC BLOOD PRESSURE: 107 MMHG | OXYGEN SATURATION: 98 % | HEART RATE: 97 BPM

## 2019-09-09 DIAGNOSIS — L70.0 ACNE VULGARIS: ICD-10-CM

## 2019-09-09 PROCEDURE — 99213 OFFICE O/P EST LOW 20 MIN: CPT | Performed by: PHYSICIAN ASSISTANT

## 2019-09-09 RX ORDER — TRETINOIN 0.25 MG/G
CREAM TOPICAL
Qty: 45 G | Refills: 11 | Status: SHIPPED | OUTPATIENT
Start: 2019-09-09 | End: 2020-09-21

## 2019-09-09 RX ORDER — SPIRONOLACTONE 50 MG/1
50 TABLET, FILM COATED ORAL DAILY
Qty: 90 TABLET | Refills: 3 | Status: SHIPPED | OUTPATIENT
Start: 2019-09-09 | End: 2020-09-11

## 2019-09-09 RX ORDER — SPIRONOLACTONE 100 MG/1
TABLET, FILM COATED ORAL
Qty: 90 TABLET | Refills: 3 | Status: SHIPPED | OUTPATIENT
Start: 2019-09-09 | End: 2020-09-11

## 2019-09-09 NOTE — PROGRESS NOTES
HPI:   CC: Shirlnee Rodriguez is a 38 year old female who presents for recheck of acne currently on 150 mg of spironolactone and doing well.  Mild flaring around her menses but overall well controlled.  No adverse effects. Patient is using Rhodan and fields toner, moisturizer, and wash.  Condition has been present for: a while  Pt complains of pain: Yes     Previous treatments include: as above   Areas Involved: face  Current Outpatient Medications   Medication Sig Dispense Refill     busPIRone (BUSPAR) 15 MG tablet Take 1 tablet (15 mg) by mouth 2 times daily 180 tablet 1     levothyroxine (SYNTHROID/LEVOTHROID) 100 MCG tablet Take 1 tablet (100 mcg) by mouth daily 90 tablet 3     propranolol (INDERAL) 10 MG tablet Take 1 tablet (10 mg) by mouth 2 times daily as needed (anxiety) 60 tablet 3     spironolactone (ALDACTONE) 100 MG tablet        spironolactone (ALDACTONE) 50 MG tablet Take 1 tablet (50 mg) by mouth daily 90 tablet 0     tretinoin (RETIN-A) 0.025 % external cream Apply pea size amount to entire face QD 45 g 11     albuterol (PROAIR HFA/PROVENTIL HFA/VENTOLIN HFA) 108 (90 Base) MCG/ACT inhaler Inhale 2 puffs into the lungs every 6 hours as needed for shortness of breath / dyspnea or wheezing (Patient not taking: Reported on 8/1/2019) 18 g 1     doxycycline monohydrate (MONODOX) 100 MG capsule Take one tab BID with food and full glass of water (Patient not taking: Reported on 8/1/2019) 60 capsule 1     Allergies   Allergen Reactions     No Known Allergies      Denies any other skin complaints, in general feels well: Yes  Review of symptoms otherwise negative:Yes    PHYSICAL EXAM:   A&Ox3: Yes   Well developed/well nourished female Yes   Mood appropriate Yes      /68   Pulse 97   SpO2 98%   Type 2 skin. Mood appropriate  Alert and Oriented X 3. Well developed, well nourished in no distress.  General appearance: Normal  Head including face: Normal  Eyes: conjunctiva and lids: Normal  Mouth: Lips,  teeth, gums: Normal  Neck: Normal  Back: clear  Cardiovascular: Exam of peripheral vascular system by observation for swelling, varicosities, edema: Normal  Extremities: digits/nails (clubbing): Normal  Right upper extremity: Normal  Left upper extremity: Normal  Right lower extremity: Normal  Left lower extremity: Normal  Skin: Scalp and body hair: See below     Comedones Papules/Pustules Cysts Staining Scarring   Face/Neck 1+ 0 0 0 1-2+   Chest 0 0 0 0 0   Back 0 0 0 0 0     Telangiectasias: No Fixed Erythema: No Exoriations: No   Other Physical Exam Findings:    ASSESSMENT & PLAN:   1. Acne Vulgaris- Continues to improve; mild flaring with menses but overall pleased with results. She is very oily; briefly discussed isotretinoin and informational brochure given. She will think about this for the future. Advised on diagnosis and treatment options. Discussed use of topical medications and antibiotics.   --Continue spironolactone 150 mg daily. Advised on potential for hypotension, teratogenetic effects, menstrual irregularities, hyperkalemia and need for Q 6 month K+ checks.  --Continue tretinoin 0.025% to entire face every day.        Pt advised on use and risks including photosensitivity, allergic reactions, GI upset, headaches, nausea, erythema, scaling, vertigo, asthralgias, blood clots:Yes    Follow-up: yearly if doing well/PRN sooner  CC:   Scribed By:Sasha Hughes PA-C

## 2019-09-09 NOTE — LETTER
9/9/2019         RE: Shirlene Rodriguez  [address] [place] [address]        Dear Colleague,    Thank you for referring your patient, Shirlene Rodriguez, to the [facility]. Please see a copy of my visit note below.    HPI:   CC: Shirlene Rodriguez is a 38 year old female who presents for recheck of acne currently on 150 mg of spironolactone and doing well.  Mild flaring around her menses but overall well controlled.  No adverse effects. Patient is using Rhodan and fields toner, moisturizer, and wash.  Condition has been present for: a while  Pt complains of pain: Yes     Previous treatments include: as above   Areas Involved: face  Current Outpatient Medications   Medication Sig Dispense Refill     busPIRone (BUSPAR) 15 MG tablet Take 1 tablet (15 mg) by mouth 2 times daily 180 tablet 1     levothyroxine (SYNTHROID/LEVOTHROID) 100 MCG tablet Take 1 tablet (100 mcg) by mouth daily 90 tablet 3     propranolol (INDERAL) 10 MG tablet Take 1 tablet (10 mg) by mouth 2 times daily as needed (anxiety) 60 tablet 3     spironolactone (ALDACTONE) 100 MG tablet        spironolactone (ALDACTONE) 50 MG tablet Take 1 tablet (50 mg) by mouth daily 90 tablet 0     tretinoin (RETIN-A) 0.025 % external cream Apply pea size amount to entire face QD 45 g 11     albuterol (PROAIR HFA/PROVENTIL HFA/VENTOLIN HFA) 108 (90 Base) MCG/ACT inhaler Inhale 2 puffs into the lungs every 6 hours as needed for shortness of breath / dyspnea or wheezing (Patient not taking: Reported on 8/1/2019) 18 g 1     doxycycline monohydrate (MONODOX) 100 MG capsule Take one tab BID with food and full glass of water (Patient not taking: Reported on 8/1/2019) 60 capsule 1     Allergies   Allergen Reactions     No Known Allergies      Denies any other skin complaints, in general feels well: Yes  Review of symptoms otherwise negative:Yes    PHYSICAL EXAM:   A&Ox3: Yes   Well developed/well nourished female Yes   Mood appropriate Yes      BP  107/68   Pulse 97   SpO2 98%   Type 2 skin. Mood appropriate  Alert and Oriented X 3. Well developed, well nourished in no distress.  General appearance: Normal  Head including face: Normal  Eyes: conjunctiva and lids: Normal  Mouth: Lips, teeth, gums: Normal  Neck: Normal  Back: clear  Cardiovascular: Exam of peripheral vascular system by observation for swelling, varicosities, edema: Normal  Extremities: digits/nails (clubbing): Normal  Right upper extremity: Normal  Left upper extremity: Normal  Right lower extremity: Normal  Left lower extremity: Normal  Skin: Scalp and body hair: See below     Comedones Papules/Pustules Cysts Staining Scarring   Face/Neck 1+ 0 0 0 1-2+   Chest 0 0 0 0 0   Back 0 0 0 0 0     Telangiectasias: No Fixed Erythema: No Exoriations: No   Other Physical Exam Findings:    ASSESSMENT & PLAN:   1. Acne Vulgaris- Continues to improve; mild flaring with menses but overall pleased with results. She is very oily; briefly discussed isotretinoin and informational brochure given. She will think about this for the future. Advised on diagnosis and treatment options. Discussed use of topical medications and antibiotics.   --Continue spironolactone 150 mg daily. Advised on potential for hypotension, teratogenetic effects, menstrual irregularities, hyperkalemia and need for Q 6 month K+ checks.  --Continue tretinoin 0.025% to entire face every day.        Pt advised on use and risks including photosensitivity, allergic reactions, GI upset, headaches, nausea, erythema, scaling, vertigo, asthralgias, blood clots:Yes    Follow-up: yearly if doing well/PRN sooner  CC:   Scribed By:Sasha Hughes PA-C      Again, thank you for allowing me to participate in the care of your patient.        Sincerely,        Sasha Hughes PA-C

## 2019-11-06 ENCOUNTER — HEALTH MAINTENANCE LETTER (OUTPATIENT)
Age: 39
End: 2019-11-06

## 2019-12-30 DIAGNOSIS — F41.9 ANXIETY: ICD-10-CM

## 2019-12-30 RX ORDER — BUSPIRONE HYDROCHLORIDE 15 MG/1
TABLET ORAL
Qty: 180 TABLET | Refills: 2 | Status: SHIPPED | OUTPATIENT
Start: 2019-12-30 | End: 2020-09-11

## 2019-12-30 NOTE — TELEPHONE ENCOUNTER
"BUSPIRONE HCL 15MG TABS      Last Written Prescription Date:  8/1/19  Last Fill Quantity: 180,   # refills: 1  Last Office Visit: 8/1/19  Future Office visit:       Requested Prescriptions   Pending Prescriptions Disp Refills     busPIRone (BUSPAR) 15 MG tablet [Pharmacy Med Name: BUSPIRONE HCL 15MG TABS] 180 tablet 1     Sig: TAKE ONE TABLET BY MOUTH TWICE A DAY       Atypical Antidepressants Protocol Passed - 12/30/2019 10:20 AM        Passed - Recent (12 mo) or future (30 days) visit within the authorizing provider's specialty     Patient has had an office visit with the authorizing provider or a provider within the authorizing providers department within the previous 12 mos or has a future within next 30 days. See \"Patient Info\" tab in inbasket, or \"Choose Columns\" in Meds & Orders section of the refill encounter.              Passed - Medication active on med list        Passed - Patient is age 18 or older        Passed - No active pregnancy on record        Passed - No positive pregnancy test in past 12 mos            "

## 2019-12-31 DIAGNOSIS — L70.0 ACNE VULGARIS: ICD-10-CM

## 2019-12-31 NOTE — TELEPHONE ENCOUNTER
Reason for Call:  Medication or medication refill:    Do you use a [facility]?  Name of the pharmacy and phone number for the current request:  [facility] - [phone]    Name of the medication requested: doxycycline monohydrate    Other request: LOV:  9/9/19  LAST REFILL:  11/7/19    Can we leave a detailed message on this number? YES    Phone number patient can be reached at: Home number on file [phone] (home)    Best Time: any     Call taken on 12/31/2019 at 3:23 PM by Angie Levy

## 2020-01-02 RX ORDER — DOXYCYCLINE 100 MG/1
CAPSULE ORAL
Qty: 60 CAPSULE | Refills: 1 | OUTPATIENT
Start: 2020-01-02

## 2020-01-02 NOTE — TELEPHONE ENCOUNTER
Refill denied as pt has not been taking this for the last 10 months.   Liliana CROWELL RN BSN PHN  Specialty Clinics

## 2020-01-08 DIAGNOSIS — L70.0 ACNE VULGARIS: ICD-10-CM

## 2020-01-10 RX ORDER — DOXYCYCLINE 100 MG/1
CAPSULE ORAL
Qty: 60 CAPSULE | Refills: 1 | OUTPATIENT
Start: 2020-01-10

## 2020-01-10 NOTE — TELEPHONE ENCOUNTER
Requested Prescriptions   Pending Prescriptions Disp Refills     doxycycline monohydrate (MONODOX) 100 MG capsule 60 capsule 1     Sig: Take one tab BID with food and full glass of water       There is no refill protocol information for this order        Last Written Prescription Date:    Last Fill Quantity: ,  # refills:    Last office visit: 9/9/2019 with prescribing provider:  Sasha Nicole Office Visit:

## 2020-01-10 NOTE — TELEPHONE ENCOUNTER
Pt has not been taking this for almost a year. Refill denied.  Liliana CROWELL RN BSN PHN  Specialty Clinics

## 2020-01-20 DIAGNOSIS — Z20.828 EXPOSURE TO INFLUENZA: Primary | ICD-10-CM

## 2020-01-20 RX ORDER — OSELTAMIVIR PHOSPHATE 75 MG/1
75 CAPSULE ORAL DAILY
Qty: 10 CAPSULE | Refills: 0 | Status: SHIPPED | OUTPATIENT
Start: 2020-01-20 | End: 2020-01-30

## 2020-03-23 DIAGNOSIS — E03.9 ACQUIRED HYPOTHYROIDISM: ICD-10-CM

## 2020-03-23 RX ORDER — LEVOTHYROXINE SODIUM 100 UG/1
TABLET ORAL
Qty: 90 TABLET | Refills: 3 | Status: SHIPPED | OUTPATIENT
Start: 2020-03-23 | End: 2021-03-18

## 2020-03-23 NOTE — TELEPHONE ENCOUNTER
"LEVOTHYROXINE SODIUM 100MCG TABS      Last Written Prescription Date:  4/2/19  Last Fill Quantity: 90,   # refills: 3  Last Office Visit: 8/1/19  Future Office visit:       Requested Prescriptions   Pending Prescriptions Disp Refills     levothyroxine (SYNTHROID/LEVOTHROID) 100 MCG tablet [Pharmacy Med Name: LEVOTHYROXINE SODIUM 100MCG TABS] 90 tablet 3     Sig: TAKE ONE TABLET BY MOUTH ONCE DAILY       Thyroid Protocol Passed - 3/23/2020  9:37 AM        Passed - Patient is 12 years or older        Passed - Recent (12 mo) or future (30 days) visit within the authorizing provider's specialty     Patient has had an office visit with the authorizing provider or a provider within the authorizing providers department within the previous 12 mos or has a future within next 30 days. See \"Patient Info\" tab in inbasket, or \"Choose Columns\" in Meds & Orders section of the refill encounter.              Passed - Medication is active on med list        Passed - Normal TSH on file in past 12 months     Recent Labs   Lab Test 04/01/19  1809   TSH 0.96              Passed - No active pregnancy on record     If patient is pregnant or has had a positive pregnancy test, please check TSH.          Passed - No positive pregnancy test in past 12 months     If patient is pregnant or has had a positive pregnancy test, please check TSH.               "

## 2020-07-11 DIAGNOSIS — F41.9 ANXIETY: ICD-10-CM

## 2020-07-11 NOTE — LETTER
[facility]  [address]  [place] [facility] [address] [phone]        July 13, 2020  Shirlene Rodriguez  [address]  [place] [address]    Dear Shirlene,    I care about your health and have reviewed your health plan. I have reviewed your medical conditions, medication list, and lab results and am making recommendations based on this review, to better manage your health.    You are in particular need of attention regarding:  -Wellness (Physical) Visit     I am recommending that you:  -schedule a WELLNESS (Physical) APPOINTMENT with me around 8/1/2020.        Here is a list of Health Maintenance topics that are due now or due soon:  Health Maintenance Due   Topic Date Due     ASTHMA CONTROL TEST  02/01/2020     PREVENTIVE CARE VISIT  04/01/2020     TSH W/FREE T4 REFLEX  04/01/2020     ASTHMA ACTION PLAN  04/01/2020       Please call us at [phone] to schedule.     Thank you for trusting [facility] and we appreciate the opportunity to serve you.  We look forward to supporting your healthcare needs in the future.    Healthy Regards,    Gwen Zambrano PA-C/Kateryna DOMINGUEZ, RN, BSN

## 2020-07-13 RX ORDER — PROPRANOLOL HYDROCHLORIDE 10 MG/1
TABLET ORAL
Qty: 60 TABLET | Refills: 0 | Status: SHIPPED | OUTPATIENT
Start: 2020-07-13 | End: 2020-09-11

## 2020-07-13 NOTE — TELEPHONE ENCOUNTER
Medication is being filled for 1 time refill only due to:  Patient needs to be seen because due for 1 year follow up around 8/1/2020.. Letter mailed to lavelle DOMINGUEZ RN, BSN

## 2020-08-25 ENCOUNTER — OFFICE VISIT (OUTPATIENT)
Dept: OBGYN | Facility: CLINIC | Age: 40
End: 2020-08-25
Payer: COMMERCIAL

## 2020-08-25 VITALS
WEIGHT: 152.8 LBS | HEART RATE: 93 BPM | DIASTOLIC BLOOD PRESSURE: 73 MMHG | SYSTOLIC BLOOD PRESSURE: 102 MMHG | HEIGHT: 65 IN | TEMPERATURE: 99 F | BODY MASS INDEX: 25.46 KG/M2 | RESPIRATION RATE: 16 BRPM

## 2020-08-25 DIAGNOSIS — Z30.430 ENCOUNTER FOR INSERTION OF MIRENA IUD: ICD-10-CM

## 2020-08-25 DIAGNOSIS — Z30.430 ENCOUNTER FOR INSERTION OF INTRAUTERINE CONTRACEPTIVE DEVICE: Primary | ICD-10-CM

## 2020-08-25 DIAGNOSIS — R10.2 PELVIC PAIN IN FEMALE: ICD-10-CM

## 2020-08-25 PROCEDURE — 58300 INSERT INTRAUTERINE DEVICE: CPT | Performed by: OBSTETRICS & GYNECOLOGY

## 2020-08-25 PROCEDURE — 99203 OFFICE O/P NEW LOW 30 MIN: CPT | Mod: 25 | Performed by: OBSTETRICS & GYNECOLOGY

## 2020-08-25 ASSESSMENT — MIFFLIN-ST. JEOR: SCORE: 1361.04

## 2020-08-25 NOTE — PROGRESS NOTES
CC:  Consult from herself for left pelvic pain and IUD placement for menorrhagia  HPI:  Shirlene Rodriguez is a 40 year old female is a   .  Patient's last menstrual period was 2020.  Menses are regular q 28-30 days, lasting 5 days.  Periods are described as heavy and to change tampon every 2-3 hours initially but soaking through at night.  She works from home so she does not have to take close with her to work.  She does frequent the bathroom more frequently with heavy bleeding.  She denies any cramping.    She had 3 months of left-sided sharp pelvic pain which may last 15 minutes at a time.  Will happen at night when she is rolling over.mother exacerbating events.  She has no bowel or bladder changes.  No trauma she has no history of kidney stones.  The pain is been increasing and is at least weekly lasting up to 15 minutes.  She has occasional dyspareunia.    Patients records are available and reviewed at today's visit.    Past GYN history:  No STD history       Last PAP smear:  Normal  Last TSH:   TSH   Date Value Ref Range Status   2019 0.96 0.40 - 4.00 mU/L Final    , normal?  Yes    Past Medical History:   Diagnosis Date     Abnormal Pap smear of cervix 2011     Acne      Allergies      Anxiety state, unspecified 2005     Major depression in complete remission (H) 2012     Papanicolaou smear of cervix with low grade squamous intraepithelial lesion (LGSIL)     HPV 16 - high risk - colp negative     Unspecified asthma(493.90)      Unspecified hypothyroidism 2005       Past Surgical History:   Procedure Laterality Date     CARDIAC STRESS TST,COMPLETE      Normal      SECTION  2012    Procedure:  SECTION;   SECTION;  Surgeon: Warner Ac MD;  Location: [facility]      SECTION, TUBAL LIGATION, COMBINED N/A 2016    Procedure: COMBINED  SECTION, TUBAL LIGATION;  Surgeon: Warner Ac MD;  Location: [facility]      GYN SURGERY  11/14/2012    C section     SURGICAL HISTORY OF -   2004    root canal       Family History   Problem Relation Age of Onset     Alcohol/Drug Mother         alcoholic     Pulmonary Embolism Mother      Prostate Cancer Father 65     Alzheimer Disease Paternal Grandfather      Myocardial Infarction Paternal Grandmother        Allergies: No known allergies    Current Outpatient Medications   Medication Sig Dispense Refill     busPIRone (BUSPAR) 15 MG tablet TAKE ONE TABLET BY MOUTH TWICE A  tablet 2     levothyroxine (SYNTHROID/LEVOTHROID) 100 MCG tablet TAKE ONE TABLET BY MOUTH ONCE DAILY 90 tablet 3     propranolol (INDERAL) 10 MG tablet TAKE ONE TABLET BY MOUTH TWICE A DAY AS NEEDED FOR ANXIETY 60 tablet 0     spironolactone (ALDACTONE) 100 MG tablet 1 tab po daily 90 tablet 3     spironolactone (ALDACTONE) 50 MG tablet Take 1 tablet (50 mg) by mouth daily 90 tablet 3     tretinoin (RETIN-A) 0.025 % external cream Apply pea size amount to entire face QD 45 g 11     albuterol (PROAIR HFA/PROVENTIL HFA/VENTOLIN HFA) 108 (90 Base) MCG/ACT inhaler Inhale 2 puffs into the lungs every 6 hours as needed for shortness of breath / dyspnea or wheezing (Patient not taking: Reported on 8/1/2019) 18 g 1     doxycycline monohydrate (MONODOX) 100 MG capsule Take one tab BID with food and full glass of water (Patient not taking: Reported on 8/1/2019) 60 capsule 1       ROS:  C: NEGATIVE for fever, chills, change in weight  I: NEGATIVE for worrisome rashes, moles or lesions  E: NEGATIVE for vision changes or irritation  E/M: NEGATIVE for ear, mouth and throat problems  R: NEGATIVE for significant cough or SOB  CV: NEGATIVE for chest pain, palpitations or peripheral edema  GI: NEGATIVE for nausea, abdominal pain, heartburn, or change in bowel habits  : NEGATIVE for frequency, dysuria, hematuria, vaginal discharge  M: NEGATIVE for significant arthralgias or myalgia  N: NEGATIVE for weakness, dizziness or  "paresthesias  E: NEGATIVE for temperature intolerance, skin/hair changes  P: NEGATIVE for changes in mood or affect    EXAM:  Blood pressure 102/73, pulse 93, temperature 99  F (37.2  C), temperature source Tympanic, resp. rate 16, height 1.638 m (5' 4.5\"), weight 69.3 kg (152 lb 12.8 oz), last menstrual period 08/19/2020, not currently breastfeeding.   BMI= Body mass index is 25.82 kg/m .  General - pleasant female in no acute distress.  Neck - supple without lymphadenopathy or thyromegaly.    Abdomen - soft, nontender, nondistended, no hepatosplenomegaly.  Pelvic - EG: normal adult female,   BUS: within normal limits,   Vagina: well rugated, dark sparse discharge,   Cervix: no lesions or CMT,   Uterus: firm, normal sized and nontender,   Adnexae:Left fullness and tenderness.  Rectovaginal - deferred.  Musculoskeletal - no gross deformities.  Neurological - normal strength, sensation, and mental status.    CC:  IUD insertion  HPI:  Shirlene Rodriguez is a 40 year old female Patient's last menstrual period was 08/19/2020.  Menses are regular q 28-30 days, lasting 5 days.  Heavy No other c/o.  She is here for an IUD insertion to help with her heavy periods. Patient has verbalized understanding of risks and benefits and has signed the consent form.    Manogomous relationship? Yes    The patient's past medical history, social history and family history is reviewed at our visit and updated in EPIC.    Allergies: No known allergies    Current Outpatient Medications   Medication Sig Dispense Refill     busPIRone (BUSPAR) 15 MG tablet TAKE ONE TABLET BY MOUTH TWICE A  tablet 2     levothyroxine (SYNTHROID/LEVOTHROID) 100 MCG tablet TAKE ONE TABLET BY MOUTH ONCE DAILY 90 tablet 3     propranolol (INDERAL) 10 MG tablet TAKE ONE TABLET BY MOUTH TWICE A DAY AS NEEDED FOR ANXIETY 60 tablet 0     spironolactone (ALDACTONE) 100 MG tablet 1 tab po daily 90 tablet 3     spironolactone (ALDACTONE) 50 MG tablet Take 1 tablet " "(50 mg) by mouth daily 90 tablet 3     tretinoin (RETIN-A) 0.025 % external cream Apply pea size amount to entire face QD 45 g 11     albuterol (PROAIR HFA/PROVENTIL HFA/VENTOLIN HFA) 108 (90 Base) MCG/ACT inhaler Inhale 2 puffs into the lungs every 6 hours as needed for shortness of breath / dyspnea or wheezing (Patient not taking: Reported on 8/1/2019) 18 g 1     doxycycline monohydrate (MONODOX) 100 MG capsule Take one tab BID with food and full glass of water (Patient not taking: Reported on 8/1/2019) 60 capsule 1         ROS:  C: NEGATIVE for fever, chills, change in weight  R: NEGATIVE for significant cough or SOB  CV: NEGATIVE for chest pain, palpitations or peripheral edema  GI: NEGATIVE for nausea, abdominal pain, heartburn, or change in bowel habits  : NEGATIVE for frequency, dysuria, hematuria, vaginal discharge, or irregular bleeding      EXAM:  Blood pressure 102/73, pulse 93, temperature 99  F (37.2  C), temperature source Tympanic, resp. rate 16, height 1.638 m (5' 4.5\"), weight 69.3 kg (152 lb 12.8 oz), last menstrual period 08/19/2020, not currently breastfeeding.  General - pleasant female in no acute distress.  Pelvic - EG: normal adult female, BUS: within normal limits, Vagina: well rugated, no discharge, Cervix: no lesions or CMT, Uterus: firm, normal sized and nontender, Adnexae:Left fullness and  tenderness.    PROCEDURE:  After informed consent was obtained from the patient, a speculum was placed in the vagina to visualized the cervix.  The cervix was then swabbed with a betadine prep and 1% lidocaine paracervical block applied.  Tenaculum was placed at the 12 o'clock position on the cervix and the uterus sounded to 8.5cm.  The Mirena  IUD was then placed in the usual fashion under sterile technique.  Strings were clipped about 2 cm from the cervical os.  Tenaculum was removed and cervix was hemostatic. There were no complications. The patient tolerated the procedure well.    lot # [id number], " exp. 10/2022    NDC#:63429-650-62 (MIRENA)    ASSESSMENT/PLAN:  (Z30.430) Encounter for insertion of intrauterine contraceptive device  (primary encounter diagnosis)  Comment: for menorrhagia  Plan:     (R10.2) Pelvic pain in female  Comment: Left  Plan:         The patient should feel for the IUD strings after her next menses.  If unable to locate them, she should return to clinic for a speculum examination for confirmation that the IUD is in place. Bleeding pattern of this particular IUD was discussed with the patient. She is aware that the IUD will need to be removed in 5-6 years or PRN.  She is to return to clinic for her next annual or PRN.      Bebeto Ely MD    ASSESSMENT/PLAN:  (Z30.430) Encounter for insertion of intrauterine contraceptive device  (primary encounter diagnosis)  Comment: Menorrhagia  Plan: levonorgestrel (MIRENA) 20 MCG/24HR IUD,   (Z30.430) mirena IUD 8/25/2020        INSERTION INTRAUTERINE DEVICE            (R10.2) Pelvic pain in female  Comment: Left adnexal fullness and tenderness  Plan: US Pelvic Complete w Transvaginal                      Letter will be sent to the referring provider.    Bebeto Ely MD

## 2020-08-25 NOTE — NURSING NOTE
"Initial /73 (BP Location: Right arm, Patient Position: Chair, Cuff Size: Adult Regular)   Pulse 93   Temp 99  F (37.2  C) (Tympanic)   Resp 16   Ht 1.638 m (5' 4.5\")   Wt 69.3 kg (152 lb 12.8 oz)   LMP 08/19/2020   Breastfeeding No   BMI 25.82 kg/m   Estimated body mass index is 25.82 kg/m  as calculated from the following:    Height as of this encounter: 1.638 m (5' 4.5\").    Weight as of this encounter: 69.3 kg (152 lb 12.8 oz). .    Binta Meade, JOCELYN    "

## 2020-08-28 ENCOUNTER — HOSPITAL ENCOUNTER (OUTPATIENT)
Dept: ULTRASOUND IMAGING | Facility: CLINIC | Age: 40
Discharge: HOME | End: 2020-08-28
Attending: OBSTETRICS & GYNECOLOGY | Admitting: OBSTETRICS & GYNECOLOGY
Payer: COMMERCIAL

## 2020-08-28 DIAGNOSIS — R10.2 PELVIC PAIN IN FEMALE: ICD-10-CM

## 2020-08-28 PROCEDURE — 76856 US EXAM PELVIC COMPLETE: CPT

## 2020-09-02 NOTE — RESULT ENCOUNTER NOTE
Shirlene  As we discussed on the phone, I have contacted Dr. Cornelius to take a look at your ultrasound films.  I hope you will be able to do that the next day or so to determine what the next step should be.  We will keep you posted on what the plan is going forward.  Bebeto Ely MD

## 2020-09-03 DIAGNOSIS — R22.2 SUBCUTANEOUS NODULE OF ABDOMINAL WALL: Primary | ICD-10-CM

## 2020-09-07 ASSESSMENT — ENCOUNTER SYMPTOMS
DYSURIA: 0
HEADACHES: 0
ARTHRALGIAS: 0
FREQUENCY: 0
DIZZINESS: 0
HEMATOCHEZIA: 0
ABDOMINAL PAIN: 1
DIARRHEA: 0
PARESTHESIAS: 0
SORE THROAT: 0
FEVER: 0
SHORTNESS OF BREATH: 0
HEARTBURN: 0
WEAKNESS: 0
NAUSEA: 0
JOINT SWELLING: 0
PALPITATIONS: 0
BREAST MASS: 0
HEMATURIA: 0
MYALGIAS: 0
COUGH: 0
EYE PAIN: 0
NERVOUS/ANXIOUS: 1
CONSTIPATION: 0
CHILLS: 0

## 2020-09-11 ENCOUNTER — OFFICE VISIT (OUTPATIENT)
Dept: FAMILY MEDICINE | Facility: CLINIC | Age: 40
End: 2020-09-11
Payer: COMMERCIAL

## 2020-09-11 VITALS
HEART RATE: 90 BPM | RESPIRATION RATE: 16 BRPM | BODY MASS INDEX: 25.99 KG/M2 | TEMPERATURE: 98.2 F | DIASTOLIC BLOOD PRESSURE: 76 MMHG | SYSTOLIC BLOOD PRESSURE: 110 MMHG | WEIGHT: 153.8 LBS

## 2020-09-11 DIAGNOSIS — Z00.00 ENCOUNTER FOR ROUTINE ADULT HEALTH EXAMINATION WITHOUT ABNORMAL FINDINGS: Primary | ICD-10-CM

## 2020-09-11 DIAGNOSIS — F41.9 ANXIETY: ICD-10-CM

## 2020-09-11 DIAGNOSIS — E61.1 IRON DEFICIENCY: ICD-10-CM

## 2020-09-11 DIAGNOSIS — E03.9 ACQUIRED HYPOTHYROIDISM: ICD-10-CM

## 2020-09-11 DIAGNOSIS — L70.0 ACNE VULGARIS: ICD-10-CM

## 2020-09-11 DIAGNOSIS — R53.83 FATIGUE, UNSPECIFIED TYPE: ICD-10-CM

## 2020-09-11 DIAGNOSIS — Z23 NEED FOR PROPHYLACTIC VACCINATION AND INOCULATION AGAINST INFLUENZA: ICD-10-CM

## 2020-09-11 LAB
ALBUMIN SERPL-MCNC: 4.3 G/DL (ref 3.4–5)
ALP SERPL-CCNC: 72 U/L (ref 40–150)
ALT SERPL W P-5'-P-CCNC: 38 U/L (ref 0–50)
ANION GAP SERPL CALCULATED.3IONS-SCNC: 5 MMOL/L (ref 3–14)
AST SERPL W P-5'-P-CCNC: 23 U/L (ref 0–45)
BASOPHILS # BLD AUTO: 0 10E9/L (ref 0–0.2)
BASOPHILS NFR BLD AUTO: 0.1 %
BILIRUB SERPL-MCNC: 0.7 MG/DL (ref 0.2–1.3)
BUN SERPL-MCNC: 10 MG/DL (ref 7–30)
CALCIUM SERPL-MCNC: 9.2 MG/DL (ref 8.5–10.1)
CHLORIDE SERPL-SCNC: 102 MMOL/L (ref 94–109)
CO2 SERPL-SCNC: 28 MMOL/L (ref 20–32)
CREAT SERPL-MCNC: 0.92 MG/DL (ref 0.52–1.04)
DIFFERENTIAL METHOD BLD: ABNORMAL
EOSINOPHIL # BLD AUTO: 0.1 10E9/L (ref 0–0.7)
EOSINOPHIL NFR BLD AUTO: 1.1 %
ERYTHROCYTE [DISTWIDTH] IN BLOOD BY AUTOMATED COUNT: 13.2 % (ref 10–15)
ERYTHROCYTE [SEDIMENTATION RATE] IN BLOOD BY WESTERGREN METHOD: 6 MM/H (ref 0–20)
FERRITIN SERPL-MCNC: 22 NG/ML (ref 12–150)
GFR SERPL CREATININE-BSD FRML MDRD: 78 ML/MIN/{1.73_M2}
GLUCOSE SERPL-MCNC: 84 MG/DL (ref 70–99)
HCT VFR BLD AUTO: 47 % (ref 35–47)
HGB BLD-MCNC: 15.9 G/DL (ref 11.7–15.7)
LYMPHOCYTES # BLD AUTO: 2.1 10E9/L (ref 0.8–5.3)
LYMPHOCYTES NFR BLD AUTO: 27.9 %
MCH RBC QN AUTO: 29.4 PG (ref 26.5–33)
MCHC RBC AUTO-ENTMCNC: 33.8 G/DL (ref 31.5–36.5)
MCV RBC AUTO: 87 FL (ref 78–100)
MONOCYTES # BLD AUTO: 0.5 10E9/L (ref 0–1.3)
MONOCYTES NFR BLD AUTO: 7 %
NEUTROPHILS # BLD AUTO: 4.8 10E9/L (ref 1.6–8.3)
NEUTROPHILS NFR BLD AUTO: 63.9 %
PLATELET # BLD AUTO: 279 10E9/L (ref 150–450)
POTASSIUM SERPL-SCNC: 4.5 MMOL/L (ref 3.4–5.3)
PROT SERPL-MCNC: 8.3 G/DL (ref 6.8–8.8)
RBC # BLD AUTO: 5.4 10E12/L (ref 3.8–5.2)
SODIUM SERPL-SCNC: 135 MMOL/L (ref 133–144)
T4 FREE SERPL-MCNC: 1.3 NG/DL (ref 0.76–1.46)
TSH SERPL DL<=0.005 MIU/L-ACNC: 0.39 MU/L (ref 0.4–4)
WBC # BLD AUTO: 7.5 10E9/L (ref 4–11)

## 2020-09-11 PROCEDURE — 90686 IIV4 VACC NO PRSV 0.5 ML IM: CPT | Performed by: PHYSICIAN ASSISTANT

## 2020-09-11 PROCEDURE — 36415 COLL VENOUS BLD VENIPUNCTURE: CPT | Performed by: PHYSICIAN ASSISTANT

## 2020-09-11 PROCEDURE — 90471 IMMUNIZATION ADMIN: CPT | Performed by: PHYSICIAN ASSISTANT

## 2020-09-11 PROCEDURE — 99395 PREV VISIT EST AGE 18-39: CPT | Mod: 25 | Performed by: PHYSICIAN ASSISTANT

## 2020-09-11 PROCEDURE — 82728 ASSAY OF FERRITIN: CPT | Performed by: PHYSICIAN ASSISTANT

## 2020-09-11 PROCEDURE — 82306 VITAMIN D 25 HYDROXY: CPT | Performed by: PHYSICIAN ASSISTANT

## 2020-09-11 PROCEDURE — 84439 ASSAY OF FREE THYROXINE: CPT | Performed by: PHYSICIAN ASSISTANT

## 2020-09-11 PROCEDURE — 80050 GENERAL HEALTH PANEL: CPT | Performed by: PHYSICIAN ASSISTANT

## 2020-09-11 PROCEDURE — 85652 RBC SED RATE AUTOMATED: CPT | Performed by: PHYSICIAN ASSISTANT

## 2020-09-11 RX ORDER — PROPRANOLOL HYDROCHLORIDE 20 MG/1
20 TABLET ORAL 2 TIMES DAILY PRN
Qty: 30 TABLET | Refills: 1 | Status: SHIPPED | OUTPATIENT
Start: 2020-09-11 | End: 2022-05-18

## 2020-09-11 RX ORDER — SPIRONOLACTONE 100 MG/1
TABLET, FILM COATED ORAL
Qty: 90 TABLET | Refills: 0 | Status: SHIPPED | OUTPATIENT
Start: 2020-09-11 | End: 2020-09-21

## 2020-09-11 RX ORDER — BUSPIRONE HYDROCHLORIDE 10 MG/1
10 TABLET ORAL 3 TIMES DAILY
Qty: 90 TABLET | Refills: 1 | Status: SHIPPED | OUTPATIENT
Start: 2020-09-11 | End: 2020-11-09

## 2020-09-11 RX ORDER — SPIRONOLACTONE 50 MG/1
50 TABLET, FILM COATED ORAL DAILY
Qty: 90 TABLET | Refills: 0 | Status: SHIPPED | OUTPATIENT
Start: 2020-09-11 | End: 2020-09-21

## 2020-09-11 ASSESSMENT — PAIN SCALES - GENERAL: PAINLEVEL: NO PAIN (0)

## 2020-09-11 NOTE — TELEPHONE ENCOUNTER
Medication is being filled for 1 time refill only due to:  Patient needs to be seen because it has been more than one year since last visit. Pt has follow up appt 9/2020.  Liliana CROWELL RN BSN PHN  Specialty Clinics

## 2020-09-11 NOTE — PATIENT INSTRUCTIONS
Check in with me next month - virtual or mychart  Change buspirone to 10 mg three times daily  Change propranolol to 20 mg as needed for anxiety      Preventive Health Recommendations  Female Ages 26 - 39  Yearly exam:   See your health care provider every year in order to    Review health changes.     Discuss preventive care.      Review your medicines if you your doctor has prescribed any.    Until age 30: Get a Pap test every three years (more often if you have had an abnormal result).    After age 30: Talk to your doctor about whether you should have a Pap test every 3 years or have a Pap test with HPV screening every 5 years.   You do not need a Pap test if your uterus was removed (hysterectomy) and you have not had cancer.  You should be tested each year for STDs (sexually transmitted diseases), if you're at risk.   Talk to your provider about how often to have your cholesterol checked.  If you are at risk for diabetes, you should have a diabetes test (fasting glucose).  Shots: Get a flu shot each year. Get a tetanus shot every 10 years.   Nutrition:     Eat at least 5 servings of fruits and vegetables each day.    Eat whole-grain bread, whole-wheat pasta and brown rice instead of white grains and rice.    Get adequate Calcium and Vitamin D.     Lifestyle    Exercise at least 150 minutes a week (30 minutes a day, 5 days of the week). This will help you control your weight and prevent disease.    Limit alcohol to one drink per day.    No smoking.     Wear sunscreen to prevent skin cancer.    See your dentist every six months for an exam and cleaning.

## 2020-09-11 NOTE — PROGRESS NOTES
"   SUBJECTIVE:   CC: Shirlene Rodriguez is an 40 year old woman who presents for preventive health visit.     Annual Exam:  Frequency of exercise:: 2-3 days/week  Getting at least 3 servings of Calcium per day:: Yes  Diet:: Regular (no restrictions)  Taking medications regularly:: Yes  Medication side effects:: None  Bi-annual eye exam:: Yes  Dental care twice a year:: Yes  Sleep apnea or symptoms of sleep apnea:: Daytime drowsiness  abdominal pain: Yes  Blood in stool: No  Blood in urine: No  chest pain: No  chills: No  congestion: No  constipation: No  cough: No  diarrhea: No  dizziness: No  ear pain: No  eye pain: No  nervous/anxious: Yes  fever: No  frequency: No  genital sores: No  headaches: No  hearing loss: No  heartburn: No  arthralgias: No  joint swelling: No  peripheral edema: No  mood changes: No  myalgias: No  nausea: No  dysuria: No  palpitations: No  Skin sensation changes: No  sore throat: No  urgency: No  rash: No  shortness of breath: No  visual disturbance: No  weakness: No  pelvic pain: Yes  vaginal bleeding: No  vaginal discharge: No  tenderness: No  breast mass: No  breast discharge: No  Additional concerns today:: Yes  Duration of exercise:: 15-30 minutes    Patient informed that anything we discuss that is not related to preventative medicine, may be billed for; patient verbalizes understanding.      Anxiety: high. She is working from home, kids starting hybrid schooling. Mom is \"actively dying\" of lung cancer but not wanting to see her or her sister.  Fatigue: 8-9 hours of sleep, sleeping all right, no snoring  Has been on celexa, zoloft, prozac in the past - worries about weight gain, was more tired on the medications    Not taking multi/iron    Pelvic pain:   IUD placed August  Nodule anterior abdominal wall LLQ. Will see general surgery Dr. Cornelius 9/16/20  This really worries her that something is wrong    Asthma: well controlled.    Thyroid: Since last visit, patient describes the following " symptoms: Weight stable, + hair loss, no skin changes, no constipation, no loose stools  TSH  Has been on lower end, adjust as needed to stay close to TSH of 1        Today's PHQ-2 Score:   PHQ-2 (  [employer]) 2020   Q1: Little interest or pleasure in doing things 0 0   Q2: Feeling down, depressed or hopeless 0 0   PHQ-2 Score 0 0   Q1: Little interest or pleasure in doing things Not at all Not at all   Q2: Feeling down, depressed or hopeless Not at all Not at all   PHQ-2 Score 0 0     Abuse: Current or Past(Physical, Sexual or Emotional)- No  Do you feel safe in your environment? Yes        Social History     Tobacco Use     Smoking status: Never Smoker     Smokeless tobacco: Never Used   Substance Use Topics     Alcohol use: Yes     Comment: rare     If you drink alcohol do you typically have >3 drinks per day or >7 drinks per week? No                     Reviewed orders with patient.  Reviewed health maintenance and updated orders accordingly - Yes  Lab work is in process  Labs reviewed in EPIC  BP Readings from Last 3 Encounters:   20 93/71   20 110/76   20 102/73    Wt Readings from Last 3 Encounters:   20 69.8 kg (153 lb 14.1 oz)   20 69.8 kg (153 lb 12.8 oz)   20 69.3 kg (152 lb 12.8 oz)                  Patient Active Problem List   Diagnosis     GERD (gastroesophageal reflux disease)     History of      Anxiety     Acne     Acquired hypothyroidism     mirena IUD 2020     Past Surgical History:   Procedure Laterality Date     CARDIAC STRESS TST,COMPLETE      Normal      SECTION  2012    Procedure:  SECTION;   SECTION;  Surgeon: Warner Ac MD;  [facility]      SECTION, TUBAL LIGATION, COMBINED N/A 2016    Procedure: COMBINED  SECTION, TUBAL LIGATION;  Surgeon: Warner Ac MD;  Location: [facility]     GYN SURGERY  2012    C section     SURGICAL HISTORY OF -        root canal       Social History     Tobacco Use     Smoking status: Never Smoker     Smokeless tobacco: Never Used   Substance Use Topics     Alcohol use: Yes     Comment: rare     Family History   Problem Relation Age of Onset     Alcohol/Drug Mother         alcoholic     Pulmonary Embolism Mother      Prostate Cancer Father 65     Alzheimer Disease Paternal Grandfather      Myocardial Infarction Paternal Grandmother            Mammogram not appropriate for this patient based on age.    Pertinent mammograms are reviewed under the imaging tab.  History of abnormal Pap smear: NO - age 30-65 PAP every 5 years with negative HPV co-testing recommended  PAP / HPV Latest Ref Rng & Units 2018   PAP - NIL NIL NIL   HPV 16 DNA NEG:Negative Negative - -   HPV 18 DNA NEG:Negative Negative - -   OTHER HR HPV NEG:Negative Negative - -     Reviewed and updated as needed this visit by clinical staff  Tobacco  Allergies  Meds  Problems  Med Hx  Surg Hx  Fam Hx  Soc Hx          Reviewed and updated as needed this visit by Provider  Tobacco  Allergies  Meds  Problems  Med Hx  Surg Hx  Fam Hx        Past Medical History:   Diagnosis Date     Abnormal Pap smear of cervix 2011     Acne      Allergies      Anxiety state, unspecified 2005     Major depression in complete remission (H) 2012     Papanicolaou smear of cervix with low grade squamous intraepithelial lesion (LGSIL)     HPV 16 - high risk - colp negative     Unspecified asthma(493.90)      Unspecified hypothyroidism 2005      Past Surgical History:   Procedure Laterality Date     CARDIAC STRESS TST,COMPLETE      Normal      SECTION  2012    Procedure:  SECTION;   SECTION;  Surgeon: Warner Ac MD;  Location:  [facility]      SECTION, TUBAL LIGATION, COMBINED N/A 2016    Procedure: COMBINED  SECTION, TUBAL LIGATION;  Surgeon: Warner Ac MD;  Location:  [facility]     GYN SURGERY  11/14/2012    C section     SURGICAL HISTORY OF -   2004    root canal       ROS:  CONSTITUTIONAL: NEGATIVE for fever, chills, change in weight  INTEGUMENTARU/SKIN: NEGATIVE for worrisome rashes, moles or lesions  EYES: NEGATIVE for vision changes or irritation  ENT: NEGATIVE for ear, mouth and throat problems  RESP: NEGATIVE for significant cough or SOB  BREAST: NEGATIVE for masses, tenderness or discharge  CV: NEGATIVE for chest pain, palpitations or peripheral edema  GI: NEGATIVE for nausea, abdominal pain, heartburn, or change in bowel habits  : NEGATIVE for unusual urinary or vaginal symptoms. Periods are regular.  MUSCULOSKELETAL: NEGATIVE for significant arthralgias or myalgia  NEURO: NEGATIVE for weakness, dizziness or paresthesias  PSYCHIATRIC: NEGATIVE for changes in mood or affect    OBJECTIVE:   /76   Pulse 90   Temp 98.2  F (36.8  C) (Tympanic)   Resp 16   Wt 69.8 kg (153 lb 12.8 oz)   LMP 08/19/2020   BMI 25.99 kg/m    EXAM:  GENERAL: healthy, alert and no distress  EYES: Eyes grossly normal to inspection, PERRL and conjunctivae and sclerae normal  HENT: ear canals and TM's normal, nose and mouth without ulcers or lesions  NECK: no adenopathy, no asymmetry, masses, or scars and thyroid normal to palpation  RESP: lungs clear to auscultation - no rales, rhonchi or wheezes  BREAST: normal without masses, tenderness or nipple discharge and no palpable axillary masses or adenopathy  CV: regular rate and rhythm, normal S1 S2, no S3 or S4, no murmur, click or rub, no peripheral edema and peripheral pulses strong  ABDOMEN: soft, nontender, no hepatosplenomegaly, no masses and bowel sounds normal   (female): deferred  MS: no gross musculoskeletal defects noted, no edema  SKIN: no suspicious lesions or rashes  NEURO: Normal strength and tone, mentation intact and speech normal  BACK: no CVA tenderness, no paralumbar tenderness  PSYCH: mentation appears normal, affect  "normal/bright  LYMPH: no cervical, supraclavicular, axillary, or inguinal adenopathy    Diagnostic Test Results:  Labs reviewed in Epic    ASSESSMENT/PLAN:     ASSESSMENT/PLAN:      ICD-10-CM    1. Encounter for routine adult health examination without abnormal findings  Z00.00    2. Acquired hypothyroidism  E03.9 TSH WITH FREE T4 REFLEX     T4 free     TSH with free T4 reflex   3. Fatigue, unspecified type  R53.83 TSH WITH FREE T4 REFLEX     CBC with platelets and differential     Ferritin     Comprehensive metabolic panel     Vitamin D Deficiency     ESR: Erythrocyte sedimentation rate   4. Anxiety  F41.9 busPIRone (BUSPAR) 10 MG tablet     propranolol (INDERAL) 20 MG tablet   5. Need for prophylactic vaccination and inoculation against influenza  Z23 INFLUENZA VACCINE IM > 6 MONTHS VALENT IIV4 [04516]     Vaccine Administration, Initial [04496]   6. Iron deficiency  E61.1 Ferritin     CBC with platelets and differential     Fatigue could be related to anxiety, will check labs today as well.  Will adjust anxiety medications. Consider changing meds further as needed.  Follow up in 1 month for check in    Patient Instructions   Check in with me next month - virtual or mychart  Change buspirone to 10 mg three times daily  Change propranolol to 20 mg as needed for anxiety    COUNSELING:   Reviewed preventive health counseling, as reflected in patient instructions       Regular exercise       Healthy diet/nutrition    Estimated body mass index is 25.99 kg/m  as calculated from the following:    Height as of 8/25/20: 1.638 m (5' 4.5\").    Weight as of this encounter: 69.8 kg (153 lb 12.8 oz).    Weight management plan: Discussed healthy diet and exercise guidelines    She reports that she has never smoked. She has never used smokeless tobacco.      Counseling Resources:  ATP IV Guidelines  Pooled Cohorts Equation Calculator  Breast Cancer Risk Calculator  BRCA-Related Cancer Risk Assessment: FHS-7 Tool  FRAX Risk " Assessment  ICSI Preventive Guidelines  Dietary Guidelines for Americans, 2010  USDA's MyPlate  ASA Prophylaxis  Lung CA Screening    Gwen Zambrano PA-C  [facility]

## 2020-09-11 NOTE — TELEPHONE ENCOUNTER
"Requested Prescriptions   Pending Prescriptions Disp Refills     spironolactone (ALDACTONE) 100 MG tablet 90 tablet 3     Si tab po daily       Diuretics (Including Combos) Protocol Failed - 2020  8:59 AM        Failed - Normal serum creatinine on file in past 12 months     Recent Labs   Lab Test 19  1809   CR 0.83              Failed - Normal serum potassium on file in past 12 months     Recent Labs   Lab Test 19  1809   POTASSIUM 4.2                    Failed - Normal serum sodium on file in past 12 months     Recent Labs   Lab Test 19  1809                 Passed - Blood pressure under 140/90 in past 12 months     BP Readings from Last 3 Encounters:   20 102/73   19 107/68   19 101/68                 Passed - Recent (12 mo) or future (30 days) visit within the authorizing provider's specialty     Patient has had an office visit with the authorizing provider or a provider within the authorizing providers department within the previous 12 mos or has a future within next 30 days. See \"Patient Info\" tab in inbasket, or \"Choose Columns\" in Meds & Orders section of the refill encounter.              Passed - Medication is active on med list        Passed - Patient is age 18 or older        Passed - No active pregancy on record        Passed - No positive pregnancy test in past 12 months           spironolactone (ALDACTONE) 50 MG tablet 90 tablet 3     Sig: Take 1 tablet (50 mg) by mouth daily       There is no refill protocol information for this order          Last Written Prescription Date:    Last Fill Quantity: ,  # refills:    Last office visit: 2019 with prescribing provider:  Sasha Nicole Office Visit:   Next 5 appointments (look out 90 days)    Sep 11, 2020 10:40 AM CDT  PHYSICAL with Gwen Zambrano PA-C  [facility] ([facility] [address] [address]  [phone]   Sep 21, 2020 12:30 PM CDT  Return Visit with " Sasha Hughes PA-C  [facility] ([facility]) [address] [address] [phone]

## 2020-09-14 LAB — DEPRECATED CALCIDIOL+CALCIFEROL SERPL-MC: 36 UG/L (ref 20–75)

## 2020-09-16 ENCOUNTER — OFFICE VISIT (OUTPATIENT)
Dept: SURGERY | Facility: CLINIC | Age: 40
End: 2020-09-16
Attending: OBSTETRICS & GYNECOLOGY
Payer: COMMERCIAL

## 2020-09-16 VITALS
HEART RATE: 77 BPM | DIASTOLIC BLOOD PRESSURE: 71 MMHG | TEMPERATURE: 98.4 F | HEIGHT: 65 IN | WEIGHT: 153.88 LBS | SYSTOLIC BLOOD PRESSURE: 93 MMHG | BODY MASS INDEX: 25.64 KG/M2

## 2020-09-16 DIAGNOSIS — R22.2 SUBCUTANEOUS NODULE OF ABDOMINAL WALL: ICD-10-CM

## 2020-09-16 PROCEDURE — 99203 OFFICE O/P NEW LOW 30 MIN: CPT | Performed by: SURGERY

## 2020-09-16 ASSESSMENT — MIFFLIN-ST. JEOR: SCORE: 1365.94

## 2020-09-16 NOTE — NURSING NOTE
"Initial BP 93/71 (BP Location: Right arm, Patient Position: Sitting, Cuff Size: Adult Regular)   Pulse 77   Temp 98.4  F (36.9  C) (Tympanic)   Ht 1.638 m (5' 4.5\")   Wt 69.8 kg (153 lb 14.1 oz)   LMP 08/19/2020   BMI 26.01 kg/m   Estimated body mass index is 26.01 kg/m  as calculated from the following:    Height as of this encounter: 1.638 m (5' 4.5\").    Weight as of this encounter: 69.8 kg (153 lb 14.1 oz). .    Kristin King MA    "

## 2020-09-16 NOTE — PROGRESS NOTES
40-year-old female complaint of left lower abdominal pain for the past several months.  Patient reports the pain as 1-2 out of 10.  Is considered sharp.  Pain can come and go.  Not associated with eating or bowel movements.  Patient had a tubal ligation several years ago.  She has been worked up by the OB/GYN service and aside from few ovarian cysts I cannot find any abnormalities.  She has had 2 C-sections.    Patient Active Problem List   Diagnosis     GERD (gastroesophageal reflux disease)     History of      Anxiety     Acne     Acquired hypothyroidism     mirena IUD 2020       Past Medical History:   Diagnosis Date     Abnormal Pap smear of cervix 2011     Acne      Allergies      Anxiety state, unspecified 2005     Major depression in complete remission (H) 2012     Papanicolaou smear of cervix with low grade squamous intraepithelial lesion (LGSIL)     HPV 16 - high risk - colp negative     Unspecified asthma(493.90)      Unspecified hypothyroidism 2005       Past Surgical History:   Procedure Laterality Date     CARDIAC STRESS TST,COMPLETE      Normal      SECTION  2012    Procedure:  SECTION;   SECTION;  Surgeon: Warner Ac MD;  Location:  [facility]      SECTION, TUBAL LIGATION, COMBINED N/A 2016    Procedure: COMBINED  SECTION, TUBAL LIGATION;  Surgeon: Warner Ac MD;  Location:  [facility]     GYN SURGERY  2012    C section     SURGICAL HISTORY OF -       root canal       Family History   Problem Relation Age of Onset     Alcohol/Drug Mother         alcoholic     Pulmonary Embolism Mother      Prostate Cancer Father 65     Alzheimer Disease Paternal Grandfather      Myocardial Infarction Paternal Grandmother        Social History     Tobacco Use     Smoking status: Never Smoker     Smokeless tobacco: Never Used   Substance Use Topics     Alcohol use: Yes     Comment: rare        History   Drug  "Use No       Current Outpatient Medications   Medication Sig Dispense Refill     albuterol (PROAIR HFA/PROVENTIL HFA/VENTOLIN HFA) 108 (90 Base) MCG/ACT inhaler Inhale 2 puffs into the lungs every 6 hours as needed for shortness of breath / dyspnea or wheezing (Patient not taking: Reported on 8/1/2019) 18 g 1     busPIRone (BUSPAR) 10 MG tablet Take 1 tablet (10 mg) by mouth 3 times daily 90 tablet 1     doxycycline monohydrate (MONODOX) 100 MG capsule Take one tab BID with food and full glass of water 60 capsule 1     levonorgestrel (MIRENA) 20 MCG/24HR IUD 1 each (20 mcg) by Intrauterine route once       levothyroxine (SYNTHROID/LEVOTHROID) 100 MCG tablet TAKE ONE TABLET BY MOUTH ONCE DAILY 90 tablet 3     propranolol (INDERAL) 20 MG tablet Take 1 tablet (20 mg) by mouth 2 times daily as needed (anxiety) 30 tablet 1     spironolactone (ALDACTONE) 100 MG tablet 1 tab po daily 90 tablet 0     spironolactone (ALDACTONE) 50 MG tablet Take 1 tablet (50 mg) by mouth daily 90 tablet 0     tretinoin (RETIN-A) 0.025 % external cream Apply pea size amount to entire face QD 45 g 11       Allergies   Allergen Reactions     No Known Allergies        ROS  Constitutional - Denies fevers, weight loss, malaise, lethargy  Neuro - Denies tremors or seizures  Pulmon - Denies SOB, dyspnea, hemoptysis, chronic cough or use of an inhaler  CV - Denies CP, SOB, lower extremity edema, difficulty w/ stairs, has never used NTG  GI - Denies hematemesis, BRBPR, melena, chronic diarrhea or epigastric pain   - Denies hematuria, difficulty voiding, h/o STDs  Hematology - Denies blood clotting disorders, chronic anemias  Dermatology - No melanomas or skin cancers  Rheumatology - No h/o RA  Pysch - Denies depression, bipolar d/o or schizophrenia    Exam:BP 93/71 (BP Location: Right arm, Patient Position: Sitting, Cuff Size: Adult Regular)   Pulse 77   Temp 98.4  F (36.9  C) (Tympanic)   Ht 1.638 m (5' 4.5\")   Wt 69.8 kg (153 lb 14.1 oz)   " LMP 2020   BMI 26.01 kg/m      General - Alert and Oriented X4, NAD, well nourished  Abdomen - Soft, non-tender, +BS, no hepatosplenomegaly, no appreciable masses over patient's area of complaint.  Neuro - Full ROM, Strength 5/5 and major muscle groups, sensation intact  Extremities - No cyanosis, clubbing or edema    Assessment and plan: 40-year-old female with left lower abdominal wall pain.  Certainly the ultrasound shows a small nodule in this area although I cannot appreciate it clinically.  We will go ahead and order a CT scan of her abdomen and pelvis to evaluate the abdominal wall.  This could be scar tissue from her previous  or even possibly a small hernia.  Patient will get the CT scan and return to clinic.    Dipak Cornelius MD

## 2020-09-16 NOTE — LETTER
2020         RE: Shirlene Rodriguez  [address] [place] [address]        Dear Colleague,    Thank you for referring your patient, Shirlene Rodriguez, to the [facility]. Please see a copy of my visit note below.    40-year-old female complaint of left lower abdominal pain for the past several months.  Patient reports the pain as 1-2 out of 10.  Is considered sharp.  Pain can come and go.  Not associated with eating or bowel movements.  Patient had a tubal ligation several years ago.  She has been worked up by the OB/GYN service and aside from few ovarian cysts I cannot find any abnormalities.  She has had 2 C-sections.    Patient Active Problem List   Diagnosis     GERD (gastroesophageal reflux disease)     History of      Anxiety     Acne     Acquired hypothyroidism     mirena IUD 2020       Past Medical History:   Diagnosis Date     Abnormal Pap smear of cervix 2011     Acne      Allergies      Anxiety state, unspecified 2005     Major depression in complete remission (H) 2012     Papanicolaou smear of cervix with low grade squamous intraepithelial lesion (LGSIL)     HPV 16 - high risk - colp negative     Unspecified asthma(493.90)      Unspecified hypothyroidism 2005       Past Surgical History:   Procedure Laterality Date     CARDIAC STRESS TST,COMPLETE      Normal      SECTION  2012    Procedure:  SECTION;   SECTION;  Surgeon: Warner Ac MD;  [facility]: [facility]      SECTION, TUBAL LIGATION, COMBINED N/A 2016    Procedure: COMBINED  SECTION, TUBAL LIGATION;  Surgeon: Warner Ac MD;  Location: [facility]     GYN SURGERY  2012    C section     SURGICAL HISTORY OF -       root canal       Family History   Problem Relation Age of Onset     Alcohol/Drug Mother         alcoholic     Pulmonary Embolism Mother      Prostate Cancer Father 65     Alzheimer Disease Paternal  Grandfather      Myocardial Infarction Paternal Grandmother        Social History     Tobacco Use     Smoking status: Never Smoker     Smokeless tobacco: Never Used   Substance Use Topics     Alcohol use: Yes     Comment: rare        History   Drug Use No       Current Outpatient Medications   Medication Sig Dispense Refill     albuterol (PROAIR HFA/PROVENTIL HFA/VENTOLIN HFA) 108 (90 Base) MCG/ACT inhaler Inhale 2 puffs into the lungs every 6 hours as needed for shortness of breath / dyspnea or wheezing (Patient not taking: Reported on 8/1/2019) 18 g 1     busPIRone (BUSPAR) 10 MG tablet Take 1 tablet (10 mg) by mouth 3 times daily 90 tablet 1     doxycycline monohydrate (MONODOX) 100 MG capsule Take one tab BID with food and full glass of water 60 capsule 1     levonorgestrel (MIRENA) 20 MCG/24HR IUD 1 each (20 mcg) by Intrauterine route once       levothyroxine (SYNTHROID/LEVOTHROID) 100 MCG tablet TAKE ONE TABLET BY MOUTH ONCE DAILY 90 tablet 3     propranolol (INDERAL) 20 MG tablet Take 1 tablet (20 mg) by mouth 2 times daily as needed (anxiety) 30 tablet 1     spironolactone (ALDACTONE) 100 MG tablet 1 tab po daily 90 tablet 0     spironolactone (ALDACTONE) 50 MG tablet Take 1 tablet (50 mg) by mouth daily 90 tablet 0     tretinoin (RETIN-A) 0.025 % external cream Apply pea size amount to entire face QD 45 g 11       Allergies   Allergen Reactions     No Known Allergies        ROS  Constitutional - Denies fevers, weight loss, malaise, lethargy  Neuro - Denies tremors or seizures  Pulmon - Denies SOB, dyspnea, hemoptysis, chronic cough or use of an inhaler  CV - Denies CP, SOB, lower extremity edema, difficulty w/ stairs, has never used NTG  GI - Denies hematemesis, BRBPR, melena, chronic diarrhea or epigastric pain   - Denies hematuria, difficulty voiding, h/o STDs  Hematology - Denies blood clotting disorders, chronic anemias  Dermatology - No melanomas or skin cancers  Rheumatology - No h/o RA  Pysch -  "Denies depression, bipolar d/o or schizophrenia    Exam:BP 93/71 (BP Location: Right arm, Patient Position: Sitting, Cuff Size: Adult Regular)   Pulse 77   Temp 98.4  F (36.9  C) (Tympanic)   Ht 1.638 m (5' 4.5\")   Wt 69.8 kg (153 lb 14.1 oz)   LMP 2020   BMI 26.01 kg/m      General - Alert and Oriented X4, NAD, well nourished  Abdomen - Soft, non-tender, +BS, no hepatosplenomegaly, no appreciable masses over patient's area of complaint.  Neuro - Full ROM, Strength 5/5 and major muscle groups, sensation intact  Extremities - No cyanosis, clubbing or edema    Assessment and plan: 40-year-old female with left lower abdominal wall pain.  Certainly the ultrasound shows a small nodule in this area although I cannot appreciate it clinically.  We will go ahead and order a CT scan of her abdomen and pelvis to evaluate the abdominal wall.  This could be scar tissue from her previous  or even possibly a small hernia.  Patient will get the CT scan and return to clinic.    Dipak Cornelius MD     Again, thank you for allowing me to participate in the care of your patient.        Sincerely,        Dipak Cornelius MD    "

## 2020-09-17 ENCOUNTER — HOSPITAL ENCOUNTER (OUTPATIENT)
Dept: CT IMAGING | Facility: CLINIC | Age: 40
Discharge: HOME | End: 2020-09-17
Attending: SURGERY | Admitting: SURGERY
Payer: COMMERCIAL

## 2020-09-17 DIAGNOSIS — R22.2 SUBCUTANEOUS NODULE OF ABDOMINAL WALL: ICD-10-CM

## 2020-09-17 PROCEDURE — 25000125 ZZHC RX 250: Performed by: RADIOLOGY

## 2020-09-17 PROCEDURE — 74177 CT ABD & PELVIS W/CONTRAST: CPT

## 2020-09-17 PROCEDURE — 25000128 H RX IP 250 OP 636: Performed by: RADIOLOGY

## 2020-09-17 RX ORDER — IOPAMIDOL 755 MG/ML
75 INJECTION, SOLUTION INTRAVASCULAR ONCE
Status: COMPLETED | OUTPATIENT
Start: 2020-09-17 | End: 2020-09-17

## 2020-09-17 RX ORDER — IOPAMIDOL 755 MG/ML
75 INJECTION, SOLUTION INTRAVASCULAR ONCE
Status: DISCONTINUED | OUTPATIENT
Start: 2020-09-17 | End: 2020-09-17 | Stop reason: CLARIF

## 2020-09-17 RX ADMIN — SODIUM CHLORIDE 59 ML: 9 INJECTION, SOLUTION INTRAVENOUS at 14:23

## 2020-09-17 RX ADMIN — IOPAMIDOL 75 ML: 755 INJECTION, SOLUTION INTRAVENOUS at 14:23

## 2020-09-21 ENCOUNTER — OFFICE VISIT (OUTPATIENT)
Dept: DERMATOLOGY | Facility: CLINIC | Age: 40
End: 2020-09-21
Payer: COMMERCIAL

## 2020-09-21 VITALS
TEMPERATURE: 99 F | RESPIRATION RATE: 20 BRPM | SYSTOLIC BLOOD PRESSURE: 116 MMHG | HEART RATE: 83 BPM | DIASTOLIC BLOOD PRESSURE: 78 MMHG

## 2020-09-21 DIAGNOSIS — D18.01 ANGIOMA OF SKIN: ICD-10-CM

## 2020-09-21 DIAGNOSIS — L71.9 ROSACEA: Primary | ICD-10-CM

## 2020-09-21 DIAGNOSIS — L70.0 ACNE VULGARIS: ICD-10-CM

## 2020-09-21 DIAGNOSIS — L81.4 LENTIGO: ICD-10-CM

## 2020-09-21 DIAGNOSIS — D22.9 NEVUS: ICD-10-CM

## 2020-09-21 DIAGNOSIS — L82.1 SEBORRHEIC KERATOSES: ICD-10-CM

## 2020-09-21 PROCEDURE — 99214 OFFICE O/P EST MOD 30 MIN: CPT | Performed by: PHYSICIAN ASSISTANT

## 2020-09-21 RX ORDER — SPIRONOLACTONE 50 MG/1
50 TABLET, FILM COATED ORAL DAILY
Qty: 90 TABLET | Refills: 3 | Status: SHIPPED | OUTPATIENT
Start: 2020-09-21 | End: 2021-11-23

## 2020-09-21 RX ORDER — OXYMETAZOLINE HYDROCHLORIDE 1 G/100G
CREAM TOPICAL
Qty: 30 G | Refills: 11 | Status: SHIPPED | OUTPATIENT
Start: 2020-09-21 | End: 2023-05-15

## 2020-09-21 RX ORDER — TRETINOIN 0.25 MG/G
CREAM TOPICAL
Qty: 45 G | Refills: 11 | Status: SHIPPED | OUTPATIENT
Start: 2020-09-21 | End: 2021-10-08

## 2020-09-21 RX ORDER — DOXYCYCLINE 100 MG/1
CAPSULE ORAL
Qty: 60 CAPSULE | Refills: 1 | Status: SHIPPED | OUTPATIENT
Start: 2020-09-21 | End: 2020-10-01

## 2020-09-21 RX ORDER — SPIRONOLACTONE 100 MG/1
TABLET, FILM COATED ORAL
Qty: 90 TABLET | Refills: 3 | Status: SHIPPED | OUTPATIENT
Start: 2020-09-21 | End: 2021-11-23

## 2020-09-21 NOTE — PROGRESS NOTES
Chief Complaint   Patient presents with     Skin Check       Vitals:    09/21/20 1231   BP: 116/78   BP Location: Right arm   Patient Position: Sitting   Cuff Size: Adult Regular   Pulse: 83   Resp: 20   Temp: 99  F (37.2  C)   TempSrc: Tympanic     Wt Readings from Last 1 Encounters:   09/16/20 69.8 kg (153 lb 14.1 oz)       Jovanna WHYTE RN   Specialty Clinics

## 2020-09-21 NOTE — PROGRESS NOTES
HPI:   Shirlene Rodriguez is a 40 year old female who presents for Full skin cancer screening and for recheck of acne on the face. Overall acne has been doing well, but she continues to flush and have background redness.   Last Skin Exam: 2 years ago      1st Baseline: No  Personal HX of Skin Cancer: no   Personal HX of Malignant Melanoma: no   Family HX of Skin Cancer / Malignant Melanoma: no  Personal HX of Atypical Moles:  Yes moderately atypical nevus 2018  Risk factors: regular sun exposure  New / Changing lesions: no  Social History: works at [employer]  On review of systems, there are no further skin complaints, patient is feeling otherwise well.  See patient intake sheet.  ROS of the following were done and are negative: Constitutional, Eyes, Ears, Nose,   Mouth, Throat, Cardiovascular, Respiratory, GI, Genitourinary, Musculoskeletal,   Psychiatric, Endocrine, Allergic/Immunologic.    HPI, past medical history, social history, allergies, medications reviewed as of September 21, 2020      PHYSICAL EXAM:   /78 (BP Location: Right arm, Patient Position: Sitting, Cuff Size: Adult Regular)   Pulse 83   Temp 99  F (37.2  C) (Tympanic)   Resp 20   Skin exam performed as follows: Type 2 skin. Mood appropriate  Alert and Oriented X 3. Well developed, well nourished in no distress.  General appearance: Normal  Head including face: Normal  Eyes: conjunctiva and lids: Normal  Mouth: Lips, teeth, gums: Normal  Neck: Normal  Chest-breast/axillae: Normal  Back: Normal  Spleen and liver: Normal  Cardiovascular: Exam of peripheral vascular system by observation for swelling, varicosities, edema: Normal  Genitalia: groin, buttocks: Normal  Extremities: digits/nails (clubbing): Normal  Eccrine and Apocrine glands: Normal  Right upper extremity: Normal  Left upper extremity: Normal  Right lower extremity: Normal  Left lower extremity: Normal  Skin: Scalp and body hair: See below    Pt deferred exam of breasts, groin,  buttocks: No    Other physical findings:  1. Multiple pigmented macules on extremities and trunk  2. Multiple pigmented macules on face, trunk and extremities  3. Multiple vascular papules on trunk, arms and legs  4. Multiple scattered keratotic plaques       Except as noted above, no other signs of skin cancer or melanoma.     ASSESSMENT/PLAN:   Benign Full skin cancer screening today. . Patient with history of a moderately atypical nevus in 2018  Advised on monthly self exams and 1 year  Patient Education: Appropriate brochures given.    Multiple benign appearing nevi on arms, legs and trunk. Discussed ABCDEs of melanoma and sunscreen.   Multiple lentigos on arms, legs and trunk. Advised benign, no treatment needed.  Multiple scattered angiomas. Advised benign, no treatment needed.   Seborrheic keratosis on arms, legs and trunk. Advised benign, no treatment needed.  Acne Vulgaris - Doing great on spironolactone and tretinoin; would like to control background redness.   --Continue spironolactone 150 mg daily. Advised on potential for hypotension, teratogenetic effects, menstrual irregularities, hyperkalemia and need for Q 6 month K+ checks.  --Start Rhofade QAM  --Continue tretinoin QHS        Follow-up: yearly if doing well/PRN sooner    1.) Patient was asked about new and changing moles. YES  2.) Patient received a complete physical skin examination: YES  3.) Patient was counseled to perform a monthly self skin examination: YES  Scribed By: Sasha Hughes, MS, PAKARLIE

## 2020-09-23 ENCOUNTER — OFFICE VISIT (OUTPATIENT)
Dept: SURGERY | Facility: CLINIC | Age: 40
End: 2020-09-23
Payer: COMMERCIAL

## 2020-09-23 VITALS
SYSTOLIC BLOOD PRESSURE: 99 MMHG | TEMPERATURE: 97.9 F | BODY MASS INDEX: 25.64 KG/M2 | HEART RATE: 82 BPM | HEIGHT: 65 IN | DIASTOLIC BLOOD PRESSURE: 71 MMHG | WEIGHT: 153.88 LBS

## 2020-09-23 DIAGNOSIS — R22.2 SUBCUTANEOUS NODULE OF ABDOMINAL WALL: ICD-10-CM

## 2020-09-23 DIAGNOSIS — Z01.818 PREOP TESTING: ICD-10-CM

## 2020-09-23 DIAGNOSIS — K80.20 GALLSTONES: Primary | ICD-10-CM

## 2020-09-23 PROCEDURE — 99214 OFFICE O/P EST MOD 30 MIN: CPT | Performed by: SURGERY

## 2020-09-23 ASSESSMENT — MIFFLIN-ST. JEOR: SCORE: 1365.94

## 2020-09-23 NOTE — LETTER
2020         RE: Shirlene Rodriguez  [address] [place] [address]        Dear Colleague,    Thank you for referring your patient, Shirlene Rodriguez, to the [facility]. Please see a copy of my visit note below.    40-year-old female here for follow-up of CT scan.  Patient continues to have intermittent low left abdominal pain.  We reviewed the images a CT scan together showing a mass overlying the muscle at the lateral edge of her  scar.  Although I cannot appreciate this on physical exam patient can usually always find it.  CT scan also showed a gallbladder full of stones.  We discussed the symptoms of gallstones and she seems to have these although they seem to be mild.  She is requesting a single surgery to remove gallbladder and this mass.    Patient Active Problem List   Diagnosis     GERD (gastroesophageal reflux disease)     History of      Anxiety     Acne     Acquired hypothyroidism     mirena IUD 2020     Gallstones       Past Medical History:   Diagnosis Date     Abnormal Pap smear of cervix 2011     Acne      Allergies      Anxiety state, unspecified 2005     Major depression in complete remission (H) 2012     Papanicolaou smear of cervix with low grade squamous intraepithelial lesion (LGSIL)     HPV 16 - high risk - colp negative     Unspecified asthma(493.90)      Unspecified hypothyroidism 2005       Past Surgical History:   Procedure Laterality Date     CARDIAC STRESS TST,COMPLETE      Normal      SECTION  2012    Procedure:  SECTION;   SECTION;  Surgeon: Warner Ac MD;  [facility]      SECTION, TUBAL LIGATION, COMBINED N/A 2016    Procedure: COMBINED  SECTION, TUBAL LIGATION;  Surgeon: Warner Ac MD;  Location: [facility]     GYN SURGERY  2012    C section     SURGICAL HISTORY OF -       root canal       Family History   Problem Relation  Age of Onset     Alcohol/Drug Mother         alcoholic     Pulmonary Embolism Mother      Prostate Cancer Father 65     Alzheimer Disease Paternal Grandfather      Myocardial Infarction Paternal Grandmother        Social History     Tobacco Use     Smoking status: Never Smoker     Smokeless tobacco: Never Used   Substance Use Topics     Alcohol use: Yes     Comment: rare        History   Drug Use No       Current Outpatient Medications   Medication Sig Dispense Refill     albuterol (PROAIR HFA/PROVENTIL HFA/VENTOLIN HFA) 108 (90 Base) MCG/ACT inhaler Inhale 2 puffs into the lungs every 6 hours as needed for shortness of breath / dyspnea or wheezing (Patient not taking: Reported on 8/1/2019) 18 g 1     busPIRone (BUSPAR) 10 MG tablet Take 1 tablet (10 mg) by mouth 3 times daily 90 tablet 1     doxycycline monohydrate (MONODOX) 100 MG capsule Take one tab BID with food and full glass of water 60 capsule 1     levonorgestrel (MIRENA) 20 MCG/24HR IUD 1 each (20 mcg) by Intrauterine route once       levothyroxine (SYNTHROID/LEVOTHROID) 100 MCG tablet TAKE ONE TABLET BY MOUTH ONCE DAILY 90 tablet 3     oxymetazoline HCl (RHOFADE) 1 % cream Apply a pea-sized amount once daily to the entire face or affected area avoiding the eyes and lips. Wash hands immediately after applying. 30 g 11     propranolol (INDERAL) 20 MG tablet Take 1 tablet (20 mg) by mouth 2 times daily as needed (anxiety) 30 tablet 1     spironolactone (ALDACTONE) 100 MG tablet 1 tab po daily 90 tablet 3     spironolactone (ALDACTONE) 50 MG tablet Take 1 tablet (50 mg) by mouth daily 90 tablet 3     tretinoin (RETIN-A) 0.025 % external cream Apply pea size amount to entire face QD 45 g 11       Allergies   Allergen Reactions     No Known Allergies         Results for orders placed or performed during the hospital encounter of 09/17/20   CT Abdomen Pelvis w Contrast    Narrative    CT ABDOMEN AND PELVIS WITH CONTRAST 9/17/2020 2:39 PM    CLINICAL HISTORY:  Hernia, complicated. Left lower quadrant abdominal  pain and possible subcutaneous mass. Subcutaneous nodule of abdominal  wall.    TECHNIQUE: CT scan of the abdomen and pelvis was performed following  injection of IV contrast. Multiplanar reformats were obtained. Dose  reduction techniques were used.    CONTRAST: 75 mL Isovue 370    COMPARISON: None.    FINDINGS:   LOWER CHEST: Normal.    HEPATOBILIARY: Multiple stones are seen within the gallbladder.    PANCREAS: Normal.    SPLEEN: Normal.    ADRENAL GLANDS: Normal.    KIDNEYS/BLADDER: Normal.    BOWEL: Normal.    PELVIC ORGANS: An IUD is present within the uterus. No evidence of  free fluid or inflammatory change through the pelvis.    ADDITIONAL FINDINGS: Superficial to the left rectus sheath muscle in  the pelvis, there is a 21 x 13 mm soft tissue abnormality with some  minimal adjacent edema on image 70 of series 2. This appears to  correlate with the palpable abnormality. This is indeterminate and  could be inflammatory, reactive, or neoplastic. This likely could be  sampled percutaneously with ultrasound guidance if indicated.    MUSCULOSKELETAL: Normal.      Impression    IMPRESSION:   1.  Palpable soft tissue lesion in the left lower abdomen is in the  subcutaneous fat just superficial to the left rectus muscle. This is  indeterminate, but could likely be sampled percutaneously under  ultrasound guidance.  2.  Cholelithiasis.    MD GUILLERMINA MANE  Constitutional - Denies fevers, weight loss, malaise, lethargy  Neuro - Denies tremors or seizures  Pulmon - Denies SOB, dyspnea, hemoptysis, chronic cough or use of an inhaler  CV - Denies CP, SOB, lower extremity edema, difficulty w/ stairs, has never used NTG  GI - Denies hematemesis, BRBPR, melena, chronic diarrhea or epigastric pain   - Denies hematuria, difficulty voiding, h/o STDs  Hematology - Denies blood clotting disorders, chronic anemias  Dermatology - No melanomas or skin  "cancers  Rheumatology - No h/o RA  Pysch - Denies depression, bipolar d/o or schizophrenia    Exam:BP 99/71 (BP Location: Right arm, Patient Position: Sitting, Cuff Size: Adult Regular)   Pulse 82   Temp 97.9  F (36.6  C) (Tympanic)   Ht 1.638 m (5' 4.5\")   Wt 69.8 kg (153 lb 14.1 oz)   BMI 26.01 kg/m  '    General - Alert and Oriented X4, NAD, well nourished  HEENT - Normocephalic, atraumatic, PERRLA, Nose midline, Throat without lesions  Neck - supple, no LAD, Thyroid normal, Carotids without bruits  Lungs - Clear to auscultation bilaterally with good inspiratory effort, no tactile fremitus  CV - Heart RRR, no lift's, thrills, murmurs, rubs, or gallops. Carotid, radial, and femoral pulses 2+ bilaterally  Abdomen - Soft, non-tender, +BS, no hepatosplenomegaly, no appreciable palpable masses  Neuro - Full ROM, Strength 5/5 and major muscle groups, sensation intact  Extremities - No cyanosis, clubbing or edema    Assessment and plan: 40-year-old female with symptomatic cholelithiasis and left lower quadrant abdominal wall mass.  Mass is suspicious for scar tissue overlying the  incision versus possible endometrioma.  Patient is good candidate for laparoscopic cholecystectomy and open removal of mass.  Risks benefits alternatives and complications were discussed with the patient including the possibility of infection bleeding or mass recurrence.  Also bile leak is a possibility.  Patient understood and wished to proceed. PATIENT IS CLEARED FOR SURGERY.    Dipak Cornelius MD     Again, thank you for allowing me to participate in the care of your patient.        Sincerely,        Dipak Cornelius MD    "

## 2020-09-23 NOTE — PROGRESS NOTES
40-year-old female here for follow-up of CT scan.  Patient continues to have intermittent low left abdominal pain.  We reviewed the images a CT scan together showing a mass overlying the muscle at the lateral edge of her  scar.  Although I cannot appreciate this on physical exam patient can usually always find it.  CT scan also showed a gallbladder full of stones.  We discussed the symptoms of gallstones and she seems to have these although they seem to be mild.  She is requesting a single surgery to remove gallbladder and this mass.    Patient Active Problem List   Diagnosis     GERD (gastroesophageal reflux disease)     History of      Anxiety     Acne     Acquired hypothyroidism     mirena IUD 2020     Gallstones       Past Medical History:   Diagnosis Date     Abnormal Pap smear of cervix 2011     Acne      Allergies      Anxiety state, unspecified 2005     Major depression in complete remission (H) 2012     Papanicolaou smear of cervix with low grade squamous intraepithelial lesion (LGSIL)     HPV 16 - high risk - colp negative     Unspecified asthma(493.90)      Unspecified hypothyroidism 2005       Past Surgical History:   Procedure Laterality Date     CARDIAC STRESS TST,COMPLETE      Normal      SECTION  2012    Procedure:  SECTION;   SECTION;  Surgeon: Warner Ac MD;  [facility]      SECTION, TUBAL LIGATION, COMBINED N/A 2016    Procedure: COMBINED  SECTION, TUBAL LIGATION;  Surgeon: Warner Ac MD;  [facility]     GYN SURGERY  2012    C section     SURGICAL HISTORY OF -       root canal       Family History   Problem Relation Age of Onset     Alcohol/Drug Mother         alcoholic     Pulmonary Embolism Mother      Prostate Cancer Father 65     Alzheimer Disease Paternal Grandfather      Myocardial Infarction Paternal Grandmother        Social History     Tobacco Use      Smoking status: Never Smoker     Smokeless tobacco: Never Used   Substance Use Topics     Alcohol use: Yes     Comment: rare        History   Drug Use No       Current Outpatient Medications   Medication Sig Dispense Refill     albuterol (PROAIR HFA/PROVENTIL HFA/VENTOLIN HFA) 108 (90 Base) MCG/ACT inhaler Inhale 2 puffs into the lungs every 6 hours as needed for shortness of breath / dyspnea or wheezing (Patient not taking: Reported on 8/1/2019) 18 g 1     busPIRone (BUSPAR) 10 MG tablet Take 1 tablet (10 mg) by mouth 3 times daily 90 tablet 1     doxycycline monohydrate (MONODOX) 100 MG capsule Take one tab BID with food and full glass of water 60 capsule 1     levonorgestrel (MIRENA) 20 MCG/24HR IUD 1 each (20 mcg) by Intrauterine route once       levothyroxine (SYNTHROID/LEVOTHROID) 100 MCG tablet TAKE ONE TABLET BY MOUTH ONCE DAILY 90 tablet 3     oxymetazoline HCl (RHOFADE) 1 % cream Apply a pea-sized amount once daily to the entire face or affected area avoiding the eyes and lips. Wash hands immediately after applying. 30 g 11     propranolol (INDERAL) 20 MG tablet Take 1 tablet (20 mg) by mouth 2 times daily as needed (anxiety) 30 tablet 1     spironolactone (ALDACTONE) 100 MG tablet 1 tab po daily 90 tablet 3     spironolactone (ALDACTONE) 50 MG tablet Take 1 tablet (50 mg) by mouth daily 90 tablet 3     tretinoin (RETIN-A) 0.025 % external cream Apply pea size amount to entire face QD 45 g 11       Allergies   Allergen Reactions     No Known Allergies         Results for orders placed or performed during the hospital encounter of 09/17/20   CT Abdomen Pelvis w Contrast    Narrative    CT ABDOMEN AND PELVIS WITH CONTRAST 9/17/2020 2:39 PM    CLINICAL HISTORY: Hernia, complicated. Left lower quadrant abdominal  pain and possible subcutaneous mass. Subcutaneous nodule of abdominal  wall.    TECHNIQUE: CT scan of the abdomen and pelvis was performed following  injection of IV contrast. Multiplanar  reformats were obtained. Dose  reduction techniques were used.    CONTRAST: 75 mL Isovue 370    COMPARISON: None.    FINDINGS:   LOWER CHEST: Normal.    HEPATOBILIARY: Multiple stones are seen within the gallbladder.    PANCREAS: Normal.    SPLEEN: Normal.    ADRENAL GLANDS: Normal.    KIDNEYS/BLADDER: Normal.    BOWEL: Normal.    PELVIC ORGANS: An IUD is present within the uterus. No evidence of  free fluid or inflammatory change through the pelvis.    ADDITIONAL FINDINGS: Superficial to the left rectus sheath muscle in  the pelvis, there is a 21 x 13 mm soft tissue abnormality with some  minimal adjacent edema on image 70 of series 2. This appears to  correlate with the palpable abnormality. This is indeterminate and  could be inflammatory, reactive, or neoplastic. This likely could be  sampled percutaneously with ultrasound guidance if indicated.    MUSCULOSKELETAL: Normal.      Impression    IMPRESSION:   1.  Palpable soft tissue lesion in the left lower abdomen is in the  subcutaneous fat just superficial to the left rectus muscle. This is  indeterminate, but could likely be sampled percutaneously under  ultrasound guidance.  2.  Cholelithiasis.    NATA DUGGAN MD     ROS  Constitutional - Denies fevers, weight loss, malaise, lethargy  Neuro - Denies tremors or seizures  Pulmon - Denies SOB, dyspnea, hemoptysis, chronic cough or use of an inhaler  CV - Denies CP, SOB, lower extremity edema, difficulty w/ stairs, has never used NTG  GI - Denies hematemesis, BRBPR, melena, chronic diarrhea or epigastric pain   - Denies hematuria, difficulty voiding, h/o STDs  Hematology - Denies blood clotting disorders, chronic anemias  Dermatology - No melanomas or skin cancers  Rheumatology - No h/o RA  Pysch - Denies depression, bipolar d/o or schizophrenia    Exam:BP 99/71 (BP Location: Right arm, Patient Position: Sitting, Cuff Size: Adult Regular)   Pulse 82   Temp 97.9  F (36.6  C) (Tympanic)   Ht 1.638 m (5'  "4.5\")   Wt 69.8 kg (153 lb 14.1 oz)   BMI 26.01 kg/m  '    General - Alert and Oriented X4, NAD, well nourished  HEENT - Normocephalic, atraumatic, PERRLA, Nose midline, Throat without lesions  Neck - supple, no LAD, Thyroid normal, Carotids without bruits  Lungs - Clear to auscultation bilaterally with good inspiratory effort, no tactile fremitus  CV - Heart RRR, no lift's, thrills, murmurs, rubs, or gallops. Carotid, radial, and femoral pulses 2+ bilaterally  Abdomen - Soft, non-tender, +BS, no hepatosplenomegaly, no appreciable palpable masses  Neuro - Full ROM, Strength 5/5 and major muscle groups, sensation intact  Extremities - No cyanosis, clubbing or edema    Assessment and plan: 40-year-old female with symptomatic cholelithiasis and left lower quadrant abdominal wall mass.  Mass is suspicious for scar tissue overlying the  incision versus possible endometrioma.  Patient is good candidate for laparoscopic cholecystectomy and open removal of mass.  Risks benefits alternatives and complications were discussed with the patient including the possibility of infection bleeding or mass recurrence.  Also bile leak is a possibility.  Patient understood and wished to proceed. PATIENT IS CLEARED FOR SURGERY.    Dipak Cornelius MD   "

## 2020-09-23 NOTE — NURSING NOTE
"Initial BP 99/71 (BP Location: Right arm, Patient Position: Sitting, Cuff Size: Adult Regular)   Pulse 82   Temp 97.9  F (36.6  C) (Tympanic)   Ht 1.638 m (5' 4.5\")   Wt 69.8 kg (153 lb 14.1 oz)   BMI 26.01 kg/m   Estimated body mass index is 26.01 kg/m  as calculated from the following:    Height as of this encounter: 1.638 m (5' 4.5\").    Weight as of this encounter: 69.8 kg (153 lb 14.1 oz). .    Kristin King MA    "

## 2020-09-30 ENCOUNTER — ANESTHESIA EVENT (OUTPATIENT)
Dept: SURGERY | Facility: CLINIC | Age: 40
End: 2020-09-30
Payer: COMMERCIAL

## 2020-10-02 DIAGNOSIS — Z01.818 PREOP TESTING: ICD-10-CM

## 2020-10-02 DIAGNOSIS — K80.20 GALLSTONES: ICD-10-CM

## 2020-10-02 PROCEDURE — U0003 INFECTIOUS AGENT DETECTION BY NUCLEIC ACID (DNA OR RNA); SEVERE ACUTE RESPIRATORY SYNDROME CORONAVIRUS 2 (SARS-COV-2) (CORONAVIRUS DISEASE [COVID-19]), AMPLIFIED PROBE TECHNIQUE, MAKING USE OF HIGH THROUGHPUT TECHNOLOGIES AS DESCRIBED BY CMS-2020-01-R: HCPCS | Performed by: SURGERY

## 2020-10-02 PROCEDURE — 36415 COLL VENOUS BLD VENIPUNCTURE: CPT | Performed by: SURGERY

## 2020-10-03 LAB
SARS-COV-2 RNA SPEC QL NAA+PROBE: NOT DETECTED
SPECIMEN SOURCE: NORMAL

## 2020-10-06 ENCOUNTER — ANESTHESIA (OUTPATIENT)
Dept: SURGERY | Facility: CLINIC | Age: 40
End: 2020-10-06
Payer: COMMERCIAL

## 2020-10-06 ENCOUNTER — HOSPITAL ENCOUNTER (OUTPATIENT)
Facility: CLINIC | Age: 40
Discharge: HOME | End: 2020-10-06
Attending: SURGERY | Admitting: SURGERY
Payer: COMMERCIAL

## 2020-10-06 VITALS
HEART RATE: 74 BPM | OXYGEN SATURATION: 98 % | RESPIRATION RATE: 16 BRPM | BODY MASS INDEX: 26.12 KG/M2 | TEMPERATURE: 97.7 F | SYSTOLIC BLOOD PRESSURE: 106 MMHG | WEIGHT: 153 LBS | HEIGHT: 64 IN | DIASTOLIC BLOOD PRESSURE: 62 MMHG

## 2020-10-06 DIAGNOSIS — G89.18 POSTOPERATIVE PAIN: Primary | ICD-10-CM

## 2020-10-06 DIAGNOSIS — K80.20 GALLSTONES: ICD-10-CM

## 2020-10-06 LAB — HCG UR QL: NEGATIVE

## 2020-10-06 PROCEDURE — 761N000001 HC RECOVERY PHASE 1 LEVEL 1 FIRST HR: Performed by: SURGERY

## 2020-10-06 PROCEDURE — 250N000003 HC SEVOFLURANE, EA 15 MIN: Performed by: SURGERY

## 2020-10-06 PROCEDURE — 360N000021 HC SURGERY LEVEL 3 EA 15 ADDTL MIN: Performed by: SURGERY

## 2020-10-06 PROCEDURE — 88305 TISSUE EXAM BY PATHOLOGIST: CPT | Mod: 26 | Performed by: PATHOLOGY

## 2020-10-06 PROCEDURE — 258N000003 HC RX IP 258 OP 636: Performed by: NURSE ANESTHETIST, CERTIFIED REGISTERED

## 2020-10-06 PROCEDURE — 250N000009 HC RX 250: Performed by: NURSE ANESTHETIST, CERTIFIED REGISTERED

## 2020-10-06 PROCEDURE — 250N000013 HC RX MED GY IP 250 OP 250 PS 637: Performed by: NURSE ANESTHETIST, CERTIFIED REGISTERED

## 2020-10-06 PROCEDURE — 761N000007 HC RECOVERY PHASE 2 EACH 15 MINS: Performed by: SURGERY

## 2020-10-06 PROCEDURE — 250N000011 HC RX IP 250 OP 636: Performed by: NURSE ANESTHETIST, CERTIFIED REGISTERED

## 2020-10-06 PROCEDURE — 250N000013 HC RX MED GY IP 250 OP 250 PS 637: Performed by: SURGERY

## 2020-10-06 PROCEDURE — 250N000011 HC RX IP 250 OP 636: Performed by: SURGERY

## 2020-10-06 PROCEDURE — 81025 URINE PREGNANCY TEST: CPT | Performed by: NURSE ANESTHETIST, CERTIFIED REGISTERED

## 2020-10-06 PROCEDURE — 88304 TISSUE EXAM BY PATHOLOGIST: CPT | Mod: TC | Performed by: SURGERY

## 2020-10-06 PROCEDURE — 250N000009 HC RX 250: Performed by: SURGERY

## 2020-10-06 PROCEDURE — 360N000020 HC SURGERY LEVEL 3 1ST 30 MIN: Performed by: SURGERY

## 2020-10-06 PROCEDURE — 271N000001 HC OR GENERAL SUPPLY NON-STERILE: Performed by: SURGERY

## 2020-10-06 PROCEDURE — 22903 EXC ABD LES SC 3 CM/>: CPT | Mod: 51 | Performed by: SURGERY

## 2020-10-06 PROCEDURE — 999N000135 HC STATISTIC PRE PROC ASSESS I: Performed by: SURGERY

## 2020-10-06 PROCEDURE — 47562 LAPAROSCOPIC CHOLECYSTECTOMY: CPT | Mod: AS | Performed by: PHYSICIAN ASSISTANT

## 2020-10-06 PROCEDURE — 88304 TISSUE EXAM BY PATHOLOGIST: CPT | Mod: 26 | Performed by: PATHOLOGY

## 2020-10-06 PROCEDURE — 47562 LAPAROSCOPIC CHOLECYSTECTOMY: CPT | Performed by: SURGERY

## 2020-10-06 PROCEDURE — 370N000001 HC ANESTHESIA TECHNICAL FEE, 1ST 30 MIN: Performed by: SURGERY

## 2020-10-06 PROCEDURE — 272N000001 HC OR GENERAL SUPPLY STERILE: Performed by: SURGERY

## 2020-10-06 PROCEDURE — 88305 TISSUE EXAM BY PATHOLOGIST: CPT | Mod: TC | Performed by: SURGERY

## 2020-10-06 PROCEDURE — 370N000002 HC ANESTHESIA TECHNICAL FEE, EACH ADDTL 15 MIN: Performed by: SURGERY

## 2020-10-06 RX ORDER — HYDROCODONE BITARTRATE AND ACETAMINOPHEN 5; 325 MG/1; MG/1
1-2 TABLET ORAL EVERY 4 HOURS PRN
Status: DISCONTINUED | OUTPATIENT
Start: 2020-10-06 | End: 2020-10-06 | Stop reason: HOSPADM

## 2020-10-06 RX ORDER — ONDANSETRON 2 MG/ML
4 INJECTION INTRAMUSCULAR; INTRAVENOUS EVERY 30 MIN PRN
Status: DISCONTINUED | OUTPATIENT
Start: 2020-10-06 | End: 2020-10-06 | Stop reason: HOSPADM

## 2020-10-06 RX ORDER — SODIUM CHLORIDE, SODIUM LACTATE, POTASSIUM CHLORIDE, CALCIUM CHLORIDE 600; 310; 30; 20 MG/100ML; MG/100ML; MG/100ML; MG/100ML
INJECTION, SOLUTION INTRAVENOUS CONTINUOUS
Status: DISCONTINUED | OUTPATIENT
Start: 2020-10-06 | End: 2020-10-06 | Stop reason: HOSPADM

## 2020-10-06 RX ORDER — OXYCODONE HYDROCHLORIDE 5 MG/1
5 TABLET ORAL EVERY 4 HOURS PRN
Status: DISCONTINUED | OUTPATIENT
Start: 2020-10-06 | End: 2020-10-06 | Stop reason: HOSPADM

## 2020-10-06 RX ORDER — ACETAMINOPHEN 325 MG/1
975 TABLET ORAL ONCE
Status: COMPLETED | OUTPATIENT
Start: 2020-10-06 | End: 2020-10-06

## 2020-10-06 RX ORDER — FENTANYL CITRATE 50 UG/ML
INJECTION, SOLUTION INTRAMUSCULAR; INTRAVENOUS PRN
Status: DISCONTINUED | OUTPATIENT
Start: 2020-10-06 | End: 2020-10-06

## 2020-10-06 RX ORDER — LIDOCAINE HYDROCHLORIDE 10 MG/ML
INJECTION, SOLUTION INFILTRATION; PERINEURAL PRN
Status: DISCONTINUED | OUTPATIENT
Start: 2020-10-06 | End: 2020-10-06

## 2020-10-06 RX ORDER — GLYCOPYRROLATE 0.2 MG/ML
INJECTION, SOLUTION INTRAMUSCULAR; INTRAVENOUS PRN
Status: DISCONTINUED | OUTPATIENT
Start: 2020-10-06 | End: 2020-10-06

## 2020-10-06 RX ORDER — ONDANSETRON 2 MG/ML
INJECTION INTRAMUSCULAR; INTRAVENOUS PRN
Status: DISCONTINUED | OUTPATIENT
Start: 2020-10-06 | End: 2020-10-06

## 2020-10-06 RX ORDER — NALOXONE HYDROCHLORIDE 0.4 MG/ML
.1-.4 INJECTION, SOLUTION INTRAMUSCULAR; INTRAVENOUS; SUBCUTANEOUS
Status: DISCONTINUED | OUTPATIENT
Start: 2020-10-06 | End: 2020-10-06 | Stop reason: HOSPADM

## 2020-10-06 RX ORDER — DEXAMETHASONE SODIUM PHOSPHATE 4 MG/ML
4 INJECTION, SOLUTION INTRA-ARTICULAR; INTRALESIONAL; INTRAMUSCULAR; INTRAVENOUS; SOFT TISSUE
Status: DISCONTINUED | OUTPATIENT
Start: 2020-10-06 | End: 2020-10-06 | Stop reason: HOSPADM

## 2020-10-06 RX ORDER — HYDROCODONE BITARTRATE AND ACETAMINOPHEN 5; 325 MG/1; MG/1
1-2 TABLET ORAL EVERY 6 HOURS PRN
Qty: 20 TABLET | Refills: 0 | Status: SHIPPED | OUTPATIENT
Start: 2020-10-06 | End: 2020-11-13

## 2020-10-06 RX ORDER — BUPIVACAINE HYDROCHLORIDE AND EPINEPHRINE 2.5; 5 MG/ML; UG/ML
INJECTION, SOLUTION INFILTRATION; PERINEURAL PRN
Status: DISCONTINUED | OUTPATIENT
Start: 2020-10-06 | End: 2020-10-06 | Stop reason: HOSPADM

## 2020-10-06 RX ORDER — MAGNESIUM SULFATE HEPTAHYDRATE 40 MG/ML
2 INJECTION, SOLUTION INTRAVENOUS ONCE
Status: COMPLETED | OUTPATIENT
Start: 2020-10-06 | End: 2020-10-06

## 2020-10-06 RX ORDER — DEXAMETHASONE SODIUM PHOSPHATE 4 MG/ML
INJECTION, SOLUTION INTRA-ARTICULAR; INTRALESIONAL; INTRAMUSCULAR; INTRAVENOUS; SOFT TISSUE PRN
Status: DISCONTINUED | OUTPATIENT
Start: 2020-10-06 | End: 2020-10-06

## 2020-10-06 RX ORDER — PROPOFOL 10 MG/ML
INJECTION, EMULSION INTRAVENOUS PRN
Status: DISCONTINUED | OUTPATIENT
Start: 2020-10-06 | End: 2020-10-06

## 2020-10-06 RX ORDER — LABETALOL HYDROCHLORIDE 5 MG/ML
10 INJECTION, SOLUTION INTRAVENOUS
Status: DISCONTINUED | OUTPATIENT
Start: 2020-10-06 | End: 2020-10-06 | Stop reason: HOSPADM

## 2020-10-06 RX ORDER — LIDOCAINE HYDROCHLORIDE 10 MG/ML
INJECTION, SOLUTION INFILTRATION; PERINEURAL PRN
Status: DISCONTINUED | OUTPATIENT
Start: 2020-10-06 | End: 2020-10-06 | Stop reason: HOSPADM

## 2020-10-06 RX ORDER — FENTANYL CITRATE 50 UG/ML
25-50 INJECTION, SOLUTION INTRAMUSCULAR; INTRAVENOUS
Status: DISCONTINUED | OUTPATIENT
Start: 2020-10-06 | End: 2020-10-06 | Stop reason: HOSPADM

## 2020-10-06 RX ORDER — ONDANSETRON 4 MG/1
4 TABLET, ORALLY DISINTEGRATING ORAL EVERY 30 MIN PRN
Status: DISCONTINUED | OUTPATIENT
Start: 2020-10-06 | End: 2020-10-06 | Stop reason: HOSPADM

## 2020-10-06 RX ORDER — CEFAZOLIN SODIUM 2 G/100ML
2 INJECTION, SOLUTION INTRAVENOUS
Status: COMPLETED | OUTPATIENT
Start: 2020-10-06 | End: 2020-10-06

## 2020-10-06 RX ORDER — ACETAMINOPHEN 325 MG/1
975 TABLET ORAL ONCE
Status: DISCONTINUED | OUTPATIENT
Start: 2020-10-06 | End: 2020-10-06 | Stop reason: HOSPADM

## 2020-10-06 RX ORDER — GABAPENTIN 300 MG/1
300 CAPSULE ORAL ONCE
Status: COMPLETED | OUTPATIENT
Start: 2020-10-06 | End: 2020-10-06

## 2020-10-06 RX ORDER — CEFAZOLIN SODIUM 1 G/50ML
1 INJECTION, SOLUTION INTRAVENOUS SEE ADMIN INSTRUCTIONS
Status: DISCONTINUED | OUTPATIENT
Start: 2020-10-06 | End: 2020-10-06 | Stop reason: HOSPADM

## 2020-10-06 RX ORDER — CELECOXIB 200 MG/1
200 CAPSULE ORAL ONCE
Status: COMPLETED | OUTPATIENT
Start: 2020-10-06 | End: 2020-10-06

## 2020-10-06 RX ORDER — ONDANSETRON 2 MG/ML
4 INJECTION INTRAMUSCULAR; INTRAVENOUS ONCE
Status: COMPLETED | OUTPATIENT
Start: 2020-10-06 | End: 2020-10-06

## 2020-10-06 RX ORDER — NEOSTIGMINE METHYLSULFATE 1 MG/ML
VIAL (ML) INJECTION PRN
Status: DISCONTINUED | OUTPATIENT
Start: 2020-10-06 | End: 2020-10-06

## 2020-10-06 RX ORDER — DIMENHYDRINATE 50 MG/ML
25 INJECTION, SOLUTION INTRAMUSCULAR; INTRAVENOUS
Status: DISCONTINUED | OUTPATIENT
Start: 2020-10-06 | End: 2020-10-06 | Stop reason: HOSPADM

## 2020-10-06 RX ORDER — LIDOCAINE 40 MG/G
CREAM TOPICAL
Status: DISCONTINUED | OUTPATIENT
Start: 2020-10-06 | End: 2020-10-06 | Stop reason: HOSPADM

## 2020-10-06 RX ADMIN — FENTANYL CITRATE 100 MCG: 50 INJECTION, SOLUTION INTRAMUSCULAR; INTRAVENOUS at 09:20

## 2020-10-06 RX ADMIN — GLYCOPYRROLATE 0.6 MG: 0.2 INJECTION, SOLUTION INTRAMUSCULAR; INTRAVENOUS at 10:05

## 2020-10-06 RX ADMIN — PROPOFOL 150 MG: 10 INJECTION, EMULSION INTRAVENOUS at 09:20

## 2020-10-06 RX ADMIN — ROCURONIUM BROMIDE 30 MG: 10 INJECTION INTRAVENOUS at 09:20

## 2020-10-06 RX ADMIN — HYDROCODONE BITARTRATE AND ACETAMINOPHEN 1 TABLET: 5; 325 TABLET ORAL at 10:52

## 2020-10-06 RX ADMIN — Medication 4 MG: at 10:05

## 2020-10-06 RX ADMIN — ACETAMINOPHEN 975 MG: 325 TABLET, FILM COATED ORAL at 08:45

## 2020-10-06 RX ADMIN — GABAPENTIN 300 MG: 300 CAPSULE ORAL at 08:45

## 2020-10-06 RX ADMIN — CELECOXIB 200 MG: 200 CAPSULE ORAL at 08:45

## 2020-10-06 RX ADMIN — SODIUM CHLORIDE, POTASSIUM CHLORIDE, SODIUM LACTATE AND CALCIUM CHLORIDE: 600; 310; 30; 20 INJECTION, SOLUTION INTRAVENOUS at 09:18

## 2020-10-06 RX ADMIN — ONDANSETRON 4 MG: 2 INJECTION INTRAMUSCULAR; INTRAVENOUS at 11:55

## 2020-10-06 RX ADMIN — ONDANSETRON 4 MG: 2 INJECTION INTRAMUSCULAR; INTRAVENOUS at 09:37

## 2020-10-06 RX ADMIN — SODIUM CHLORIDE, POTASSIUM CHLORIDE, SODIUM LACTATE AND CALCIUM CHLORIDE 100 ML: 600; 310; 30; 20 INJECTION, SOLUTION INTRAVENOUS at 10:53

## 2020-10-06 RX ADMIN — LIDOCAINE HYDROCHLORIDE 40 MG: 10 INJECTION, SOLUTION INFILTRATION; PERINEURAL at 09:20

## 2020-10-06 RX ADMIN — DEXAMETHASONE SODIUM PHOSPHATE 4 MG: 4 INJECTION, SOLUTION INTRA-ARTICULAR; INTRALESIONAL; INTRAMUSCULAR; INTRAVENOUS; SOFT TISSUE at 09:37

## 2020-10-06 RX ADMIN — MIDAZOLAM 2 MG: 1 INJECTION INTRAMUSCULAR; INTRAVENOUS at 09:20

## 2020-10-06 RX ADMIN — CEFAZOLIN SODIUM 2 G: 2 INJECTION, SOLUTION INTRAVENOUS at 09:21

## 2020-10-06 RX ADMIN — LIDOCAINE HYDROCHLORIDE 0.2 ML: 10 INJECTION, SOLUTION EPIDURAL; INFILTRATION; INTRACAUDAL; PERINEURAL at 08:49

## 2020-10-06 RX ADMIN — HYDROMORPHONE HYDROCHLORIDE 0.5 MG: 1 INJECTION, SOLUTION INTRAMUSCULAR; INTRAVENOUS; SUBCUTANEOUS at 10:35

## 2020-10-06 RX ADMIN — HYDROMORPHONE HYDROCHLORIDE 1 MG: 1 INJECTION, SOLUTION INTRAMUSCULAR; INTRAVENOUS; SUBCUTANEOUS at 09:45

## 2020-10-06 RX ADMIN — HYDROMORPHONE HYDROCHLORIDE 0.5 MG: 1 INJECTION, SOLUTION INTRAMUSCULAR; INTRAVENOUS; SUBCUTANEOUS at 12:24

## 2020-10-06 RX ADMIN — MAGNESIUM SULFATE 2 G: 2 INJECTION INTRAVENOUS at 09:15

## 2020-10-06 ASSESSMENT — MIFFLIN-ST. JEOR: SCORE: 1354

## 2020-10-06 NOTE — DISCHARGE INSTRUCTIONS
Same Day Surgery Discharge Instructions  Special Precautions After Surgery - Adult    1. It is not unusual to feel lightheaded or faint, up to 24 hours after surgery or while taking pain medication.  If you have these symptoms; sit for a few minutes before standing and have someone assist you when getting up.  2. You should rest and relax for the next 24 hours and must have someone stay with you for at least 24 hours after your discharge.  3. DO NOT DRIVE any vehicle or operate mechanical equipment for 24 hours following the end of your surgery.  DO NOT DRIVE while taking narcotic pain medications that have been prescribed by your physician.  If you had a limb operated on, you must be able to use it fully to drive.  4. DO NOT drink alcoholic beverages for 24 hours following surgery or while taking prescription pain medication.  5. Drink clear liquids (apple juice, ginger ale, broth, 7-Up, etc.).  Progress to your regular diet as you feel able.  6. Any questions call your physician and do not make important decisions for 24 hours.    ACTIVITY  ? Resume activity as tolerated     INCISIONAL CARE  ? May shower starting tomorrow  ? Be alert for signs of infection: fever, pus like or foul smelling drainage then call dr. cornelius     Call for an appointment to return to the clinic    call to make a post op appointment with dr. Cornelius for 10-14 days    Medications:  ? Ibuprofen next dose could be taken at 8:30pm  ? Hydrocodone-acetaminophen 5-325 mg 1-2 tablets every 6 hours as needed for pain may start anytime  ? Stool softeners daily to prevent constipation from narcotic  __________________________________________________________________________________________________________________________________  IMPORTANT NUMBERS:    [facility] [place] Main Number:  [phone], [phone]  Pharmacy:  [phone]  Same Day Surgery:  [phone], Monday - Friday until 8:30 p.m.  Urgent Care:  [phone]  Emergency Room:   [phone]                                                                               [facility]:  [phone]   [facility]:  [phone]    DISCHARGE INSTRUCTIONS     1. You may resume your regular diet when you feel you are ready    2. No heavy lifting (>30lbs)  for 1 month.    3. You will have some discomfort at the incision sites. This is expected. This should improve over the next 2-3 days. Ice and pain medication will help with this pain. Use prescribed pain medication as instructed.     4. Some bruising and mild swelling is normal after surgery. The area below and around the incision(s) may be hard and elevated. This is part of the normal healing process. This will resolve slowly over the next several months.     5. Your wounds are covered with glue. The glue is water tight and so you can shower or bathe immediately following surgery.     6. Use the following medications (in addition to your normal meds) as shown:      Tylenol (acetaminophen) 500 mg every 6 hours as needed for pain.    Do not take more than 1000 mg of Tylenol every 6 hours -OR- 4g in a day    Motrin (ibuprophen) 600 mg every 6 hours as needed for pain. Take with food.     8. Notify Clinic at [phone] if:     Your discomfort is not relieved by your pain medication     You have signs of infection such as temperature above 100.4 degrees orally,  chills, or increasing daily discomfort.     Incision site is becoming more red and/or there is purulent drainage.      9. Follow up with Dr Cornelius in 1-2 weeks.

## 2020-10-06 NOTE — ANESTHESIA PREPROCEDURE EVALUATION
Anesthesia Pre-Procedure Evaluation    Patient: Shirlene Rodriguez   MRN: [id number] : 1980          Preoperative Diagnosis: Gallstones [K80.20]    Procedure(s):  laparoscopic cholecystectomy and removal of abdominal wall mass    Past Medical History:   Diagnosis Date     Abnormal Pap smear of cervix 2011     Acne      Allergies      Anxiety state, unspecified 2005     Major depression in complete remission (H) 2012     Papanicolaou smear of cervix with low grade squamous intraepithelial lesion (LGSIL)     HPV 16 - high risk - colp negative     Unspecified asthma(493.90)      Unspecified hypothyroidism 2005     Past Surgical History:   Procedure Laterality Date     CARDIAC STRESS TST,COMPLETE      Normal      SECTION  2012    Procedure:  SECTION;   SECTION;  Surgeon: Warner cA MD;  Location: [facility]      SECTION, TUBAL LIGATION, COMBINED N/A 2016    Procedure: COMBINED  SECTION, TUBAL LIGATION;  Surgeon: Warner Ac MD;  Location: [facility]     GYN SURGERY  2012    C section     SURGICAL HISTORY OF -       root canal       Anesthesia Evaluation     .             ROS/MED HX    ENT/Pulmonary:       Neurologic:  - neg neurologic ROS     Cardiovascular:         METS/Exercise Tolerance:     Hematologic:         Musculoskeletal:         GI/Hepatic:     (+) GERD       Renal/Genitourinary:         Endo:     (+) thyroid problem hypothyroidism, .      Psychiatric:     (+) psychiatric history anxiety      Infectious Disease:         Malignancy:         Other:                                 Lab Results   Component Value Date    WBC 7.5 2020    HGB 15.9 (H) 2020    HCT 47.0 2020     2020    SED 6 2020     2020    POTASSIUM 4.5 2020    CHLORIDE 102 2020    CO2 28 2020    BUN 10 2020    CR 0.92 2020    GLC 84 2020    GAVIN 9.2 2020     "ALBUMIN 4.3 09/11/2020    PROTTOTAL 8.3 09/11/2020    ALT 38 09/11/2020    AST 23 09/11/2020    ALKPHOS 72 09/11/2020    BILITOTAL 0.7 09/11/2020    LIPASE 37 03/22/2007    AMYLASE 55 03/22/2007    TSH 0.39 (L) 09/11/2020    T4 1.30 09/11/2020    HCG Positive (A) 03/14/2012       Preop Vitals  BP Readings from Last 3 Encounters:   09/23/20 99/71   09/21/20 116/78   09/16/20 93/71    Pulse Readings from Last 3 Encounters:   09/23/20 82   09/21/20 83   09/16/20 77      Resp Readings from Last 3 Encounters:   09/21/20 20   09/11/20 16   08/25/20 16    SpO2 Readings from Last 3 Encounters:   09/09/19 98%   02/18/19 99%   12/17/18 99%      Temp Readings from Last 1 Encounters:   09/23/20 36.6  C (97.9  F) (Tympanic)    Ht Readings from Last 1 Encounters:   09/23/20 1.638 m (5' 4.5\")      Wt Readings from Last 1 Encounters:   09/23/20 69.8 kg (153 lb 14.1 oz)    Estimated body mass index is 26.01 kg/m  as calculated from the following:    Height as of 9/23/20: 1.638 m (5' 4.5\").    Weight as of 9/23/20: 69.8 kg (153 lb 14.1 oz).       Anesthesia Plan      History & Physical Review  History and physical reviewed and following examination; no interval change.    ASA Status:  1 .        Plan for General with Intravenous induction. Maintenance will be Balanced.    PONV prophylaxis:  Ondansetron (or other 5HT-3) and Dexamethasone or Solumedrol         Postoperative Care  Postoperative pain management:  IV analgesics.      Consents  Anesthetic plan, risks, benefits and alternatives discussed with:  Patient..                 COLLIN Osorio CRNA  "

## 2020-10-06 NOTE — OP NOTE
Preop diagnosis: Symptomatic cholelithiasis and abdominal wall mass    Postop diagnosis: Same    Procedure: Laparoscopic cholecystectomy and excision of 4 cm round multilobulated firm abdominal wall mass adhered to fascia.    Surgeon: Adryan    Assistant surgeon: Jose De Jesus Vargas PA-C (needed for expertise in camera operation retraction hemostasis wound closure and suctioning)    Anesthesia: General endotracheal Ruzek CRNA    Procedure: Patient's abdomen was cleaned and draped in sterile manner.  1/4% Marcaine with epinephrine was used to anesthetize all port sites.  Small subumbilical curvilinear incision made and subcutaneous tissues dissected to fascia.  Fascia opened sharply and 12 mm blunt trocar inserted.  Carbon dioxide insufflated to 15 mmHg.  Under direct vision, subxiphoid 11 mm trocar placed as were 2 right-sided 5 mm trochars.  Attention was turned to the right upper quadrant.  The gallbladder was easily located.  There was no inflammation.  It was gently grasped and elevated.  Small amount of fat surrounding the neck of the gallbladder was dissected free until the cystic duct was isolated.  It was encircled, clipped twice proximally once distally and ligated.  In a similar fashion 2 separate branches of the cystic artery were located clipped and ligated.  The gallbladder was then removed from liver bed using electrocautery, placed into an Endo Catch bag, and brought out through the subxiphoid port which had to be large slightly to accommodate the stones.  2 L of warm normal saline solution was used to irrigate out the abdominal cavity and this was sucked free until the effluent was clear.  Final inspection of the liver bed revealed it to be intact with no evidence of hemorrhage or leakage.  The cystic artery and duct stumps were intact with clips applied.  Finally all trochars were removed under direct vision and the air allowed to desufflate.  The fascial defect of the subumbilical port was closed using an  0 Vicryl suture in a figure-of-eight fashion and this was bolstered with a second 0 Vicryl on top of that and reinjected with Marcaine.  The fascial defect of the subxiphoid port was also closed using an 0 Vicryl suture in a figure-of-eight fashion and injected with Marcaine.  All wounds were irrigated with normal saline and the skin closed using 4-0 Vicryl running subcuticular stitches and dressed with Dermabond.  Attention was turned to the left lower quadrant the patient placed in X overlying the palpable mass and a 6 cm transverse incision was made after first injecting with local.  Subcutaneous tissues were dissected down to a firm nodular mass which was densely adhered to the fascia.  This was removed using electrocautery.  The mass was excised in its entirety and appeared to be an endometrioma.  Small amount of bleeding from the cavity was then controlled with electrocautery.  Subcutaneous tissues was approximated with interrupted 3-0 Vicryl sutures and the skin closed using a 4-0 Vicryl running subcuticular stitch and dressed with Dermabond.    Estimated blood loss: 5 mL    IV fluid: 900 mL

## 2020-10-06 NOTE — ANESTHESIA CARE TRANSFER NOTE
Patient: Shirlene Rodriguez    Procedure(s):  laparoscopic cholecystectomy and removal of abdominal wall mass    Diagnosis: Gallstones [K80.20]  Diagnosis Additional Information: No value filed.    Anesthesia Type:   General     Note:  Airway :Face Mask  Patient transferred to:PACU  Handoff Report: Identifed the Patient, Identified the Reponsible Provider, Reviewed the pertinent medical history, Discussed the surgical course, Reviewed Intra-OP anesthesia mangement and issues during anesthesia, Set expectations for post-procedure period and Allowed opportunity for questions and acknowledgement of understanding      Vitals: (Last set prior to Anesthesia Care Transfer)    CRNA VITALS  10/6/2020 0948 - 10/6/2020 1025      10/6/2020             Pulse:  111    SpO2:  100 %    Resp Rate (observed):  (!) 3    EKG:  NSR                Electronically Signed By: COLLIN Osorio CRNA  October 6, 2020  10:25 AM

## 2020-10-06 NOTE — ANESTHESIA PROCEDURE NOTES
Airway   Date/Time: 10/6/2020 9:31 AM   Patient location during procedure: OR    Staff -   Anesthesiologist:  Tre Valenzuela APRN CRNA  Performed By: CRNA    Consent for Airway   Urgency: elective    Indications and Patient Condition  Indications for airway management: laura-procedural  Induction type:intravenousMask difficulty assessment: 1 - vent by mask    Final Airway Details  Final airway type: endotracheal airway  Successful airway:ETT - single  Endotracheal Airway Details   ETT size (mm): 7.0  Cuffed: yes  Successful intubation technique: video laryngoscopy  Grade View of Cords: 1  Adjucts: stylet  Measured from: gums/teeth  Secured at (cm): 22  Secured with: silk tape  Bite block used: None    Post intubation assessment   Placement verified by: capnometry, equal breath sounds and chest rise   Number of attempts at approach: 1  Number of other approaches attempted: 0  Secured with:silk tape  Ease of procedure: easy  Dentition: Intact and Unchanged

## 2020-10-06 NOTE — ANESTHESIA CARE TRANSFER NOTE
Patient: Shirlene Rodriguez    Procedure(s):  laparoscopic cholecystectomy and removal of abdominal wall mass    Diagnosis: Gallstones [K80.20]  Diagnosis Additional Information: No value filed.    Anesthesia Type:   General     Note:  Airway :Nasal Cannula  Patient transferred to:PACU  Handoff Report: Identifed the Patient, Identified the Reponsible Provider, Reviewed the pertinent medical history, Discussed the surgical course, Reviewed Intra-OP anesthesia mangement and issues during anesthesia, Set expectations for post-procedure period and Allowed opportunity for questions and acknowledgement of understanding      Vitals: (Last set prior to Anesthesia Care Transfer)    CRNA VITALS  10/6/2020 0948 - 10/6/2020 1021      10/6/2020             Pulse:  111    SpO2:  100 %    Resp Rate (observed):  (!) 3    EKG:  NSR                Electronically Signed By: COLLIN Grover CRNA  October 6, 2020  10:21 AM

## 2020-10-06 NOTE — ANESTHESIA POSTPROCEDURE EVALUATION
Patient: Shirlene Rodriguez    Procedure(s):  laparoscopic cholecystectomy and removal of abdominal wall mass    Diagnosis:Gallstones [K80.20]  Diagnosis Additional Information: No value filed.    Anesthesia Type:  General    Note:  Anesthesia Post Evaluation    Patient location during evaluation: Phase 2  Patient participation: Able to fully participate in evaluation  Level of consciousness: awake and alert  Pain management: adequate  Airway patency: patent  Cardiovascular status: acceptable and hemodynamically stable  Respiratory status: acceptable, room air and spontaneous ventilation  Hydration status: acceptable  PONV: none     Anesthetic complications: None          Last vitals:  Vitals:    10/06/20 1131 10/06/20 1215 10/06/20 1230   BP: 101/65 105/50 129/68   Pulse:   89   Resp: 16 16 16   Temp:      SpO2:  100% 99%         Electronically Signed By: COLLIN Grover CRNA  October 6, 2020  12:44 PM

## 2020-10-06 NOTE — ANESTHESIA POSTPROCEDURE EVALUATION
Patient: Shirlene Rodriguez    Procedure(s):  laparoscopic cholecystectomy and removal of abdominal wall mass    Diagnosis:Gallstones [K80.20]  Diagnosis Additional Information: No value filed.    Anesthesia Type:  General    Note:  Anesthesia Post Evaluation    Patient location during evaluation: PACU  Patient participation: Able to fully participate in evaluation  Level of consciousness: awake and alert  Pain management: adequate  Airway patency: patent  Cardiovascular status: acceptable and stable  Respiratory status: acceptable and spontaneous ventilation  Hydration status: acceptable  PONV: none     Anesthetic complications: None          Last vitals:  Vitals:    10/06/20 0827 10/06/20 1019   BP: 113/78 127/81   Pulse: 79 93   Resp: 20 16   Temp: 36.7  C (98.1  F) 36.5  C (97.7  F)   SpO2: 100% 99%         Electronically Signed By: COLLIN Osorio CRNA  October 6, 2020  10:27 AM

## 2020-10-07 LAB — COPATH REPORT: NORMAL

## 2020-10-16 ENCOUNTER — OFFICE VISIT (OUTPATIENT)
Dept: SURGERY | Facility: CLINIC | Age: 40
End: 2020-10-16
Payer: COMMERCIAL

## 2020-10-16 VITALS
HEIGHT: 64 IN | BODY MASS INDEX: 26.12 KG/M2 | DIASTOLIC BLOOD PRESSURE: 62 MMHG | HEART RATE: 90 BPM | SYSTOLIC BLOOD PRESSURE: 102 MMHG | WEIGHT: 153 LBS | TEMPERATURE: 96.9 F

## 2020-10-16 DIAGNOSIS — Z09 POSTOP CHECK: Primary | ICD-10-CM

## 2020-10-16 PROCEDURE — 99024 POSTOP FOLLOW-UP VISIT: CPT | Performed by: SURGERY

## 2020-10-16 ASSESSMENT — MIFFLIN-ST. JEOR: SCORE: 1354

## 2020-10-16 NOTE — NURSING NOTE
"Initial /62 (BP Location: Right arm, Patient Position: Sitting, Cuff Size: Adult Regular)   Pulse 90   Temp 96.9  F (36.1  C) (Tympanic)   Ht 1.626 m (5' 4\")   Wt 69.4 kg (153 lb)   LMP 09/30/2020   BMI 26.26 kg/m   Estimated body mass index is 26.26 kg/m  as calculated from the following:    Height as of this encounter: 1.626 m (5' 4\").    Weight as of this encounter: 69.4 kg (153 lb). .    Kristin King MA    "

## 2020-10-16 NOTE — LETTER
"    10/16/2020         RE: Shirlene Rodriguez  [address] [place] [address]        Dear Colleague,    Thank you for referring your patient, Shirlene Rodriguez, to the [facility]. Please see a copy of my visit note below.    No complaints.  Pain controlled with oral pain meds.    /62 (BP Location: Right arm, Patient Position: Sitting, Cuff Size: Adult Regular)   Pulse 90   Temp 96.9  F (36.1  C) (Tympanic)   Ht 1.626 m (5' 4\")   Wt 69.4 kg (153 lb)   LMP 09/30/2020   BMI 26.26 kg/m      Exam  [id number]  CTAB  RRR  S&NTND+BS, wounds - cdi s erythema  No CCE    A/P s/p lap jimi healing well.  No heavy lifting for 2 weeks.  RTC prn.    Dipak Cornelius MD       Again, thank you for allowing me to participate in the care of your patient.        Sincerely,        Dipak Cornelius MD    "

## 2020-10-16 NOTE — PROGRESS NOTES
"No complaints.  Pain controlled with oral pain meds.    /62 (BP Location: Right arm, Patient Position: Sitting, Cuff Size: Adult Regular)   Pulse 90   Temp 96.9  F (36.1  C) (Tympanic)   Ht 1.626 m (5' 4\")   Wt 69.4 kg (153 lb)   LMP 09/30/2020   BMI 26.26 kg/m      Exam  [id number]  CTAB  RRR  S&NTND+BS, wounds - cdi s erythema  No CCE    A/P s/p lap jimi healing well.  No heavy lifting for 2 weeks.  RTC prn.    Dipak Cornelius MD   "

## 2020-10-26 ENCOUNTER — MYC MEDICAL ADVICE (OUTPATIENT)
Dept: FAMILY MEDICINE | Facility: CLINIC | Age: 40
End: 2020-10-26

## 2020-11-06 DIAGNOSIS — F41.9 ANXIETY: ICD-10-CM

## 2020-11-09 RX ORDER — BUSPIRONE HYDROCHLORIDE 10 MG/1
10 TABLET ORAL 3 TIMES DAILY
Qty: 90 TABLET | Refills: 0 | Status: SHIPPED | OUTPATIENT
Start: 2020-11-09 | End: 2020-12-07

## 2020-11-11 DIAGNOSIS — L70.0 ACNE VULGARIS: ICD-10-CM

## 2020-11-11 RX ORDER — DOXYCYCLINE 100 MG/1
CAPSULE ORAL
Qty: 60 CAPSULE | Refills: 1 | Status: CANCELLED | OUTPATIENT
Start: 2020-11-11

## 2020-11-11 NOTE — TELEPHONE ENCOUNTER
"Spoke to patient who did not request the Doxycycline refill. \"My skin is clearing up on the Spironolactone and I really only get breakouts now at that time of the month, so I still have some Doxycyline and I usually just take it for 3 or 4 days and that helps clear it up..\"     I let pharmacy know refill not needed as well. Ingrid Gunter RN    "

## 2020-11-11 NOTE — TELEPHONE ENCOUNTER
Requested Prescriptions   Pending Prescriptions Disp Refills     doxycycline monohydrate (MONODOX) 100 MG capsule 60 capsule 1     Sig: Take one tab BID with food and full glass of water       There is no refill protocol information for this order        Last Written Prescription Date:    Last Fill Quantity: ,  # refills:    Last office visit: 9/21/2020 with prescribing provider:  Sasha Nicole Office Visit:

## 2020-11-13 ENCOUNTER — VIRTUAL VISIT (OUTPATIENT)
Dept: FAMILY MEDICINE | Facility: CLINIC | Age: 40
End: 2020-11-13
Payer: COMMERCIAL

## 2020-11-13 DIAGNOSIS — F41.1 GAD (GENERALIZED ANXIETY DISORDER): Primary | ICD-10-CM

## 2020-11-13 DIAGNOSIS — F41.9 ANXIETY: ICD-10-CM

## 2020-11-13 PROCEDURE — 99214 OFFICE O/P EST MOD 30 MIN: CPT | Mod: TEL | Performed by: PHYSICIAN ASSISTANT

## 2020-11-13 RX ORDER — VENLAFAXINE HYDROCHLORIDE 37.5 MG/1
CAPSULE, EXTENDED RELEASE ORAL
Qty: 159 CAPSULE | Refills: 0 | Status: SHIPPED | OUTPATIENT
Start: 2020-11-13 | End: 2020-12-16

## 2020-11-13 ASSESSMENT — ANXIETY QUESTIONNAIRES
6. BECOMING EASILY ANNOYED OR IRRITABLE: NEARLY EVERY DAY
2. NOT BEING ABLE TO STOP OR CONTROL WORRYING: NEARLY EVERY DAY
5. BEING SO RESTLESS THAT IT IS HARD TO SIT STILL: NOT AT ALL
3. WORRYING TOO MUCH ABOUT DIFFERENT THINGS: NEARLY EVERY DAY
1. FEELING NERVOUS, ANXIOUS, OR ON EDGE: NEARLY EVERY DAY
7. FEELING AFRAID AS IF SOMETHING AWFUL MIGHT HAPPEN: NEARLY EVERY DAY
GAD7 TOTAL SCORE: 15

## 2020-11-13 ASSESSMENT — PATIENT HEALTH QUESTIONNAIRE - PHQ9
SUM OF ALL RESPONSES TO PHQ QUESTIONS 1-9: 7
5. POOR APPETITE OR OVEREATING: NOT AT ALL

## 2020-11-13 NOTE — PROGRESS NOTES
"Shirlene Rodriguez is a 40 year old female who is being evaluated via a billable telephone visit.      The patient has been notified of following:     \"This telephone visit will be conducted via a call between you and your physician/provider. We have found that certain health care needs can be provided without the need for a physical exam.  This service lets us provide the care you need with a short phone conversation.  If a prescription is necessary we can send it directly to your pharmacy.  If lab work is needed we can place an order for that and you can then stop by our lab to have the test done at a later time.    Telephone visits are billed at different rates depending on your insurance coverage. During this emergency period, for some insurers they may be billed the same as an in-person visit.  Please reach out to your insurance provider with any questions.    If during the course of the call the physician/provider feels a telephone visit is not appropriate, you will not be charged for this service.\"    Patient has given verbal consent for Telephone visit?  Yes    What phone number would you like to be contacted at? [phone]    How would you like to obtain your AVS? Lakshmi Barnes     Shirlene Rodriguez is a 40 year old female who presents via phone visit today for the following health issues:    HPI     Anxiety Follow-Up    How are you doing with your depression since your last visit? No change    How are you doing with your anxiety since your last visit?  Worsened, increased anxiety over the last couple months. States that her moms health is deteriorating and worries about COVID    Are you having other symptoms that might be associated with depression or anxiety? No    Have you had a significant life event? OTHER: Moms health concerns and covid     Do you have any concerns with your use of alcohol or other drugs? No    Social History     Tobacco Use     Smoking status: Never Smoker     Smokeless " tobacco: Never Used   Substance Use Topics     Alcohol use: Yes     Comment: rare     Drug use: No     PHQ 10/26/2016 4/1/2019 8/1/2019   PHQ-9 Total Score 5 4 3   Q9: Thoughts of better off dead/self-harm past 2 weeks Not at all Not at all Not at all     VANESSA-7 SCORE 5/6/2014 4/1/2019 8/1/2019   Total Score 6 - -   Total Score - 7 4     Last PHQ-9 11/13/2020   1.  Little interest or pleasure in doing things 1   2.  Feeling down, depressed, or hopeless 0   3.  Trouble falling or staying asleep, or sleeping too much 0   4.  Feeling tired or having little energy 3   5.  Poor appetite or overeating 0   6.  Feeling bad about yourself 0   7.  Trouble concentrating 3   8.  Moving slowly or restless 0   Q9: Thoughts of better off dead/self-harm past 2 weeks 0   PHQ-9 Total Score 7   Difficulty at work, home, or with people -     VANESSA-7  11/13/2020   1. Feeling nervous, anxious, or on edge 3   2. Not being able to stop or control worrying 3   3. Worrying too much about different things 3   4. Trouble relaxing 0   5. Being so restless that it is hard to sit still 0   6. Becoming easily annoyed or irritable 3   7. Feeling afraid, as if something awful might happen 3   VANESSA-7 Total Score 15       Suicide Assessment Five-step Evaluation and Treatment (SAFE-T)    Stress with the world, work, mom (dying of cancer), kids/stress  Talks with sister, , neighbors. Feels she has a pretty good support system  She is using propranolol two times daily  She sleeps well, 8-9 hours of sleep.        last visit:  Change buspirone to 10 mg three times daily  Change propranolol to 20 mg as needed for anxiety    Has been on celexa, zoloft, prozac in the past - worries about weight gain, was more tired on the medications      Review of Systems   Other than noted above, general, HEENT, respiratory, cardiac, MS, and gastrointestinal systems are negative.        Objective          Vitals:  No vitals were obtained today due to virtual  visit.    healthy, alert and no distress  PSYCH: Alert and oriented times 3; coherent speech, normal   rate and volume, able to articulate logical thoughts, able   to abstract reason, no tangential thoughts, no hallucinations   or delusions  Her affect is tearful  RESP: No cough, no audible wheezing, able to talk in full sentences  Remainder of exam unable to be completed due to telephone visits        Assessment/Plan:    Assessment & Plan     ASSESSMENT/PLAN:      ICD-10-CM    1. VANESSA (generalized anxiety disorder)  F41.1 venlafaxine (EFFEXOR-XR) 37.5 MG 24 hr capsule   2. Anxiety  F41.9      We discussed different SSRI vs snri - she would like to try something new. We discussed risks/benefits.     Patient Instructions   Follow up for lab only visit    Start effexor 37.5 mg. You can increase it every week or so to up to 150 mg. You can stay longer at lower doses or stay at the lower dose and not increase.  If you have concerns or side effects, contact me    I'd encourage you to schedule with EAP    Follow up in a month or so to check in    Return in about 1 month (around 12/13/2020) for Virtual Visit (telephone or video).    Gwen Zambrano PA-C  [facility]    Phone call duration:  10 minutes

## 2020-11-13 NOTE — PATIENT INSTRUCTIONS
Follow up for lab only visit    Start effexor 37.5 mg. You can increase it every week or so to up to 150 mg. You can stay longer at lower doses or stay at the lower dose and not increase.  If you have concerns or side effects, contact me    I'd encourage you to schedule with EAP    Follow up in a month or so to check in

## 2020-11-14 ASSESSMENT — ASTHMA QUESTIONNAIRES: ACT_TOTALSCORE: 25

## 2020-11-14 ASSESSMENT — ANXIETY QUESTIONNAIRES: GAD7 TOTAL SCORE: 15

## 2020-12-06 DIAGNOSIS — F41.9 ANXIETY: ICD-10-CM

## 2020-12-07 RX ORDER — BUSPIRONE HYDROCHLORIDE 10 MG/1
10 TABLET ORAL 3 TIMES DAILY
Qty: 90 TABLET | Refills: 0 | Status: SHIPPED | OUTPATIENT
Start: 2020-12-07 | End: 2021-01-04

## 2020-12-07 NOTE — TELEPHONE ENCOUNTER
Prescription approved per [facility] Refill Protocol.  Ladarius Vides RN

## 2020-12-15 ENCOUNTER — MYC MEDICAL ADVICE (OUTPATIENT)
Dept: FAMILY MEDICINE | Facility: CLINIC | Age: 40
End: 2020-12-15

## 2020-12-15 DIAGNOSIS — F41.1 GAD (GENERALIZED ANXIETY DISORDER): ICD-10-CM

## 2020-12-16 RX ORDER — VENLAFAXINE HYDROCHLORIDE 150 MG/1
150 CAPSULE, EXTENDED RELEASE ORAL DAILY
Qty: 90 CAPSULE | Refills: 1 | Status: SHIPPED | OUTPATIENT
Start: 2020-12-16 | End: 2021-06-01

## 2021-01-01 DIAGNOSIS — F41.9 ANXIETY: ICD-10-CM

## 2021-01-04 RX ORDER — BUSPIRONE HYDROCHLORIDE 10 MG/1
10 TABLET ORAL 3 TIMES DAILY
Qty: 270 TABLET | Refills: 0 | Status: SHIPPED | OUTPATIENT
Start: 2021-01-04 | End: 2021-03-30

## 2021-01-04 NOTE — TELEPHONE ENCOUNTER
Medication is being filled for 1 time refill only due to:  Patient needs to be seen because needs follow up dosage change appt..   Ladarius Vides RN

## 2021-03-12 DIAGNOSIS — E61.1 IRON DEFICIENCY: ICD-10-CM

## 2021-03-12 DIAGNOSIS — E03.9 ACQUIRED HYPOTHYROIDISM: ICD-10-CM

## 2021-03-12 LAB
BASOPHILS # BLD AUTO: 0 10E9/L (ref 0–0.2)
BASOPHILS NFR BLD AUTO: 0.2 %
DIFFERENTIAL METHOD BLD: NORMAL
EOSINOPHIL # BLD AUTO: 0.1 10E9/L (ref 0–0.7)
EOSINOPHIL NFR BLD AUTO: 1.1 %
ERYTHROCYTE [DISTWIDTH] IN BLOOD BY AUTOMATED COUNT: 12.5 % (ref 10–15)
FERRITIN SERPL-MCNC: 35 NG/ML (ref 12–150)
HCT VFR BLD AUTO: 43.2 % (ref 35–47)
HGB BLD-MCNC: 14.7 G/DL (ref 11.7–15.7)
LYMPHOCYTES # BLD AUTO: 2.9 10E9/L (ref 0.8–5.3)
LYMPHOCYTES NFR BLD AUTO: 29.6 %
MCH RBC QN AUTO: 30.3 PG (ref 26.5–33)
MCHC RBC AUTO-ENTMCNC: 34 G/DL (ref 31.5–36.5)
MCV RBC AUTO: 89 FL (ref 78–100)
MONOCYTES # BLD AUTO: 0.6 10E9/L (ref 0–1.3)
MONOCYTES NFR BLD AUTO: 5.8 %
NEUTROPHILS # BLD AUTO: 6.1 10E9/L (ref 1.6–8.3)
NEUTROPHILS NFR BLD AUTO: 63.3 %
PLATELET # BLD AUTO: 270 10E9/L (ref 150–450)
RBC # BLD AUTO: 4.85 10E12/L (ref 3.8–5.2)
TSH SERPL DL<=0.005 MIU/L-ACNC: 0.78 MU/L (ref 0.4–4)
WBC # BLD AUTO: 9.7 10E9/L (ref 4–11)

## 2021-03-12 PROCEDURE — 36415 COLL VENOUS BLD VENIPUNCTURE: CPT | Performed by: PHYSICIAN ASSISTANT

## 2021-03-12 PROCEDURE — 82728 ASSAY OF FERRITIN: CPT | Performed by: PHYSICIAN ASSISTANT

## 2021-03-12 PROCEDURE — 84443 ASSAY THYROID STIM HORMONE: CPT | Performed by: PHYSICIAN ASSISTANT

## 2021-03-12 PROCEDURE — 85025 COMPLETE CBC W/AUTO DIFF WBC: CPT | Performed by: PHYSICIAN ASSISTANT

## 2021-03-30 DIAGNOSIS — F41.9 ANXIETY: ICD-10-CM

## 2021-03-30 RX ORDER — BUSPIRONE HYDROCHLORIDE 10 MG/1
TABLET ORAL
Qty: 270 TABLET | Refills: 0 | Status: SHIPPED | OUTPATIENT
Start: 2021-03-30 | End: 2021-06-28

## 2021-03-30 NOTE — TELEPHONE ENCOUNTER
Prescription approved per [facility] Refill Protocol.    Benjamin Cardenas RN

## 2021-03-31 DIAGNOSIS — F41.9 ANXIETY: ICD-10-CM

## 2021-03-31 RX ORDER — BUSPIRONE HYDROCHLORIDE 10 MG/1
TABLET ORAL
Qty: 270 TABLET | Refills: 0 | OUTPATIENT
Start: 2021-03-31

## 2021-04-01 DIAGNOSIS — F41.9 ANXIETY: ICD-10-CM

## 2021-04-01 RX ORDER — BUSPIRONE HYDROCHLORIDE 10 MG/1
TABLET ORAL
Qty: 270 TABLET | Refills: 0 | OUTPATIENT
Start: 2021-04-01

## 2021-06-01 DIAGNOSIS — F41.1 GAD (GENERALIZED ANXIETY DISORDER): ICD-10-CM

## 2021-06-01 RX ORDER — VENLAFAXINE HYDROCHLORIDE 150 MG/1
150 CAPSULE, EXTENDED RELEASE ORAL DAILY
Qty: 90 CAPSULE | Refills: 0 | Status: SHIPPED | OUTPATIENT
Start: 2021-06-01 | End: 2021-08-16

## 2021-06-27 DIAGNOSIS — F41.9 ANXIETY: ICD-10-CM

## 2021-06-28 RX ORDER — BUSPIRONE HYDROCHLORIDE 10 MG/1
10 TABLET ORAL 3 TIMES DAILY
Qty: 90 TABLET | Refills: 0 | Status: SHIPPED | OUTPATIENT
Start: 2021-06-28 | End: 2021-07-29

## 2021-06-28 NOTE — TELEPHONE ENCOUNTER
Medication is being filled for 1 time refill only due to:  Patient needs to be seen because needs follow up.   Ladarius Vides RN

## 2021-08-16 ENCOUNTER — OFFICE VISIT (OUTPATIENT)
Dept: FAMILY MEDICINE | Facility: CLINIC | Age: 41
End: 2021-08-16
Payer: COMMERCIAL

## 2021-08-16 VITALS
TEMPERATURE: 98 F | DIASTOLIC BLOOD PRESSURE: 74 MMHG | HEIGHT: 65 IN | WEIGHT: 174.9 LBS | BODY MASS INDEX: 29.14 KG/M2 | OXYGEN SATURATION: 100 % | HEART RATE: 93 BPM | RESPIRATION RATE: 15 BRPM | SYSTOLIC BLOOD PRESSURE: 115 MMHG

## 2021-08-16 DIAGNOSIS — F41.9 ANXIETY: ICD-10-CM

## 2021-08-16 DIAGNOSIS — F41.1 GAD (GENERALIZED ANXIETY DISORDER): Primary | ICD-10-CM

## 2021-08-16 DIAGNOSIS — R53.83 FATIGUE, UNSPECIFIED TYPE: ICD-10-CM

## 2021-08-16 PROCEDURE — 99214 OFFICE O/P EST MOD 30 MIN: CPT | Performed by: PHYSICIAN ASSISTANT

## 2021-08-16 RX ORDER — LORAZEPAM 0.5 MG/1
0.5 TABLET ORAL EVERY 6 HOURS PRN
Qty: 15 TABLET | Refills: 0 | Status: SHIPPED | OUTPATIENT
Start: 2021-08-16 | End: 2023-09-06

## 2021-08-16 RX ORDER — VENLAFAXINE HYDROCHLORIDE 75 MG/1
225 CAPSULE, EXTENDED RELEASE ORAL DAILY
Qty: 90 CAPSULE | Refills: 3 | Status: SHIPPED | OUTPATIENT
Start: 2021-08-16 | End: 2021-12-09

## 2021-08-16 RX ORDER — BUSPIRONE HYDROCHLORIDE 10 MG/1
TABLET ORAL
Qty: 180 TABLET | Refills: 0 | Status: SHIPPED | OUTPATIENT
Start: 2021-08-16 | End: 2021-09-10

## 2021-08-16 ASSESSMENT — MIFFLIN-ST. JEOR: SCORE: 1456.72

## 2021-08-16 ASSESSMENT — ANXIETY QUESTIONNAIRES
3. WORRYING TOO MUCH ABOUT DIFFERENT THINGS: MORE THAN HALF THE DAYS
5. BEING SO RESTLESS THAT IT IS HARD TO SIT STILL: MORE THAN HALF THE DAYS
1. FEELING NERVOUS, ANXIOUS, OR ON EDGE: NEARLY EVERY DAY
7. FEELING AFRAID AS IF SOMETHING AWFUL MIGHT HAPPEN: MORE THAN HALF THE DAYS
6. BECOMING EASILY ANNOYED OR IRRITABLE: MORE THAN HALF THE DAYS
2. NOT BEING ABLE TO STOP OR CONTROL WORRYING: NEARLY EVERY DAY
GAD7 TOTAL SCORE: 16
IF YOU CHECKED OFF ANY PROBLEMS ON THIS QUESTIONNAIRE, HOW DIFFICULT HAVE THESE PROBLEMS MADE IT FOR YOU TO DO YOUR WORK, TAKE CARE OF THINGS AT HOME, OR GET ALONG WITH OTHER PEOPLE: VERY DIFFICULT

## 2021-08-16 ASSESSMENT — PATIENT HEALTH QUESTIONNAIRE - PHQ9
SUM OF ALL RESPONSES TO PHQ QUESTIONS 1-9: 13
5. POOR APPETITE OR OVEREATING: MORE THAN HALF THE DAYS

## 2021-08-16 NOTE — PROGRESS NOTES
Assessment & Plan     ASSESSMENT/PLAN:      ICD-10-CM    1. VANESSA (generalized anxiety disorder)  F41.1 venlafaxine (EFFEXOR-XR) 75 MG 24 hr capsule   2. Anxiety  F41.9 LORazepam (ATIVAN) 0.5 MG tablet     busPIRone (BUSPAR) 10 MG tablet   3. Fatigue, unspecified type  R53.83 SLEEP EVALUATION & MANAGEMENT REFERRAL - ADULT -     Did briefly discuss wellbutrin, abilify/seroquel as other options.  We did discuss grief/difficult situation, medication may not help as fully as we want it to, she agrees. Discussed counseling, she will think about it.    Patient Instructions   - buspar: gradually increase by 5 mg every few days until you reach maximum 60 mg/day (30 mg two times daily)    You do not need to increase to that maximum dose  - increase to effexor 225 mg (3 of the 75 mg pills)  - lorazepam as needed. Use sparingly.    - consider EAP through [place]    - sleep clinic evaluation at some point for chronic fatigue    Check in with me (hansat, telephone, video) in a month or so      Exercises When you're feeling overwhelmed:  handout    30 minutes spent on the date of the encounter doing chart review, history and exam, documentation and further activities per the note    Return in about 1 month (around 9/16/2021).    Gewn Zambrano PA-C  [facility] BASIA Garber is a 41 year old who presents for the following health issues  accompanied by her daughter:    HPI     Depression and Anxiety Follow-Up    How are you doing with your depression since your last visit? Things are going ok, has been more stressed lately. Mom is not doing well     How are you doing with your anxiety since your last visit?  Worsened,has more stressors     Are you having other symptoms that might be associated with depression or anxiety? Yes:  has been having more lack of concentration over the last 2 months    Have you had a significant life event? OTHER: Mom is not doing well     Do you have any concerns with  "your use of alcohol or other drugs? No    Social History     Tobacco Use     Smoking status: Never Smoker     Smokeless tobacco: Never Used   Substance Use Topics     Alcohol use: Yes     Comment: rare     Drug use: No     PHQ 11/13/2020 3/18/2021 8/16/2021   PHQ-9 Total Score 7 4 13   Q9: Thoughts of better off dead/self-harm past 2 weeks Not at all Not at all Not at all     VANESSA-7 SCORE 11/13/2020 3/18/2021 8/16/2021   Total Score - - -   Total Score - 2 (minimal anxiety) -   Total Score 15 2 16     Suicide Assessment Five-step Evaluation and Treatment (SAFE-T)    Mom is in hospice for lung cancer. Not leaving the bed, not very communicative at this point  Has been very hard. Going there every evening after work        Review of Systems   Other than noted above, general, HEENT, respiratory, cardiac, MS, and gastrointestinal systems are negative.       Objective    /74   Pulse 93   Temp 98  F (36.7  C) (Tympanic)   Resp 15   Ht 1.639 m (5' 4.53\")   Wt 79.3 kg (174 lb 14.4 oz)   SpO2 100%   BMI 29.53 kg/m    Body mass index is 29.53 kg/m .  Physical Exam   GENERAL: healthy, alert and no distress  RESP: lungs clear to auscultation - no rales, rhonchi or wheezes  CV: regular rate and rhythm, normal S1 S2, no S3 or S4, no murmur, click or rub, no peripheral edema and peripheral pulses strong  ABDOMEN: soft, nontender, no hepatosplenomegaly, no masses and bowel sounds normal  PSYCH: Alert and oriented times 3; speech- coherent , normal rate and volume; able to articulate logical thoughts, able to abstract reason, no tangential thoughts, no hallucinations or delusions, affect- normal         "

## 2021-08-16 NOTE — PATIENT INSTRUCTIONS
- buspar: gradually increase by 5 mg every few days until you reach maximum 60 mg/day (30 mg two times daily)    You do not need to increase to that maximum dose  - increase to effexor 225 mg (3 of the 75 mg pills)  - lorazepam as needed. Use sparingly.    - consider EAP through [place]    - sleep clinic evaluation at some point for chronic fatigue    Check in with me (hansat, telephone, video) in a month or so      Exercises When you're feeling overwhelmed:  1. Deep breathing from your diaphragm. Put your hand over your belly if that helps. Breathe in through your nose, hold, out through your mouth   - 4 square breathing into the belly (4 second breathe IN through the nose into the belly, 4 seconds HOLD, 4 second breathe OUT through the mouth,4 seconds HOLD. repeat.) or try 4-7-8 method (4 seconds IN, 7seconds HOLD, 8 seconds OUT). Try shaping your mouth as if you are breathing out through a straw for a long slow exhale.  Visualize breathing in a calming BLUE, allowing that blue to circulate & collect stressful energy, turning PURPLE, and breathing out RED as a release.   2. Muscle tension/relaxation - squeeze your fists/forearms then release them to release tension  3. Grounding yourself. 5-4-3-2-1. 5- things you see, 4- things you feel, 3- things you hear, 2- things you taste/smell, 1- how am I feeling? What's one good thing about myself?  4. Thoughts turn to feelings turn to actions. You'll notice this when a negative thought can make you feel anxious or down, and in turn you act differently.  5. So turn around the negative thought by counteracting it with a positive one. What can the positive side of you say to that negative thought?  6. Think: when I worry I feel like I'm accomplishing something but I'm not. When is a good time to worry, when is a bad time?    Visit www.helpguide.org for stress tools  Visit www.stressWise Data.Media.[employer] for guided meditation  Visit www.adaa.org for anxiety and depression  resources    Good information and free webinars for managing mental health issues: https://adaa.org/understanding-anxiety    [facility] Support groups in the [facility]      Yoga/mindfulness: youtube videos, apps    Mobile Apps :  Conjectur - Headspace - Smiling Mind -  Abide -- Calm -- Shine -- Insight Timer  - these are helpful for daily mindfulness and meditation, as well as sleep meditations to help you fall asleep more quickly and rest better    BetterHelp maureen : counseling sessions online ($)  Woebot: free maureen which trains you in individualized CBT (cognitive behavioral therapy) and mindfulness, and checks in with you

## 2021-08-17 ASSESSMENT — ASTHMA QUESTIONNAIRES: ACT_TOTALSCORE: 25

## 2021-08-17 ASSESSMENT — ANXIETY QUESTIONNAIRES: GAD7 TOTAL SCORE: 16

## 2021-09-02 DIAGNOSIS — F41.1 GAD (GENERALIZED ANXIETY DISORDER): ICD-10-CM

## 2021-09-02 RX ORDER — VENLAFAXINE HYDROCHLORIDE 150 MG/1
150 CAPSULE, EXTENDED RELEASE ORAL DAILY
Qty: 90 CAPSULE | Refills: 0 | OUTPATIENT
Start: 2021-09-02 | End: 2022-03-01

## 2021-09-03 DIAGNOSIS — F41.1 GAD (GENERALIZED ANXIETY DISORDER): ICD-10-CM

## 2021-09-03 RX ORDER — VENLAFAXINE HYDROCHLORIDE 150 MG/1
150 CAPSULE, EXTENDED RELEASE ORAL DAILY
Qty: 90 CAPSULE | Refills: 0 | OUTPATIENT
Start: 2021-09-03 | End: 2022-03-02

## 2021-09-09 DIAGNOSIS — F41.9 ANXIETY: ICD-10-CM

## 2021-09-10 NOTE — TELEPHONE ENCOUNTER
Routing refill request to provider for review/approval because:  PCP to address medication sig    Benjamin Cardenas, RN

## 2021-09-10 NOTE — TELEPHONE ENCOUNTER
Sent message via my chart to clarify dosing and remind of need for follow up end of the month.Lourdes Bowser RN

## 2021-09-10 NOTE — TELEPHONE ENCOUNTER
Please see what dose she is taking. New medication, due for follow up later this month.  Haven Zambrano PA-C

## 2021-09-13 ENCOUNTER — VIRTUAL VISIT (OUTPATIENT)
Dept: FAMILY MEDICINE | Facility: CLINIC | Age: 41
End: 2021-09-13
Payer: COMMERCIAL

## 2021-09-13 DIAGNOSIS — F41.1 GAD (GENERALIZED ANXIETY DISORDER): Primary | ICD-10-CM

## 2021-09-13 PROCEDURE — 99213 OFFICE O/P EST LOW 20 MIN: CPT | Mod: TEL | Performed by: PHYSICIAN ASSISTANT

## 2021-09-13 RX ORDER — BUSPIRONE HYDROCHLORIDE 10 MG/1
TABLET ORAL
Qty: 180 TABLET | Refills: 0 | OUTPATIENT
Start: 2021-09-13

## 2021-09-13 NOTE — PROGRESS NOTES
Shirlene is a 41 year old who is being evaluated via a billable telephone visit.      What phone number would you like to be contacted at? [phone]  How would you like to obtain your [place]S? [facility]    Assessment & Plan     ASSESSMENT/PLAN:      ICD-10-CM    1. VANESSA (generalized anxiety disorder)  F41.1      Doing well with anxiety. Processing through grief. Good support system. Discussed EAP counseling if needed.    Patient Instructions   Continue current medications    Return in about 6 months (around 3/13/2022).    Gwen Zambrano PA-C  [facility]    Molly Garber is a 41 year old who presents for the following health issues     HPI     Depression and Anxiety Follow-Up    How are you doing with your depression since your last visit? Improved, things are going really well on current dose    How are you doing with your anxiety since your last visit?  Improved going well since increasing medication    Are you having other symptoms that might be associated with depression or anxiety? No    Have you had a significant life event? No     Do you have any concerns with your use of alcohol or other drugs? No    Social History     Tobacco Use     Smoking status: Never Smoker     Smokeless tobacco: Never Used   Substance Use Topics     Alcohol use: Yes     Comment: rare     Drug use: No     PHQ 3/18/2021 2021 2021   PHQ-9 Total Score 4 13 9   Q9: Thoughts of better off dead/self-harm past 2 weeks Not at all Not at all Not at all     VANESSA-7 SCORE 3/18/2021 2021 2021   Total Score - - -   Total Score 2 (minimal anxiety) - 13 (moderate anxiety)   Total Score 2 16 13     Suicide Assessment Five-step Evaluation and Treatment (SAFE-T)    Increased effexor, added buspirone  Mom  21, long murphy with cancer  Good support system      Review of Systems   Other than noted above, general, HEENT, respiratory, cardiac, MS, and gastrointestinal systems are negative.       Objective            Vitals:  No vitals were obtained today due to virtual visit.    Physical Exam   healthy, alert and no distress  PSYCH: Alert and oriented times 3; coherent speech, normal   rate and volume, able to articulate logical thoughts, able   to abstract reason, no tangential thoughts, no hallucinations   or delusions  Her affect is normal  RESP: No cough, no audible wheezing, able to talk in full sentences  Remainder of exam unable to be completed due to telephone visits        Phone call duration: 8 minutes

## 2021-09-13 NOTE — TELEPHONE ENCOUNTER
busPIRone (BUSPAR) 30 MG tablet 180 tablet 1 9/10/2021  No   Sig - Route: Take 1 tablet (30 mg) by mouth 2 times daily - Oral   Sent to pharmacy as: busPIRone HCl 30 MG Oral Tablet (BUSPAR)   Class: E-Prescribe   Order: [id number]   E-Prescribing Status: Receipt confirmed by pharmacy (9/10/2021  4:02 PM CDT)   Printout Tracking    External Result Report   Pharmacy    [facility] [place], [place] - [address]

## 2021-09-19 ENCOUNTER — HEALTH MAINTENANCE LETTER (OUTPATIENT)
Age: 41
End: 2021-09-19

## 2021-09-29 ENCOUNTER — VIRTUAL VISIT (OUTPATIENT)
Dept: SLEEP MEDICINE | Facility: CLINIC | Age: 41
End: 2021-09-29
Attending: PHYSICIAN ASSISTANT
Payer: COMMERCIAL

## 2021-09-29 VITALS — BODY MASS INDEX: 29.16 KG/M2 | HEIGHT: 65 IN | WEIGHT: 175 LBS

## 2021-09-29 DIAGNOSIS — G47.10 HYPERSOMNIA: Primary | ICD-10-CM

## 2021-09-29 DIAGNOSIS — R51.9 SLEEP RELATED HEADACHES: ICD-10-CM

## 2021-09-29 DIAGNOSIS — G47.63 SLEEP RELATED BRUXISM: ICD-10-CM

## 2021-09-29 DIAGNOSIS — G47.61 PERIODIC LIMB MOVEMENT: ICD-10-CM

## 2021-09-29 PROCEDURE — 99203 OFFICE O/P NEW LOW 30 MIN: CPT | Mod: 95 | Performed by: INTERNAL MEDICINE

## 2021-09-29 ASSESSMENT — MIFFLIN-ST. JEOR: SCORE: 1464.67

## 2021-09-29 NOTE — PATIENT INSTRUCTIONS
Your BMI is Body mass index is 29.12 kg/m .  Weight management is a personal decision.  If you are interested in exploring weight loss strategies, the following discussion covers the approaches that may be successful. Body mass index (BMI) is one way to tell whether you are at a healthy weight, overweight, or obese. It measures your weight in relation to your height.  A BMI of 18.5 to 24.9 is in the healthy range. A person with a BMI of 25 to 29.9 is considered overweight, and someone with a BMI of 30 or greater is considered obese. More than two-thirds of American adults are considered overweight or obese.  Being overweight or obese increases the risk for further weight gain. Excess weight may lead to heart disease and diabetes.  Creating and following plans for healthy eating and physical activity may help you improve your health.  Weight control is part of healthy lifestyle and includes exercise, emotional health, and healthy eating habits. Careful eating habits lifelong are the mainstay of weight control. Though there are significant health benefits from weight loss, long-term weight loss with diet alone may be very difficult to achieve- studies show long-term success with dietary management in less than 10% of people. Attaining a healthy weight may be especially difficult to achieve in those with severe obesity. In some cases, medications, devices and surgical management might be considered.  What can you do?  If you are overweight or obese and are interested in methods for weight loss, you should discuss this with your provider.     Consider reducing daily calorie intake by 500 calories.     Keep a food journal.     Avoiding skipping meals, consider cutting portions instead.    Diet combined with exercise helps maintain muscle while optimizing fat loss. Strength training is particularly important for building and maintaining muscle mass. Exercise helps reduce stress, increase energy, and improves fitness.  Increasing exercise without diet control, however, may not burn enough calories to loose weight.       Start walking three days a week 10-20 minutes at a time    Work towards walking thirty minutes five days a week     Eventually, increase the speed of your walking for 1-2 minutes at time    In addition, we recommend that you review healthy lifestyles and methods for weight loss available through the National Institutes of Health patient information sites:  http://win.niddk.nih.gov/publications/index.htm    And look into health and wellness programs that may be available through your health insurance provider, employer, local community center, or maliha club.    Weight management plan: Patient was referred to their PCP to discuss a diet and exercise plan.  Patient education: What is a sleep study?     What is a sleep study? -- A sleep study is a test that measures how well you sleep and checks for sleep problems. For some sleep studies, you stay overnight in a sleep lab at a hospital or sleep center.     What happens during a sleep study? -- Before you go to sleep, a technician attaches small, sticky patches called  electrodes  to your head, chest, and legs. He or she will also place a small tube beneath your nose and might wrap 1 or 2 belts around your chest.   Each of these items has wires that connect to monitors. The monitors record your movement, brain activity, breathing, and other body functions while you sleep.  If you have a history of trouble falling asleep, your doctor might prescribe a medicine to help you fall asleep in the lab. If you have never taken the medicine before, your doctor might ask you take it on a night before your sleep study to see how it affects you.   Why might my doctor order a sleep study? -- Your doctor will order a sleep study if he or she thinks you have sleep apnea or a different condition that makes you:   ?Have sudden jerking leg movements while you sleep, called  periodic limb  movements.    ?Feel very sleepy during the day and fall asleep all of a sudden, called  narcolepsy.    ?Have trouble falling asleep or staying asleep over a long period of time, called  chronic insomnia.    ?Do odd things while you sleep, such as walking.  How should I prepare for a sleep study? -- On the day of your sleep study, you should:   ?Avoid alcohol   ?Avoid drinking coffee, tea, sodas, and other drinks that have caffeine in the afternoon and evening   ?Take all of your regular medicines     The cost of care estimate line is [phone]. They are able to give the patient an estimate of the charges and also an estimate of their insurance coverage/patient responsibility.   After your sleep study is performed, please call us at [phone] or [phone]  to schedule for a follow up to review the results of the sleep study.    Please bring one tab of low dose melatonin 3 mg or less to the night of the study.    Melatonin intake is completely voluntary.    You may take own melatonin after arrival to sleep center. Do not drive or operate machinery after intake of melatonin.

## 2021-09-29 NOTE — PROGRESS NOTES
Shirlene is a 41 year old who is being evaluated via a billable video visit.      How would you like to obtain your AVS? MyChart  If the video visit is dropped, the invitation should be resent by: Text to cell phone: [phone]  Will anyone else be joining your video visit? No      Video Start Time: 10:50 AM  Video-Visit Details    Type of service:  Video Visit    Video End Time:11:03 AM    Originating Location (pt. Location): Home    Distant Location (provider location):  [facility]     Platform used for Video Visit: AmWell  Additional 15 minutes was spent performing the following:    -Preparing to see the patient  -Ordering medications, tests, or procedures   -Documenting clinical information in the electronic or other health record     Thank you for the opportunity to participate in the care of  Shirlene Rodriguez.    Assessment and Plan:    In summary Shirlene Rodriguez is a 41 year old year old female here for sleep disturbance.  1. Hypersomnia/Sleep related bruxism/Sleep related headaches/Periodic limb movement   The cause of the patient's symptoms may be multifactorial in nature. I propose that we proceed with an in lab.  Study to delineate the possible causes of her symptom.    History of present illness:    She is a 41 year old female who comes to the virtual clinic with a chief complaint of excessive daytime sleepiness that has been going on for more than 20 years.  While the patient denies any episodes of witnessed apnea or snoring she has been told that she does grind her teeth during sleep.  She also frequently wakes up with headaches and twitches her limbs when she is asleep.     Ideal Sleep-Wake Cycle(devoid of societal pressure):    Patient would try to initiate sleep at around 8-9 PM with a sleep latency of less than 10 minutes. The patient would have 0 awakenings. Final wake up time is around 9 AM.    EMELY:  EMELY Total Score: 8  Total score - Schuyler: 17 (9/29/2021 10:00  AM)    Patient told to return in one week after the sleep study is interpreted.    Patient Active Problem List   Diagnosis     VANESSA (generalized anxiety disorder)     GERD (gastroesophageal reflux disease)     History of      Acne     Acquired hypothyroidism     mirena IUD 2020     Gallstones     Past Medical History:   Diagnosis Date     Abnormal Pap smear of cervix 2011     Acne      Allergies      Anxiety state, unspecified 2005     Major depression in complete remission (H) 2012     Papanicolaou smear of cervix with low grade squamous intraepithelial lesion (LGSIL)     HPV 16 - high risk - colp negative     Unspecified asthma(493.90)      Unspecified hypothyroidism 2005     Past Surgical History:   Procedure Laterality Date     CARDIAC STRESS TST,COMPLETE      Normal      SECTION  2012    Procedure:  SECTION;   SECTION;  Surgeon: Warner Ac MD;  Location: [facility]      SECTION, TUBAL LIGATION, COMBINED N/A 2016    Procedure: COMBINED  SECTION, TUBAL LIGATION;  Surgeon: Warner Ac MD;  Location: [facility]     GYN SURGERY  2012    C section     LAPAROSCOPIC CHOLECYSTECTOMY N/A 10/6/2020    Procedure: laparoscopic cholecystectomy and removal of abdominal wall mass;  Surgeon: Dipak Cornelius MD;  Location: [facility]     SURGICAL HISTORY OF -       root canal     Current Outpatient Medications   Medication Sig Dispense Refill     busPIRone (BUSPAR) 30 MG tablet Take 1 tablet (30 mg) by mouth 2 times daily 180 tablet 1     levonorgestrel (MIRENA) 20 MCG/24HR IUD 1 each (20 mcg) by Intrauterine route once       levothyroxine (SYNTHROID/LEVOTHROID) 100 MCG tablet Take 1 tablet (100 mcg) by mouth daily 90 tablet 3     LORazepam (ATIVAN) 0.5 MG tablet Take 1 tablet (0.5 mg) by mouth every 6 hours as needed for anxiety 15 tablet 0     oxymetazoline HCl (RHOFADE) 1 % cream Apply a pea-sized amount once daily to the  entire face or affected area avoiding the eyes and lips. Wash hands immediately after applying. 30 g 11     propranolol (INDERAL) 20 MG tablet Take 1 tablet (20 mg) by mouth 2 times daily as needed (anxiety) 30 tablet 1     spironolactone (ALDACTONE) 100 MG tablet 1 tab po daily 90 tablet 3     spironolactone (ALDACTONE) 50 MG tablet Take 1 tablet (50 mg) by mouth daily 90 tablet 3     tretinoin (RETIN-A) 0.025 % external cream Apply pea size amount to entire face QD 45 g 11     venlafaxine (EFFEXOR-XR) 75 MG 24 hr capsule Take 3 capsules (225 mg) by mouth daily 90 capsule 3     No known allergies  Social History     Socioeconomic History     Marital status:      Spouse name: Not on file     Number of children: Not on file     Years of education: 14     Highest education level: Not on file   Occupational History     Occupation: Pharmacy [employer]     Employer: [employer]   Tobacco Use     Smoking status: Never Smoker     Smokeless tobacco: Never Used   Substance and Sexual Activity     Alcohol use: Yes     Comment: rare     Drug use: No     Sexual activity: Yes     Partners: Male     Birth control/protection: Female Surgical     Comment: tubal ligation   Other Topics Concern      Service No     Blood Transfusions No     Caffeine Concern Yes     Comment: Advised not more than 200 mg/day     Occupational Exposure Yes     Comment: Pharmacy tech/IT     Hobby Hazards Not Asked     Sleep Concern No     Stress Concern Yes     Comment: anxiety     Weight Concern No     Special Diet No     Back Care No     Exercise Yes     Comment: Advised walking 30 minutes/day     Bike Helmet Not Asked     Seat Belt Yes     Self-Exams Yes     Parent/sibling w/ CABG, MI or angioplasty before 65F 55M? Not Asked   Social History Narrative     to Jose De Jesus and lives in [place] with their daughter, Lulu.  No smokers in the home.  Has one indoor cat.  Advised about toxoplasmosis precautions.   No concerns about  domestic violence.       Social Determinants of Health     Financial Resource Strain:      Difficulty of Paying Living Expenses:    Food Insecurity:      Worried About Running Out of Food in the Last Year:      Ran Out of Food in the Last Year:    Transportation Needs:      Lack of Transportation (Medical):      Lack of Transportation (Non-Medical):    Physical Activity:      Days of Exercise per Week:      Minutes of Exercise per Session:    Stress:      Feeling of Stress :    Social Connections:      Frequency of Communication with Friends and Family:      Frequency of Social Gatherings with Friends and Family:      Attends Confucianist Services:      Active Member of Clubs or Organizations:      Attends Club or Organization Meetings:      Marital Status:    Intimate Partner Violence:      Fear of Current or Ex-Partner:      Emotionally Abused:      Physically Abused:      Sexually Abused:      Family History   Problem Relation Age of Onset     Alcohol/Drug Mother         alcoholic     Pulmonary Embolism Mother      Prostate Cancer Father 65     Alzheimer Disease Paternal Grandfather      Myocardial Infarction Paternal Grandmother         Physical Exam:  GEN: NAD,   Head: Normocephalic.  EYES: EOMI  ENT: Oropharynx is clear, Lu class 4+ airway.   Psych: normal mood, normal affect  Snore test:(+)     Labs/Studies:     No results found for: PH, PHARTERIAL, PO2, [id number], SAT, PCO2, HCO3, BASEEXCESS, KENNY, BEB  Lab Results   Component Value Date    TSH 0.78 03/12/2021    TSH 0.39 (L) 09/11/2020     Lab Results   Component Value Date    GLC 84 09/11/2020    GLC 74 04/01/2019     Lab Results   Component Value Date    HGB 14.7 03/12/2021    HGB 15.9 (H) 09/11/2020     Lab Results   Component Value Date    BUN 10 09/11/2020    BUN 9 04/01/2019    CR 0.92 09/11/2020    CR 0.83 04/01/2019     Lab Results   Component Value Date    AST 23 09/11/2020    AST 20 03/22/2007    ALT 38 09/11/2020    ALT 17 03/22/2007     ALKPHOS 72 09/11/2020    ALKPHOS 75 03/22/2007    BILITOTAL 0.7 09/11/2020    BILITOTAL 0.2 03/22/2007    BILICONJ 0.0 03/22/2007     No results found for: UAMP, UBARB, BENZODIAZEUR, UCANN, UCOC, OPIT, UPCP    Recent Labs   Lab Test 09/11/20  1202 04/01/19  1809    139   POTASSIUM 4.5 4.2   CHLORIDE 102 104   CO2 28 29   ANIONGAP 5 6   GLC 84 74   BUN 10 9   CR 0.92 0.83   GAVIN 9.2 8.9       Ferritin   Date Value Ref Range Status   03/12/2021 35 12 - 150 ng/mL Final       Stephan Mccall DO  Board Certified in Internal Medicine and Sleep Medicine    (Note created with Dragon voice recognition and unintended spelling errors and word substitutions may occur)

## 2021-10-08 DIAGNOSIS — L70.0 ACNE VULGARIS: ICD-10-CM

## 2021-10-08 RX ORDER — TRETINOIN 0.25 MG/G
CREAM TOPICAL
Qty: 45 G | Refills: 2 | Status: SHIPPED | OUTPATIENT
Start: 2021-10-08 | End: 2022-01-27

## 2021-10-08 NOTE — LETTER
[facility]  [address] [place] [address]  Phone: [phone]       October 8, 2021         Shirlene Rodriguez  [address] [place] [address]            Dear Shirlene:    We are concerned about your health care.  We recently provided you with medication refills.  Many medications require routine follow-up with your doctor in Dermatology.    Your prescription(s) have been refilled for 3 months so you may have time for the above noted follow-up. Please call to schedule soon so we can assure you have an appointment before your next refills are needed. Please call [phone] as we are scheduling out a couple of months right now.     Thank you,      Sasha BRADLEY / tr

## 2021-10-08 NOTE — TELEPHONE ENCOUNTER
Requested Prescriptions   Pending Prescriptions Disp Refills     tretinoin (RETIN-A) 0.025 % external cream 45 g 11     Sig: Apply pea size amount to entire face QD       There is no refill protocol information for this order        Last office visit: 9/21/2020 with prescribing provider:  VANESSA DEE   Future Office Visit:          Randee Haywood  [facility]

## 2021-10-08 NOTE — TELEPHONE ENCOUNTER
Medication is being filled for 1 time refill only due to:  Patient needs to be seen because it has been more than one year since last visit. Letter and mychart sent to pt to make appt.  Liliana CROWELL RN BSN PHN  [facility]

## 2021-11-14 ENCOUNTER — HEALTH MAINTENANCE LETTER (OUTPATIENT)
Age: 41
End: 2021-11-14

## 2021-11-22 DIAGNOSIS — L70.0 ACNE VULGARIS: ICD-10-CM

## 2021-11-22 NOTE — TELEPHONE ENCOUNTER
Requested Prescriptions   Pending Prescriptions Disp Refills     spironolactone (ALDACTONE) 100 MG tablet 90 tablet 3     Si tab po daily       There is no refill protocol information for this order        spironolactone (ALDACTONE) 50 MG tablet 90 tablet 3     Sig: Take 1 tablet (50 mg) by mouth daily       There is no refill protocol information for this order        Last office visit: 2020 with prescribing provider:  VANESSA DEE    Future Office Visit:          Randee Haywood  [facility]

## 2021-11-23 RX ORDER — SPIRONOLACTONE 50 MG/1
50 TABLET, FILM COATED ORAL DAILY
Qty: 90 TABLET | Refills: 0 | Status: SHIPPED | OUTPATIENT
Start: 2021-11-23 | End: 2022-01-27

## 2021-11-23 RX ORDER — SPIRONOLACTONE 100 MG/1
TABLET, FILM COATED ORAL
Qty: 90 TABLET | Refills: 0 | Status: SHIPPED | OUTPATIENT
Start: 2021-11-23 | End: 2022-01-27

## 2021-11-23 NOTE — TELEPHONE ENCOUNTER
Spoke to patient and advised of need to be seen annually, (was notified of need in October per chart review)    Scheduled and refilled for 3 months per protocol. Ingrid Gunter RN

## 2021-12-09 DIAGNOSIS — F41.1 GAD (GENERALIZED ANXIETY DISORDER): ICD-10-CM

## 2021-12-09 RX ORDER — VENLAFAXINE HYDROCHLORIDE 75 MG/1
225 CAPSULE, EXTENDED RELEASE ORAL DAILY
Qty: 90 CAPSULE | Refills: 3 | Status: SHIPPED | OUTPATIENT
Start: 2021-12-09 | End: 2022-03-30

## 2022-01-27 ENCOUNTER — OFFICE VISIT (OUTPATIENT)
Dept: DERMATOLOGY | Facility: CLINIC | Age: 42
End: 2022-01-27
Payer: COMMERCIAL

## 2022-01-27 VITALS — SYSTOLIC BLOOD PRESSURE: 114 MMHG | HEART RATE: 95 BPM | DIASTOLIC BLOOD PRESSURE: 80 MMHG | OXYGEN SATURATION: 99 %

## 2022-01-27 DIAGNOSIS — L82.1 SEBORRHEIC KERATOSIS: ICD-10-CM

## 2022-01-27 DIAGNOSIS — L70.0 ACNE VULGARIS: ICD-10-CM

## 2022-01-27 DIAGNOSIS — D18.01 ANGIOMA OF SKIN: ICD-10-CM

## 2022-01-27 DIAGNOSIS — L71.9 ROSACEA: Primary | ICD-10-CM

## 2022-01-27 DIAGNOSIS — L81.4 LENTIGO: ICD-10-CM

## 2022-01-27 DIAGNOSIS — D22.9 NEVUS: ICD-10-CM

## 2022-01-27 PROCEDURE — 99214 OFFICE O/P EST MOD 30 MIN: CPT | Performed by: PHYSICIAN ASSISTANT

## 2022-01-27 RX ORDER — SPIRONOLACTONE 50 MG/1
50 TABLET, FILM COATED ORAL DAILY
Qty: 90 TABLET | Refills: 3 | Status: SHIPPED | OUTPATIENT
Start: 2022-01-27 | End: 2023-02-14

## 2022-01-27 RX ORDER — SPIRONOLACTONE 100 MG/1
TABLET, FILM COATED ORAL
Qty: 90 TABLET | Refills: 3 | Status: SHIPPED | OUTPATIENT
Start: 2022-01-27 | End: 2023-02-14

## 2022-01-27 RX ORDER — TRETINOIN 0.25 MG/G
CREAM TOPICAL
Qty: 45 G | Refills: 11 | Status: SHIPPED | OUTPATIENT
Start: 2022-01-27 | End: 2023-08-17

## 2022-01-27 RX ORDER — BRIMONIDINE TARTRATE 5 MG/G
GEL TOPICAL
Qty: 30 G | Refills: 5 | Status: SHIPPED | OUTPATIENT
Start: 2022-01-27 | End: 2022-05-18

## 2022-01-27 NOTE — LETTER
1/27/2022         RE: Shirlene Rodriguez  [address] [place] [address]        Dear Colleague,    Thank you for referring your patient, Shirlene Rodriguez, to the [facility]. Please see a copy of my visit note below.    HPI:   Shirlene Rodriguez is a 42 year old female who presents for Full skin cancer screening and for recheck of acne on the face. She still makes an occasional deep pimple. Rhofade was working well for her redness, but then it stopped working. She is using tretinoin cream every day.    Last Skin Exam: 1 year ago      1st Baseline: No  Personal HX of Skin Cancer: no   Personal HX of Malignant Melanoma: no   Family HX of Skin Cancer / Malignant Melanoma: no  Personal HX of Atypical Moles:  Yes moderately atypical nevus 2018  Risk factors: regular sun exposure  New / Changing lesions: no  Social History: works at [employer]  On review of systems, there are no further skin complaints, patient is feeling otherwise well.  See patient intake sheet.  ROS of the following were done and are negative: Constitutional, Eyes, Ears, Nose,   Mouth, Throat, Cardiovascular, Respiratory, GI, Genitourinary, Musculoskeletal,   Psychiatric, Endocrine, Allergic/Immunologic.      PHYSICAL EXAM:   /80 (BP Location: Left arm)   Pulse 95   SpO2 99%   Skin exam performed as follows: Type 2 skin. Mood appropriate  Alert and Oriented X 3. Well developed, well nourished in no distress.  General appearance: Normal  Head including face: Normal  Eyes: conjunctiva and lids: Normal  Mouth: Lips, teeth, gums: Normal  Neck: Normal  Chest-breast/axillae: Normal  Back: Normal  Spleen and liver: Normal  Cardiovascular: Exam of peripheral vascular system by observation for swelling, varicosities, edema: Normal  Genitalia: groin, buttocks: Normal  Extremities: digits/nails (clubbing): Normal  Eccrine and Apocrine glands: Normal  Right upper extremity: Normal  Left upper extremity: Normal  Right lower  extremity: Normal  Left lower extremity: Normal  Skin: Scalp and body hair: See below    Pt deferred exam of breasts, groin, buttocks: No    Other physical findings:  1. Multiple pigmented macules on extremities and trunk  2. Multiple pigmented macules on face, trunk and extremities  3. Multiple vascular papules on trunk, arms and legs  4. Multiple scattered keratotic plaques  5. Background erythema; telangiectasias on the nose       Except as noted above, no other signs of skin cancer or melanoma.     ASSESSMENT/PLAN:   Benign Full skin cancer screening today. Patient with history of a moderately atypical nevus in 2018  Advised on monthly self exams and 1 year  Patient Education: Appropriate brochures given.    Multiple benign appearing nevi on arms, legs and trunk. Discussed ABCDEs of melanoma and sunscreen.   Multiple lentigos on arms, legs and trunk. Advised benign, no treatment needed.  Multiple scattered angiomas. Advised benign, no treatment needed.   Seborrheic keratosis on arms, legs and trunk. Advised benign, no treatment needed.  Background redness; some telangiectasias around the nose. Briefly discussed laser for this but that we have not yet been trained on our new laser. Will discuss again in the future.   Acne Vulgaris - Doing well on spironolactone and tretinoin; would like to control background redness.   --Continue spironolactone 150 mg daily. Advised on potential for hypotension, teratogenetic effects, menstrual irregularities, hyperkalemia and need for Q 6 month K+ checks.  --Start Mirvaso QAM - warned about potential for rebound redness initially but that this should resolve with continued use.   --Continue tretinoin QHS        Follow-up: yearly if doing well/PRN sooner    1.) Patient was asked about new and changing moles. YES  2.) Patient received a complete physical skin examination: YES  3.) Patient was counseled to perform a monthly self skin examination: YES  Scribed By: Sasha Hughes  RADHA HARTMAN        Again, thank you for allowing me to participate in the care of your patient.        Sincerely,        Sasha Patel PA-C

## 2022-01-27 NOTE — PROGRESS NOTES
HPI:   Shirlene Rodriguez is a 42 year old female who presents for Full skin cancer screening and for recheck of acne on the face. She still makes an occasional deep pimple. Rhofade was working well for her redness, but then it stopped working. She is using tretinoin cream every day.    Last Skin Exam: 1 year ago      1st Baseline: No  Personal HX of Skin Cancer: no   Personal HX of Malignant Melanoma: no   Family HX of Skin Cancer / Malignant Melanoma: no  Personal HX of Atypical Moles:  Yes moderately atypical nevus 2018  Risk factors: regular sun exposure  New / Changing lesions: no  Social History: works at [employer]  On review of systems, there are no further skin complaints, patient is feeling otherwise well.  See patient intake sheet.  ROS of the following were done and are negative: Constitutional, Eyes, Ears, Nose,   Mouth, Throat, Cardiovascular, Respiratory, GI, Genitourinary, Musculoskeletal,   Psychiatric, Endocrine, Allergic/Immunologic.      PHYSICAL EXAM:   /80 (BP Location: Left arm)   Pulse 95   SpO2 99%   Skin exam performed as follows: Type 2 skin. Mood appropriate  Alert and Oriented X 3. Well developed, well nourished in no distress.  General appearance: Normal  Head including face: Normal  Eyes: conjunctiva and lids: Normal  Mouth: Lips, teeth, gums: Normal  Neck: Normal  Chest-breast/axillae: Normal  Back: Normal  Spleen and liver: Normal  Cardiovascular: Exam of peripheral vascular system by observation for swelling, varicosities, edema: Normal  Genitalia: groin, buttocks: Normal  Extremities: digits/nails (clubbing): Normal  Eccrine and Apocrine glands: Normal  Right upper extremity: Normal  Left upper extremity: Normal  Right lower extremity: Normal  Left lower extremity: Normal  Skin: Scalp and body hair: See below    Pt deferred exam of breasts, groin, buttocks: No    Other physical findings:  1. Multiple pigmented macules on extremities and trunk  2. Multiple pigmented macules on  face, trunk and extremities  3. Multiple vascular papules on trunk, arms and legs  4. Multiple scattered keratotic plaques  5. Background erythema; telangiectasias on the nose       Except as noted above, no other signs of skin cancer or melanoma.     ASSESSMENT/PLAN:   Benign Full skin cancer screening today. Patient with history of a moderately atypical nevus in 2018  Advised on monthly self exams and 1 year  Patient Education: Appropriate brochures given.    Multiple benign appearing nevi on arms, legs and trunk. Discussed ABCDEs of melanoma and sunscreen.   Multiple lentigos on arms, legs and trunk. Advised benign, no treatment needed.  Multiple scattered angiomas. Advised benign, no treatment needed.   Seborrheic keratosis on arms, legs and trunk. Advised benign, no treatment needed.  Background redness; some telangiectasias around the nose. Briefly discussed laser for this but that we have not yet been trained on our new laser. Will discuss again in the future.   Acne Vulgaris - Doing well on spironolactone and tretinoin; would like to control background redness.   --Continue spironolactone 150 mg daily. Advised on potential for hypotension, teratogenetic effects, menstrual irregularities, hyperkalemia and need for Q 6 month K+ checks.  --Start Mirvaso QAM - warned about potential for rebound redness initially but that this should resolve with continued use.   --Continue tretinoin QHS        Follow-up: yearly if doing well/PRN sooner    1.) Patient was asked about new and changing moles. YES  2.) Patient received a complete physical skin examination: YES  3.) Patient was counseled to perform a monthly self skin examination: YES  Scribed By: Sasha Hughes MS, PAHueyC

## 2022-01-27 NOTE — NURSING NOTE
"Initial /80 (BP Location: Left arm)   Pulse 95   SpO2 99%  Estimated body mass index is 29.12 kg/m  as calculated from the following:    Height as of 9/29/21: 1.651 m (5' 5\").    Weight as of 9/29/21: 79.4 kg (175 lb). .      "

## 2022-03-03 DIAGNOSIS — F41.9 ANXIETY: ICD-10-CM

## 2022-03-03 DIAGNOSIS — E03.9 ACQUIRED HYPOTHYROIDISM: ICD-10-CM

## 2022-03-03 RX ORDER — LEVOTHYROXINE SODIUM 100 UG/1
100 TABLET ORAL DAILY
Qty: 90 TABLET | Refills: 0 | Status: SHIPPED | OUTPATIENT
Start: 2022-03-03 | End: 2022-05-19

## 2022-03-03 RX ORDER — BUSPIRONE HYDROCHLORIDE 30 MG/1
TABLET ORAL
Qty: 180 TABLET | Refills: 0 | Status: SHIPPED | OUTPATIENT
Start: 2022-03-03 | End: 2022-05-18

## 2022-04-24 DIAGNOSIS — F41.1 GAD (GENERALIZED ANXIETY DISORDER): ICD-10-CM

## 2022-04-25 RX ORDER — VENLAFAXINE HYDROCHLORIDE 75 MG/1
CAPSULE, EXTENDED RELEASE ORAL
Qty: 90 CAPSULE | Refills: 0 | Status: SHIPPED | OUTPATIENT
Start: 2022-04-25 | End: 2022-05-18

## 2022-04-25 NOTE — TELEPHONE ENCOUNTER
Routing refill request to provider for review/approval because:  Labs not current:  Creatinine > year    Benjamin Cardenas RN

## 2022-05-16 ASSESSMENT — ENCOUNTER SYMPTOMS
DIZZINESS: 0
HEADACHES: 0
COUGH: 0
NERVOUS/ANXIOUS: 0
BREAST MASS: 0
JOINT SWELLING: 1
DIARRHEA: 0
HEARTBURN: 0
FEVER: 0
EYE PAIN: 0
MYALGIAS: 1
HEMATURIA: 0
NAUSEA: 0
SHORTNESS OF BREATH: 0
PARESTHESIAS: 0
SORE THROAT: 0
CONSTIPATION: 0
PALPITATIONS: 0
DYSURIA: 0
CHILLS: 0
HEMATOCHEZIA: 0
FREQUENCY: 0
ARTHRALGIAS: 1
WEAKNESS: 0
ABDOMINAL PAIN: 0

## 2022-05-18 ENCOUNTER — OFFICE VISIT (OUTPATIENT)
Dept: FAMILY MEDICINE | Facility: CLINIC | Age: 42
End: 2022-05-18
Payer: COMMERCIAL

## 2022-05-18 VITALS
DIASTOLIC BLOOD PRESSURE: 77 MMHG | HEIGHT: 65 IN | OXYGEN SATURATION: 99 % | TEMPERATURE: 98.7 F | SYSTOLIC BLOOD PRESSURE: 122 MMHG | RESPIRATION RATE: 16 BRPM | HEART RATE: 92 BPM | WEIGHT: 195.2 LBS | BODY MASS INDEX: 32.52 KG/M2

## 2022-05-18 DIAGNOSIS — L50.9 URTICARIA: ICD-10-CM

## 2022-05-18 DIAGNOSIS — Z00.00 ENCOUNTER FOR ROUTINE ADULT HEALTH EXAMINATION WITHOUT ABNORMAL FINDINGS: Primary | ICD-10-CM

## 2022-05-18 DIAGNOSIS — R79.89 ELEVATED LIVER FUNCTION TESTS: ICD-10-CM

## 2022-05-18 DIAGNOSIS — Z12.31 VISIT FOR SCREENING MAMMOGRAM: ICD-10-CM

## 2022-05-18 DIAGNOSIS — M72.2 PLANTAR FASCIITIS: ICD-10-CM

## 2022-05-18 DIAGNOSIS — R53.83 FATIGUE, UNSPECIFIED TYPE: ICD-10-CM

## 2022-05-18 DIAGNOSIS — M25.561 PAIN IN BOTH KNEES, UNSPECIFIED CHRONICITY: ICD-10-CM

## 2022-05-18 DIAGNOSIS — M25.562 PAIN IN BOTH KNEES, UNSPECIFIED CHRONICITY: ICD-10-CM

## 2022-05-18 DIAGNOSIS — E03.9 ACQUIRED HYPOTHYROIDISM: ICD-10-CM

## 2022-05-18 DIAGNOSIS — Z11.59 NEED FOR HEPATITIS C SCREENING TEST: ICD-10-CM

## 2022-05-18 DIAGNOSIS — Z13.220 SCREENING FOR LIPOID DISORDERS: ICD-10-CM

## 2022-05-18 DIAGNOSIS — E61.1 IRON DEFICIENCY: ICD-10-CM

## 2022-05-18 DIAGNOSIS — F41.1 GAD (GENERALIZED ANXIETY DISORDER): ICD-10-CM

## 2022-05-18 DIAGNOSIS — F41.9 ANXIETY: ICD-10-CM

## 2022-05-18 LAB
ALBUMIN SERPL-MCNC: 4 G/DL (ref 3.4–5)
ALP SERPL-CCNC: 99 U/L (ref 40–150)
ALT SERPL W P-5'-P-CCNC: 142 U/L (ref 0–50)
ANION GAP SERPL CALCULATED.3IONS-SCNC: 5 MMOL/L (ref 3–14)
AST SERPL W P-5'-P-CCNC: 65 U/L (ref 0–45)
BASOPHILS # BLD AUTO: 0 10E3/UL (ref 0–0.2)
BASOPHILS NFR BLD AUTO: 0 %
BILIRUB SERPL-MCNC: 0.4 MG/DL (ref 0.2–1.3)
BUN SERPL-MCNC: 13 MG/DL (ref 7–30)
CALCIUM SERPL-MCNC: 9.3 MG/DL (ref 8.5–10.1)
CHLORIDE BLD-SCNC: 100 MMOL/L (ref 94–109)
CHOLEST SERPL-MCNC: 190 MG/DL
CO2 SERPL-SCNC: 30 MMOL/L (ref 20–32)
CREAT SERPL-MCNC: 0.75 MG/DL (ref 0.52–1.04)
EOSINOPHIL # BLD AUTO: 0.1 10E3/UL (ref 0–0.7)
EOSINOPHIL NFR BLD AUTO: 1 %
ERYTHROCYTE [DISTWIDTH] IN BLOOD BY AUTOMATED COUNT: 13 % (ref 10–15)
FASTING STATUS PATIENT QL REPORTED: NORMAL
FERRITIN SERPL-MCNC: 59 NG/ML (ref 12–150)
GFR SERPL CREATININE-BSD FRML MDRD: >90 ML/MIN/1.73M2
GLUCOSE BLD-MCNC: 84 MG/DL (ref 70–99)
HCT VFR BLD AUTO: 43 % (ref 35–47)
HDLC SERPL-MCNC: 86 MG/DL
HGB BLD-MCNC: 14.4 G/DL (ref 11.7–15.7)
LDLC SERPL CALC-MCNC: 83 MG/DL
LYMPHOCYTES # BLD AUTO: 2.3 10E3/UL (ref 0.8–5.3)
LYMPHOCYTES NFR BLD AUTO: 24 %
MCH RBC QN AUTO: 29.3 PG (ref 26.5–33)
MCHC RBC AUTO-ENTMCNC: 33.5 G/DL (ref 31.5–36.5)
MCV RBC AUTO: 87 FL (ref 78–100)
MONOCYTES # BLD AUTO: 0.7 10E3/UL (ref 0–1.3)
MONOCYTES NFR BLD AUTO: 7 %
NEUTROPHILS # BLD AUTO: 6.6 10E3/UL (ref 1.6–8.3)
NEUTROPHILS NFR BLD AUTO: 68 %
NONHDLC SERPL-MCNC: 104 MG/DL
PLATELET # BLD AUTO: 277 10E3/UL (ref 150–450)
POTASSIUM BLD-SCNC: 4.2 MMOL/L (ref 3.4–5.3)
PROT SERPL-MCNC: 7.9 G/DL (ref 6.8–8.8)
RBC # BLD AUTO: 4.92 10E6/UL (ref 3.8–5.2)
SODIUM SERPL-SCNC: 135 MMOL/L (ref 133–144)
TRIGL SERPL-MCNC: 105 MG/DL
TSH SERPL DL<=0.005 MIU/L-ACNC: 2.08 MU/L (ref 0.4–4)
WBC # BLD AUTO: 9.7 10E3/UL (ref 4–11)

## 2022-05-18 PROCEDURE — 99396 PREV VISIT EST AGE 40-64: CPT | Performed by: PHYSICIAN ASSISTANT

## 2022-05-18 PROCEDURE — 99214 OFFICE O/P EST MOD 30 MIN: CPT | Mod: 25 | Performed by: PHYSICIAN ASSISTANT

## 2022-05-18 PROCEDURE — 82728 ASSAY OF FERRITIN: CPT | Performed by: PHYSICIAN ASSISTANT

## 2022-05-18 PROCEDURE — 80061 LIPID PANEL: CPT | Performed by: PHYSICIAN ASSISTANT

## 2022-05-18 PROCEDURE — 80050 GENERAL HEALTH PANEL: CPT | Performed by: PHYSICIAN ASSISTANT

## 2022-05-18 PROCEDURE — 86803 HEPATITIS C AB TEST: CPT | Performed by: PHYSICIAN ASSISTANT

## 2022-05-18 PROCEDURE — 82306 VITAMIN D 25 HYDROXY: CPT | Performed by: PHYSICIAN ASSISTANT

## 2022-05-18 PROCEDURE — 36415 COLL VENOUS BLD VENIPUNCTURE: CPT | Performed by: PHYSICIAN ASSISTANT

## 2022-05-18 RX ORDER — ATENOLOL 25 MG/1
25 TABLET ORAL DAILY PRN
Qty: 30 TABLET | Refills: 0 | Status: SHIPPED | OUTPATIENT
Start: 2022-05-18 | End: 2022-06-13

## 2022-05-18 RX ORDER — VENLAFAXINE HYDROCHLORIDE 75 MG/1
225 CAPSULE, EXTENDED RELEASE ORAL DAILY
Qty: 90 CAPSULE | Refills: 3 | Status: SHIPPED | OUTPATIENT
Start: 2022-05-18 | End: 2022-09-14

## 2022-05-18 RX ORDER — BUSPIRONE HYDROCHLORIDE 30 MG/1
30 TABLET ORAL DAILY
Qty: 90 TABLET | Refills: 3 | Status: SHIPPED | OUTPATIENT
Start: 2022-05-18 | End: 2023-05-15

## 2022-05-18 ASSESSMENT — ENCOUNTER SYMPTOMS
JOINT SWELLING: 1
SORE THROAT: 0
ARTHRALGIAS: 1
HEMATOCHEZIA: 0
FEVER: 0
HEADACHES: 0
DIZZINESS: 0
SHORTNESS OF BREATH: 0
EYE PAIN: 0
HEMATURIA: 0
PARESTHESIAS: 0
NAUSEA: 0
COUGH: 0
WEAKNESS: 0
DYSURIA: 0
PALPITATIONS: 0
MYALGIAS: 1
HEARTBURN: 0
FREQUENCY: 0
DIARRHEA: 0
CHILLS: 0
BREAST MASS: 0
NERVOUS/ANXIOUS: 0
CONSTIPATION: 0
ABDOMINAL PAIN: 0

## 2022-05-18 NOTE — PROGRESS NOTES
SUBJECTIVE:   CC: Shirlene Rodriguez is an 42 year old woman who presents for preventive health visit.     Patient has been advised of split billing requirements and indicates understanding: Yes  Healthy Habits:     Getting at least 3 servings of Calcium per day:  Yes    Bi-annual eye exam:  Yes    Dental care twice a year:  Yes    Sleep apnea or symptoms of sleep apnea:  Daytime drowsiness    Diet:  Regular (no restrictions)    Frequency of exercise:  2-3 days/week    Duration of exercise:  30-45 minutes    Taking medications regularly:  No    Barriers to taking medications:  None    PHQ-2 Total Score: 0    Additional concerns today:  Yes    *  Would like to have some allergy testing done    Since last visit, patient describes the following symptoms: +Weight gain, +hair loss, +dry/oily skin changes, no constipation, no loose stools + fatigue  Interested in TSH closer to 1  Taking fiber gummies    ** having random pains: left DIP joint 1st finger  Knees give out going up/down stairs they buckle, painful. Once gave out and was very painful, she fell to the floor  Bottom of her feet/arch not heel. First thing in the morning    ** drowsiness: 8-9 hours sleep at night, fatigue gets worse throughout the day. More the past few months. Snores when very tired. No apnea.  Had sleep eval September, did not do sleep study    ** interested in seeing allergist. Her whole life has flushed easily. Sometimes urticaria as well on neck/chest with this. Seems triggered by certain foods: alcohol, spicy foods, gluten, but has not figured out other triggers. Frustrating.    Today's PHQ-2 Score:   PHQ-2 ( 1999 [employer]) 5/16/2022   Q1: Little interest or pleasure in doing things 0   Q2: Feeling down, depressed or hopeless 0   PHQ-2 Score 0   PHQ-2 Total Score (12-17 Years)- Positive if 3 or more points; Administer PHQ-A if positive -   Q1: Little interest or pleasure in doing things Not at all   Q2: Feeling down, depressed or hopeless Not  at all   PHQ-2 Score 0       Abuse: Current or Past (Physical, Sexual or Emotional) - No  Do you feel safe in your environment? Yes    Have you ever done Advance Care Planning? (For example, a Health Directive, POLST, or a discussion with a medical provider or your loved ones about your wishes): No, advance care planning information given to patient to review.  Patient plans to discuss their wishes with loved ones or provider.      Social History     Tobacco Use     Smoking status: Never Smoker     Smokeless tobacco: Never Used   Substance Use Topics     Alcohol use: Yes     Comment: rare     If you drink alcohol do you typically have >3 drinks per day or >7 drinks per week? No    No flowsheet data found.    Reviewed orders with patient.  Reviewed health maintenance and updated orders accordingly - Yes  Lab work is in process  Labs reviewed in EPIC  BP Readings from Last 3 Encounters:   22 122/77   22 114/80   21 115/74    Wt Readings from Last 3 Encounters:   22 88.5 kg (195 lb 3.2 oz)   21 79.4 kg (175 lb)   21 79.3 kg (174 lb 14.4 oz)                  Patient Active Problem List   Diagnosis     VANESSA (generalized anxiety disorder)     GERD (gastroesophageal reflux disease)     History of      Acne     Acquired hypothyroidism     mirena IUD 2020     Gallstones     Past Surgical History:   Procedure Laterality Date     CARDIAC STRESS TST,COMPLETE      Normal      SECTION  2012    Procedure:  SECTION;   SECTION;  Surgeon: Warner Ac MD;  Location:  [facility]      SECTION, TUBAL LIGATION, COMBINED N/A 2016    Procedure: COMBINED  SECTION, TUBAL LIGATION;  Surgeon: Warner Ac MD;  Location:  [facility]     GYN SURGERY  2012    C section     LAPAROSCOPIC CHOLECYSTECTOMY N/A 10/6/2020    Procedure: laparoscopic cholecystectomy and removal of abdominal wall mass;  Surgeon: Dipak Cornelius MD;  Location: [facility]  OR     SURGICAL HISTORY OF -   2004    root canal       Social History     Tobacco Use     Smoking status: Never Smoker     Smokeless tobacco: Never Used   Substance Use Topics     Alcohol use: Yes     Comment: rare     Family History   Problem Relation Age of Onset     Alcohol/Drug Mother         alcoholic     Pulmonary Embolism Mother      Arthritis Mother      Lung Cancer Mother         with mets     Prostate Cancer Father 65        with mets to bones/lung     Snoring Sister      Kidney Disease Sister      Myocardial Infarction Paternal Grandmother      Alzheimer Disease Paternal Grandfather            Breast Cancer Screening:    Breast CA Risk Assessment (FHS-7) 5/16/2022   Do you have a family history of breast, colon, or ovarian cancer? No / Unknown         Mammogram Screening - Offered annual screening and updated Health Maintenance for mutual plan based on risk factor consideration    Pertinent mammograms are reviewed under the imaging tab.    History of abnormal Pap smear: NO - age 30-65 PAP every 5 years with negative HPV co-testing recommended  PAP / HPV Latest Ref Rng & Units 4/24/2018 5/6/2014 11/9/2011   PAP (Historical) - NIL NIL NIL   HPV16 NEG:Negative Negative - -   HPV18 NEG:Negative Negative - -   HRHPV NEG:Negative Negative - -     Reviewed and updated as needed this visit by clinical staff   Tobacco  Allergies  Meds  Problems  Med Hx  Surg Hx  Fam Hx  Soc   Hx          Reviewed and updated as needed this visit by Provider   Tobacco  Allergies  Meds  Problems  Med Hx  Surg Hx  Fam Hx           Past Medical History:   Diagnosis Date     Abnormal Pap smear of cervix 11/9/2011     Acne      Allergies      Anxiety state, unspecified 1/20/2005     Major depression in complete remission (H) 4/24/2012     Papanicolaou smear of cervix with low grade squamous intraepithelial lesion (LGSIL) 2005    HPV 16 - high risk - colp negative     Unspecified asthma(493.90)      Unspecified  "hypothyroidism 2005      Past Surgical History:   Procedure Laterality Date     CARDIAC STRESS TST,COMPLETE      Normal      SECTION  2012    Procedure:  SECTION;   SECTION;  Surgeon: Warner Ac MD;  Location: [facility]      SECTION, TUBAL LIGATION, COMBINED N/A 2016    Procedure: COMBINED  SECTION, TUBAL LIGATION;  Surgeon: Warner Ac MD;  Location: [facility]     GYN SURGERY  2012    C section     LAPAROSCOPIC CHOLECYSTECTOMY N/A 10/6/2020    Procedure: laparoscopic cholecystectomy and removal of abdominal wall mass;  Surgeon: Dipak Cornelius MD;  Location: [facility]     SURGICAL HISTORY OF -       root canal       Review of Systems   Constitutional: Negative for chills and fever.   HENT: Negative for congestion, ear pain, hearing loss and sore throat.    Eyes: Negative for pain and visual disturbance.   Respiratory: Negative for cough and shortness of breath.    Cardiovascular: Negative for chest pain, palpitations and peripheral edema.   Gastrointestinal: Negative for abdominal pain, constipation, diarrhea, heartburn, hematochezia and nausea.   Breasts:  Negative for tenderness, breast mass and discharge.   Genitourinary: Negative for dysuria, frequency, genital sores, hematuria, pelvic pain, urgency, vaginal bleeding and vaginal discharge.   Musculoskeletal: Positive for arthralgias, joint swelling and myalgias.   Skin: Negative for rash.   Neurological: Negative for dizziness, weakness, headaches and paresthesias.   Psychiatric/Behavioral: Negative for mood changes. The patient is not nervous/anxious.         OBJECTIVE:   /77   Pulse 92   Temp 98.7  F (37.1  C) (Tympanic)   Resp 16   Ht 1.643 m (5' 4.69\")   Wt 88.5 kg (195 lb 3.2 oz)   SpO2 99%   BMI 32.80 kg/m    Physical Exam  GENERAL: healthy, alert and no distress  EYES: Eyes grossly normal to inspection, PERRL and conjunctivae and sclerae normal  HENT: ear canals and " TM's normal, nose and mouth without ulcers or lesions  NECK: no adenopathy, no asymmetry, masses, or scars and thyroid normal to palpation  RESP: lungs clear to auscultation - no rales, rhonchi or wheezes  BREAST: normal without masses, tenderness or nipple discharge and no palpable axillary masses or adenopathy  CV: regular rate and rhythm, normal S1 S2, no S3 or S4, no murmur, click or rub, no peripheral edema and peripheral pulses strong  ABDOMEN: soft, nontender, no hepatosplenomegaly, no masses and bowel sounds normal  MS: no gross musculoskeletal defects noted, no edema  POSITIVE bilateral knee crepitus to patella on grinding, no ligament laxity or pain, full ROM. No joint line tenderness. Notes pain at patellar tendon  POSITIVE bilateral feet tenderness at plantar fascia. Full ROM ankle/toes  SKIN: no suspicious lesions or rashes  NEURO: Normal strength and tone, mentation intact and speech normal  BACK: no CVA tenderness, no paralumbar tenderness  PSYCH: mentation appears normal, affect normal/bright  LYMPH: no cervical, supraclavicular, axillary, or inguinal adenopathy    Diagnostic Test Results:  Labs reviewed in Epic    ASSESSMENT/PLAN:     ASSESSMENT/PLAN:      ICD-10-CM    1. Encounter for routine adult health examination without abnormal findings  Z00.00    2. Need for hepatitis C screening test  Z11.59 Hepatitis C Screen Reflex to HCV RNA Quant and Genotype     Hepatitis C Screen Reflex to HCV RNA Quant and Genotype   3. Acquired hypothyroidism  E03.9 TSH WITH FREE T4 REFLEX     TSH WITH FREE T4 REFLEX     levothyroxine (SYNTHROID/LEVOTHROID) 112 MCG tablet     TSH with free T4 reflex   4. VANESSA (generalized anxiety disorder)  F41.1 venlafaxine (EFFEXOR XR) 75 MG 24 hr capsule   5. Anxiety  F41.9 busPIRone HCl (BUSPAR) 30 MG tablet     atenolol (TENORMIN) 25 MG tablet   6. Visit for screening mammogram  Z12.31 *MA Screening Digital Bilateral   7. Fatigue, unspecified type  R53.83 CBC with platelets and  differential     Vitamin D Deficiency     Comprehensive metabolic panel (BMP + Alb, Alk Phos, ALT, AST, Total. Bili, TP)     Comprehensive metabolic panel (BMP + Alb, Alk Phos, ALT, AST, Total. Bili, TP)     Vitamin D Deficiency     CBC with platelets and differential   8. Iron deficiency  E61.1 Ferritin     CBC with platelets and differential     Ferritin     CBC with platelets and differential   9. Screening for lipoid disorders  Z13.220 Lipid panel reflex to direct LDL Non-fasting     Lipid panel reflex to direct LDL Non-fasting   10. Urticaria  L50.9 Adult Allergy/Asthma Referral   11. Elevated liver function tests  R79.89 **Hepatic panel FUTURE 2mo       Patient Instructions   Continue current medications  Can trial atenolol low dose as needed for anxiety. If it does not work, do not use it  Follow up sooner if any concerns or worsening of symptoms    Follow up with sleep clinic for evaluation    Knee stretches: mini squats, mini lunges, straight leg raise  Let me know if you would like physical therapy referral    Allergy referral    Can use NSAID's and application of cold packs as needed to treat or prevent pain.  Try to reduce walking on hard surfaces. Wear shoes with arch support in the house   some arch supports such as Spenco or super feet  Roll your foot over a tennis ball or frozen water bottle  ABC foot exercises every morning before you get out of bed  Each day spend time picking up a washcloth from the floor with your toes  Once or twice daily put your foot on your bed, reach down to pull your toes back - stretch hamstrings  Can try night splint or [place] sock  If not greatly improved can see podiatry for possible orthotics or steroid injections.      COUNSELING:  Reviewed preventive health counseling, as reflected in patient instructions       Regular exercise       Healthy diet/nutrition       The 10-year ASCVD risk score ([place] DC Jr., et al., 2013) is: 0.2%    Values used to calculate  "the score:      Age: 42 years      Sex: Female      Is Non- : No      Diabetic: No      Tobacco smoker: No      Systolic Blood Pressure: 122 mmHg      Is BP treated: No      HDL Cholesterol: 86 mg/dL      Total Cholesterol: 190 mg/dL    Estimated body mass index is 32.8 kg/m  as calculated from the following:    Height as of this encounter: 1.643 m (5' 4.69\").    Weight as of this encounter: 88.5 kg (195 lb 3.2 oz).    Weight management plan: Discussed healthy diet and exercise guidelines we did discuss weight management clinic briefly, patient to let me know if interested    She reports that she has never smoked. She has never used smokeless tobacco.      Counseling Resources:  ATP IV Guidelines  Pooled Cohorts Equation Calculator  Breast Cancer Risk Calculator  BRCA-Related Cancer Risk Assessment: FHS-7 Tool  FRAX Risk Assessment  ICSI Preventive Guidelines  Dietary Guidelines for Americans, 2010  USDA's MyPlate  ASA Prophylaxis  Lung CA Screening    RADHA Cabrera [facility]  "

## 2022-05-18 NOTE — PATIENT INSTRUCTIONS
Continue current medications  Can trial atenolol low dose as needed for anxiety. If it does not work, do not use it  Follow up sooner if any concerns or worsening of symptoms    Follow up with sleep clinic for evaluation    Knee stretches: mini squats, mini lunges, straight leg raise  Let me know if you would like physical therapy referral    Allergy referral    Can use NSAID's and application of cold packs as needed to treat or prevent pain.  Try to reduce walking on hard surfaces. Wear shoes with arch support in the house   some arch supports such as Spenco or super feet  Roll your foot over a tennis ball or frozen water bottle  ABC foot exercises every morning before you get out of bed  Each day spend time picking up a washcloth from the floor with your toes  Once or twice daily put your foot on your bed, reach down to pull your toes back - stretch hamstrings  Can try night splint or [place] sock  If not greatly improved can see podiatry for possible orthotics or steroid injections.

## 2022-05-19 LAB
DEPRECATED CALCIDIOL+CALCIFEROL SERPL-MC: 47 UG/L (ref 20–75)
HCV AB SERPL QL IA: NONREACTIVE

## 2022-05-19 RX ORDER — LEVOTHYROXINE SODIUM 112 UG/1
112 TABLET ORAL DAILY
Qty: 90 TABLET | Refills: 0 | Status: SHIPPED | OUTPATIENT
Start: 2022-05-19 | End: 2022-08-08

## 2022-06-11 DIAGNOSIS — F41.9 ANXIETY: ICD-10-CM

## 2022-06-13 RX ORDER — ATENOLOL 25 MG/1
TABLET ORAL
Qty: 30 TABLET | Refills: 0 | Status: SHIPPED | OUTPATIENT
Start: 2022-06-13 | End: 2022-07-11

## 2022-06-13 NOTE — TELEPHONE ENCOUNTER
Routing refill request to provider for review/approval because:  PCP to decide as ordered for anxiety.  Lourdes Bowser RN

## 2022-06-14 ENCOUNTER — HOSPITAL ENCOUNTER (OUTPATIENT)
Dept: MAMMOGRAPHY | Facility: CLINIC | Age: 42
Discharge: HOME | End: 2022-06-14
Attending: PHYSICIAN ASSISTANT | Admitting: PHYSICIAN ASSISTANT
Payer: COMMERCIAL

## 2022-06-14 DIAGNOSIS — Z12.31 VISIT FOR SCREENING MAMMOGRAM: ICD-10-CM

## 2022-06-14 PROCEDURE — 77067 SCR MAMMO BI INCL CAD: CPT

## 2022-07-09 DIAGNOSIS — F41.9 ANXIETY: ICD-10-CM

## 2022-07-11 RX ORDER — ATENOLOL 25 MG/1
TABLET ORAL
Qty: 30 TABLET | Refills: 0 | Status: SHIPPED | OUTPATIENT
Start: 2022-07-11 | End: 2022-08-08

## 2022-07-11 NOTE — TELEPHONE ENCOUNTER
Routing refill request to provider for review/approval because:  PCP to decide, prescribed for anxiety.  Lourdes Bowser RN

## 2022-08-06 DIAGNOSIS — E03.9 ACQUIRED HYPOTHYROIDISM: ICD-10-CM

## 2022-08-06 DIAGNOSIS — F41.9 ANXIETY: ICD-10-CM

## 2022-08-08 RX ORDER — ATENOLOL 25 MG/1
TABLET ORAL
Qty: 30 TABLET | Refills: 0 | Status: SHIPPED | OUTPATIENT
Start: 2022-08-08 | End: 2022-08-31

## 2022-08-08 RX ORDER — LEVOTHYROXINE SODIUM 112 UG/1
TABLET ORAL
Qty: 90 TABLET | Refills: 0 | Status: SHIPPED | OUTPATIENT
Start: 2022-08-08 | End: 2022-10-07

## 2022-08-25 ENCOUNTER — LAB (OUTPATIENT)
Dept: LAB | Facility: CLINIC | Age: 42
End: 2022-08-25
Payer: COMMERCIAL

## 2022-08-25 ENCOUNTER — TELEPHONE (OUTPATIENT)
Dept: OTHER | Facility: CLINIC | Age: 42
End: 2022-08-25

## 2022-08-25 ENCOUNTER — OFFICE VISIT (OUTPATIENT)
Dept: ALLERGY | Facility: CLINIC | Age: 42
End: 2022-08-25
Payer: COMMERCIAL

## 2022-08-25 VITALS
DIASTOLIC BLOOD PRESSURE: 82 MMHG | BODY MASS INDEX: 32.3 KG/M2 | HEART RATE: 93 BPM | SYSTOLIC BLOOD PRESSURE: 119 MMHG | WEIGHT: 192.24 LBS | TEMPERATURE: 97.2 F | OXYGEN SATURATION: 97 %

## 2022-08-25 DIAGNOSIS — R23.2 FLUSHING REACTION: ICD-10-CM

## 2022-08-25 DIAGNOSIS — E03.9 ACQUIRED HYPOTHYROIDISM: Primary | ICD-10-CM

## 2022-08-25 DIAGNOSIS — E03.9 ACQUIRED HYPOTHYROIDISM: ICD-10-CM

## 2022-08-25 DIAGNOSIS — R79.89 ELEVATED LIVER FUNCTION TESTS: ICD-10-CM

## 2022-08-25 LAB
ALBUMIN SERPL-MCNC: 4 G/DL (ref 3.4–5)
ALP SERPL-CCNC: 112 U/L (ref 40–150)
ALT SERPL W P-5'-P-CCNC: 73 U/L (ref 0–50)
ANION GAP SERPL CALCULATED.3IONS-SCNC: 3 MMOL/L (ref 3–14)
AST SERPL W P-5'-P-CCNC: 31 U/L (ref 0–45)
BILIRUB DIRECT SERPL-MCNC: 0.1 MG/DL (ref 0–0.2)
BILIRUB SERPL-MCNC: 0.4 MG/DL (ref 0.2–1.3)
BUN SERPL-MCNC: 10 MG/DL (ref 7–30)
CALCIUM SERPL-MCNC: 9.3 MG/DL (ref 8.5–10.1)
CHLORIDE BLD-SCNC: 107 MMOL/L (ref 94–109)
CO2 SERPL-SCNC: 29 MMOL/L (ref 20–32)
CREAT SERPL-MCNC: 0.79 MG/DL (ref 0.52–1.04)
GFR SERPL CREATININE-BSD FRML MDRD: >90 ML/MIN/1.73M2
GLUCOSE BLD-MCNC: 98 MG/DL (ref 70–99)
POTASSIUM BLD-SCNC: 4.4 MMOL/L (ref 3.4–5.3)
PROT SERPL-MCNC: 7.7 G/DL (ref 6.8–8.8)
SODIUM SERPL-SCNC: 139 MMOL/L (ref 133–144)
T4 FREE SERPL-MCNC: 1.07 NG/DL (ref 0.76–1.46)
TSH SERPL DL<=0.005 MIU/L-ACNC: 0.3 MU/L (ref 0.4–4)

## 2022-08-25 PROCEDURE — 86038 ANTINUCLEAR ANTIBODIES: CPT

## 2022-08-25 PROCEDURE — 82248 BILIRUBIN DIRECT: CPT

## 2022-08-25 PROCEDURE — 83520 IMMUNOASSAY QUANT NOS NONAB: CPT

## 2022-08-25 PROCEDURE — 86376 MICROSOMAL ANTIBODY EACH: CPT

## 2022-08-25 PROCEDURE — 84443 ASSAY THYROID STIM HORMONE: CPT

## 2022-08-25 PROCEDURE — 86800 THYROGLOBULIN ANTIBODY: CPT

## 2022-08-25 PROCEDURE — 80053 COMPREHEN METABOLIC PANEL: CPT

## 2022-08-25 PROCEDURE — 99204 OFFICE O/P NEW MOD 45 MIN: CPT | Performed by: ALLERGY & IMMUNOLOGY

## 2022-08-25 PROCEDURE — 36415 COLL VENOUS BLD VENIPUNCTURE: CPT

## 2022-08-25 PROCEDURE — 84439 ASSAY OF FREE THYROXINE: CPT

## 2022-08-25 RX ORDER — 1.1% SODIUM FLUORIDE PRESCRIPTION DENTAL CREAM 5 MG/G
CREAM DENTAL
COMMUNITY
Start: 2022-08-12

## 2022-08-25 ASSESSMENT — ENCOUNTER SYMPTOMS
ADENOPATHY: 0
CHEST TIGHTNESS: 0
VOMITING: 0
ARTHRALGIAS: 0
EYE REDNESS: 0
DIARRHEA: 0
SINUS PRESSURE: 0
ACTIVITY CHANGE: 0
RHINORRHEA: 0
JOINT SWELLING: 0
SHORTNESS OF BREATH: 0
FACIAL SWELLING: 0
MYALGIAS: 0
WHEEZING: 0
HEADACHES: 0
CHILLS: 0
COUGH: 0
NAUSEA: 0
EYE ITCHING: 0
EYE DISCHARGE: 0
FEVER: 0

## 2022-08-25 NOTE — LETTER
2022         RE: Shirlene Rodriguez  [address] [place] [address]        Dear Colleague,    Thank you for referring your patient, Shirlene Rodriguez, to the [facility]. Please see a copy of my visit note below.    SUBJECTIVE:                                                                   Shirlene Rodriguez is a 42 year old female presents today to our Allergy Clinic at [facility]; She is being seen in consultation at the request of Gwen Zambrano PA-C,  for urticaria evaluation.  The patient reports that for more than 10 years, she has daily episodes of redness/flushing/blotchy skin on her face, neck, and upper chest.  Skin is nonpruritic.  Not associated with visible angioedema, throat closing sensation, throat swelling, diarrhea, or shortness of breath.    It can happen after she ingests multiple foods that are unrelated to each other, such as spices, coffee, alcohol, or just regular meals.  It can also happen if she does not eat anything but is exposed to heat or strong emotions.  She is being treated with Rhofade and tretinoin by Dermatology.  She still has issues.    I reviewed her recent lab results.  Comprehensive metabolic panel with elevated ALT and AST.  Nonreactive hepatitis C antibody.  CBC with differential within normal limits.  TSH in May 2022 was within normal limits.    Patient Active Problem List   Diagnosis     VANESSA (generalized anxiety disorder)     GERD (gastroesophageal reflux disease)     History of      Acne     Acquired hypothyroidism     mirena IUD 2020     Gallstones       Past Medical History:   Diagnosis Date     Abnormal Pap smear of cervix 2011     Acne      Allergies      Anxiety state, unspecified 2005     Major depression in complete remission (H) 2012     Papanicolaou smear of cervix with low grade squamous intraepithelial lesion (LGSIL) 2005    HPV 16 - high risk - colp  negative     Unspecified asthma(493.90)      Unspecified hypothyroidism 2005      Problem (# of Occurrences) Relation (Name,Age of Onset)    Alcohol/Drug (1) Mother: alcoholic    Alzheimer Disease (1) Paternal Grandfather    Arthritis (1) Mother    Chronic Obstructive Pulmonary Disease (1) Mother    Kidney Disease (1) Sister (Scarlet)    Lung Cancer (1) Mother: with mets    Myocardial Infarction (1) Paternal Grandmother    Prostate Cancer (1) Father (65): with mets to bones/lung    Pulmonary Embolism (1) Mother    Snoring (1) Sister        Past Surgical History:   Procedure Laterality Date     CARDIAC STRESS TST,COMPLETE  2009    Normal      SECTION  2012    Procedure:  SECTION;   SECTION;  Surgeon: Warner Ac MD;  [facility]      SECTION, TUBAL LIGATION, COMBINED N/A 2016    Procedure: COMBINED  SECTION, TUBAL LIGATION;  Surgeon: Warner Ac MD;  [facility]     GYN SURGERY  2012    C section     LAPAROSCOPIC CHOLECYSTECTOMY N/A 10/06/2020    Procedure: laparoscopic cholecystectomy and removal of abdominal wall mass;  Surgeon: Dipak Cornelius MD;  Location: [facility]     SURGICAL HISTORY OF -   2004    root canal     Social History     Socioeconomic History     Marital status:      Spouse name: None     Number of children: None     Years of education: 14     Highest education level: None   Occupational History     Occupation: Pharmacy Technician     Employer: [employer]   Tobacco Use     Smoking status: Never Smoker     Smokeless tobacco: Never Used   Substance and Sexual Activity     Alcohol use: Yes     Comment: rare     Drug use: No     Sexual activity: Yes     Partners: Male     Birth control/protection: Female Surgical     Comment: tubal ligation   Other Topics Concern      Service No     Blood Transfusions No     Caffeine Concern Yes     Comment: Advised not more than 200 mg/day      Occupational Exposure Yes     Comment: Pharmacy tech/IT     Sleep Concern No     Stress Concern Yes     Comment: anxiety     Weight Concern No     Special Diet No     Back Care No     Exercise Yes     Comment: Advised walking 30 minutes/day     Seat Belt Yes     Self-Exams Yes   Social History Narrative     to Jose De Jesus and lives in [place] with their daughter, Lulu.  No smokers in the home.  Has one indoor cat.  Advised about toxoplasmosis precautions.   No concerns about domestic violence.              August 25, 2022    ENVIRONMENTAL HISTORY: [place] The home is heated with a forced air. They do have central air conditioning. The patient's bedroom is furnished with hard jagruti in bedroom, fabric window coverings, and cat occasionally sleeps in bedroom.  Pets inside the house include 1 cat. There is no history of cockroach or mice infestation. There is/are 0 smokers in the house.  The house does not have a damp basement.            Review of Systems   Constitutional: Negative for activity change, chills and fever.   HENT: Positive for sneezing (intermittently). Negative for congestion, dental problem, ear pain, facial swelling, nosebleeds, postnasal drip, rhinorrhea and sinus pressure.    Eyes: Negative for discharge, redness and itching.   Respiratory: Negative for cough, chest tightness, shortness of breath and wheezing.    Cardiovascular: Negative for chest pain.   Gastrointestinal: Negative for diarrhea, nausea and vomiting.   Musculoskeletal: Negative for arthralgias, joint swelling and myalgias.   Skin: Negative for rash.   Neurological: Negative for headaches.   Hematological: Negative for adenopathy.   Psychiatric/Behavioral: Negative for behavioral problems and self-injury.           Current Outpatient Medications:      atenolol (TENORMIN) 25 MG tablet, TAKE ONE TABLET BY MOUTH ONCE DAILY AS NEEDED FOR ANXIETY, Disp: 30 tablet, Rfl: 0     busPIRone HCl  (BUSPAR) 30 MG tablet, Take 1 tablet (30 mg) by mouth daily, Disp: 90 tablet, Rfl: 3     levonorgestrel (MIRENA) 20 MCG/24HR IUD, 1 each (20 mcg) by Intrauterine route once, Disp: , Rfl:      levothyroxine (SYNTHROID/LEVOTHROID) 112 MCG tablet, TAKE ONE TABLET BY MOUTH ONCE DAILY, Disp: 90 tablet, Rfl: 0     oxymetazoline HCl (RHOFADE) 1 % cream, Apply a pea-sized amount once daily to the entire face or affected area avoiding the eyes and lips. Wash hands immediately after applying., Disp: 30 g, Rfl: 11     SF 5000 PLUS 1.1 % CREA, , Disp: , Rfl:      spironolactone (ALDACTONE) 100 MG tablet, 1 tab po daily, Disp: 90 tablet, Rfl: 3     spironolactone (ALDACTONE) 50 MG tablet, Take 1 tablet (50 mg) by mouth daily, Disp: 90 tablet, Rfl: 3     tretinoin (RETIN-A) 0.025 % external cream, Apply pea size amount to entire face QD, Disp: 45 g, Rfl: 11     venlafaxine (EFFEXOR XR) 75 MG 24 hr capsule, Take 3 capsules (225 mg) by mouth daily, Disp: 90 capsule, Rfl: 3     LORazepam (ATIVAN) 0.5 MG tablet, Take 1 tablet (0.5 mg) by mouth every 6 hours as needed for anxiety (Patient not taking: No sig reported), Disp: 15 tablet, Rfl: 0  Immunization History   Administered Date(s) Administered     COVID-19,PF,Pfizer (12+ Yrs) 09/16/2021, 10/06/2021     HPV 01/16/2007, 03/22/2007, 05/06/2014     Influenza (IIV3) PF 11/03/2005, 11/06/2006, 09/24/2012     Influenza Vaccine IM > 6 months Valent IIV4 (Alfuria,Fluzone) 10/24/2013, 08/31/2016, 11/01/2019, 09/11/2020, 09/16/2021     Influenza Vaccine, 6+MO IM (QUADRIVALENT W/PRESERVATIVES) 10/14/2014     Pneumococcal 23 valent 11/10/2016     TD (ADULT, 7+) 01/16/2007     TDAP Vaccine (Boostrix) 08/31/2016     Tdap (Adacel,Boostrix) 10/15/2012     Allergies   Allergen Reactions     No Known Allergies      OBJECTIVE:                                                                 /82 (BP Location: Left arm, Patient Position: Sitting, Cuff Size: Adult Regular)   Pulse 93   Temp 97.2   F (36.2  C) (Tympanic)   Wt 87.2 kg (192 lb 3.9 oz)   SpO2 97%   BMI 32.30 kg/m          Physical Exam  Vitals and nursing note reviewed.   Constitutional:       General: She is not in acute distress.     Appearance: She is not ill-appearing, toxic-appearing or diaphoretic.   HENT:      Head: Normocephalic and atraumatic.      Right Ear: Tympanic membrane, ear canal and external ear normal.      Left Ear: Tympanic membrane, ear canal and external ear normal.      Nose: No mucosal edema, congestion or rhinorrhea.      Right Turbinates: Not enlarged, swollen or pale.      Left Turbinates: Not enlarged, swollen or pale.      Mouth/Throat:      Lips: Pink.      Mouth: Mucous membranes are moist.      Pharynx: Oropharynx is clear. No pharyngeal swelling, oropharyngeal exudate, posterior oropharyngeal erythema or uvula swelling.   Eyes:      General:         Right eye: No discharge.         Left eye: No discharge.      Conjunctiva/sclera: Conjunctivae normal.   Cardiovascular:      Rate and Rhythm: Normal rate and regular rhythm.      Heart sounds: Normal heart sounds. No murmur heard.  Pulmonary:      Effort: Pulmonary effort is normal. No respiratory distress.      Breath sounds: Normal breath sounds and air entry. No stridor, decreased air movement or transmitted upper airway sounds. No decreased breath sounds, wheezing, rhonchi or rales.   Musculoskeletal:         General: Normal range of motion.   Lymphadenopathy:      Cervical: No cervical adenopathy.   Skin:     General: Skin is warm.      Comments: Mild erythema of the face.   Neurological:      Mental Status: She is alert and oriented to person, place, and time.   Psychiatric:         Mood and Affect: Mood normal.         Behavior: Behavior normal.             ASSESSMENT/PLAN:    Acquired hypothyroidism  Flushing reaction    Their way the patient describes her symptoms, I believe that she has no urticaria.  Nonpruritic blotchy skin without urticaria or  angioedema.  Seems to be consistent with vasodilator-mediated flushing, which may happen with rosacea, spicy food, and alcohol ingestion.  Emotional flushing and thermoregulatory flushing could contribute to that.  Emotional flushing is more common in women.  I do not think that testing for food allergies is necessary.  Since histamine could be a vasodilator substance, and the etiology of some flushing is unclear, I do recommend a trial of cetirizine 10 mg by mouth once daily for 1 month.  If it is not helpful, recommend the patient to stop it.  Considering a history of acquired hypothyroidism and a recent abnormal CMP, I will reorder thyroid studies, including antibodies, and repeat CMP.  - Ordered ROSA ELENA to screen for autoimmunity.  - I will also order serum tryptase level to assess for systemic mastocytosis.  - If symptoms persist, I suggest an evaluation by vascular medicine.      - Comprehensive metabolic panel  - Anti thyroglobulin antibody  - Thyroid peroxidase antibody  - TSH  - T4 free   Tryptase  - Vascular Medicine Referral  - Anti Nuclear Georgette IgG by IFA with Reflex       Return if symptoms worsen or fail to improve.    Thank you for allowing us to participate in the care of this patient. Please feel free to contact us if there are any questions or concerns about the patient.    Disclaimer: This note consists of symbols derived from keyboarding, dictation and/or voice recognition software. As a result, there may be errors in the script that have gone undetected. Please consider this when interpreting information found in this chart.    Rubio Blake MD, FAAAAI, FACAAI  Allergy, Asthma and Immunology     [facility]       Again, thank you for allowing me to participate in the care of your patient.        Sincerely,        Rubio Blake MD

## 2022-08-25 NOTE — TELEPHONE ENCOUNTER
Left voicemail with instructions for patient to call back to schedule their appointment(s)    August 25, 2022 , 1:53 PM

## 2022-08-25 NOTE — PROGRESS NOTES
SUBJECTIVE:                                                                   Shirlene Rodriguez is a 42 year old female presents today to our Allergy Clinic at [facility]; She is being seen in consultation at the request of Gwen Zambrano PA-C,  for urticaria evaluation.  The patient reports that for more than 10 years, she has daily episodes of redness/flushing/blotchy skin on her face, neck, and upper chest.  Skin is nonpruritic.  Not associated with visible angioedema, throat closing sensation, throat swelling, diarrhea, or shortness of breath.    It can happen after she ingests multiple foods that are unrelated to each other, such as spices, coffee, alcohol, or just regular meals.  It can also happen if she does not eat anything but is exposed to heat or strong emotions.  She is being treated with Rhofade and tretinoin by Dermatology.  She still has issues.    I reviewed her recent lab results.  Comprehensive metabolic panel with elevated ALT and AST.  Nonreactive hepatitis C antibody.  CBC with differential within normal limits.  TSH in May 2022 was within normal limits.    Patient Active Problem List   Diagnosis     VANESSA (generalized anxiety disorder)     GERD (gastroesophageal reflux disease)     History of      Acne     Acquired hypothyroidism     mirena IUD 2020     Gallstones       Past Medical History:   Diagnosis Date     Abnormal Pap smear of cervix 2011     Acne      Allergies      Anxiety state, unspecified 2005     Major depression in complete remission (H) 2012     Papanicolaou smear of cervix with low grade squamous intraepithelial lesion (LGSIL) 2005    HPV 16 - high risk - colp negative     Unspecified asthma(493.90)      Unspecified hypothyroidism 2005      Problem (# of Occurrences) Relation (Name,Age of Onset)    Alcohol/Drug (1) Mother: alcoholic    Alzheimer Disease (1) Paternal Grandfather    Arthritis (1) Mother     Chronic Obstructive Pulmonary Disease (1) Mother    Kidney Disease (1) Sister (Scarlet)    Lung Cancer (1) Mother: with mets    Myocardial Infarction (1) Paternal Grandmother    Prostate Cancer (1) Father (65): with mets to bones/lung    Pulmonary Embolism (1) Mother    Snoring (1) Sister        Past Surgical History:   Procedure Laterality Date     CARDIAC STRESS TST,COMPLETE  2009    Normal      SECTION  2012    Procedure:  SECTION;   SECTION;  Surgeon: Warner Ac MD;  [facility]      SECTION, TUBAL LIGATION, COMBINED N/A 2016    Procedure: COMBINED  SECTION, TUBAL LIGATION;  Surgeon: Warner Ac MD;  [facility]     GYN SURGERY  2012    C section     LAPAROSCOPIC CHOLECYSTECTOMY N/A 10/06/2020    Procedure: laparoscopic cholecystectomy and removal of abdominal wall mass;  Surgeon: Dipak Cornelius MD;  Location: [facility]     SURGICAL HISTORY OF -   2004    root canal     Social History     Socioeconomic History     Marital status:      Spouse name: None     Number of children: None     Years of education: 14     Highest education level: None   Occupational History     Occupation: Pharmacy Technician     Employer: [employer]   Tobacco Use     Smoking status: Never Smoker     Smokeless tobacco: Never Used   Substance and Sexual Activity     Alcohol use: Yes     Comment: rare     Drug use: No     Sexual activity: Yes     Partners: Male     Birth control/protection: Female Surgical     Comment: tubal ligation   Other Topics Concern      Service No     Blood Transfusions No     Caffeine Concern Yes     Comment: Advised not more than 200 mg/day     Occupational Exposure Yes     Comment: Pharmacy tech/IT     Sleep Concern No     Stress Concern Yes     Comment: anxiety     Weight Concern No     Special Diet No     Back Care No     Exercise Yes     Comment: Advised walking 30 minutes/day     Seat Belt  Yes     Self-Exams Yes   Social History Narrative     to Jose De Jesus and lives in [place] with their daughter, Lulu.  No smokers in the home.  Has one indoor cat.  Advised about toxoplasmosis precautions.   No concerns about domestic violence.              August 25, 2022    ENVIRONMENTAL HISTORY: [place] The home is heated with a forced air. They do have central air conditioning. The patient's bedroom is furnished with hard jagruti in bedroom, fabric window coverings, and cat occasionally sleeps in bedroom.  Pets inside the house include 1 cat. There is no history of cockroach or mice infestation. There is/are 0 smokers in the house.  The house does not have a damp basement.            Review of Systems   Constitutional: Negative for activity change, chills and fever.   HENT: Positive for sneezing (intermittently). Negative for congestion, dental problem, ear pain, facial swelling, nosebleeds, postnasal drip, rhinorrhea and sinus pressure.    Eyes: Negative for discharge, redness and itching.   Respiratory: Negative for cough, chest tightness, shortness of breath and wheezing.    Cardiovascular: Negative for chest pain.   Gastrointestinal: Negative for diarrhea, nausea and vomiting.   Musculoskeletal: Negative for arthralgias, joint swelling and myalgias.   Skin: Negative for rash.   Neurological: Negative for headaches.   Hematological: Negative for adenopathy.   Psychiatric/Behavioral: Negative for behavioral problems and self-injury.           Current Outpatient Medications:      atenolol (TENORMIN) 25 MG tablet, TAKE ONE TABLET BY MOUTH ONCE DAILY AS NEEDED FOR ANXIETY, Disp: 30 tablet, Rfl: 0     busPIRone HCl (BUSPAR) 30 MG tablet, Take 1 tablet (30 mg) by mouth daily, Disp: 90 tablet, Rfl: 3     levonorgestrel (MIRENA) 20 MCG/24HR IUD, 1 each (20 mcg) by Intrauterine route once, Disp: , Rfl:      levothyroxine (SYNTHROID/LEVOTHROID) 112 MCG tablet, TAKE ONE  TABLET BY MOUTH ONCE DAILY, Disp: 90 tablet, Rfl: 0     oxymetazoline HCl (RHOFADE) 1 % cream, Apply a pea-sized amount once daily to the entire face or affected area avoiding the eyes and lips. Wash hands immediately after applying., Disp: 30 g, Rfl: 11     SF 5000 PLUS 1.1 % CREA, , Disp: , Rfl:      spironolactone (ALDACTONE) 100 MG tablet, 1 tab po daily, Disp: 90 tablet, Rfl: 3     spironolactone (ALDACTONE) 50 MG tablet, Take 1 tablet (50 mg) by mouth daily, Disp: 90 tablet, Rfl: 3     tretinoin (RETIN-A) 0.025 % external cream, Apply pea size amount to entire face QD, Disp: 45 g, Rfl: 11     venlafaxine (EFFEXOR XR) 75 MG 24 hr capsule, Take 3 capsules (225 mg) by mouth daily, Disp: 90 capsule, Rfl: 3     LORazepam (ATIVAN) 0.5 MG tablet, Take 1 tablet (0.5 mg) by mouth every 6 hours as needed for anxiety (Patient not taking: No sig reported), Disp: 15 tablet, Rfl: 0  Immunization History   Administered Date(s) Administered     COVID-19,PF,Pfizer (12+ Yrs) 09/16/2021, 10/06/2021     HPV 01/16/2007, 03/22/2007, 05/06/2014     Influenza (IIV3) PF 11/03/2005, 11/06/2006, 09/24/2012     Influenza Vaccine IM > 6 months Valent IIV4 (Alfuria,Fluzone) 10/24/2013, 08/31/2016, 11/01/2019, 09/11/2020, 09/16/2021     Influenza Vaccine, 6+MO IM (QUADRIVALENT W/PRESERVATIVES) 10/14/2014     Pneumococcal 23 valent 11/10/2016     TD (ADULT, 7+) 01/16/2007     TDAP Vaccine (Boostrix) 08/31/2016     Tdap (Adacel,Boostrix) 10/15/2012     Allergies   Allergen Reactions     No Known Allergies      OBJECTIVE:                                                                 /82 (BP Location: Left arm, Patient Position: Sitting, Cuff Size: Adult Regular)   Pulse 93   Temp 97.2  F (36.2  C) (Tympanic)   Wt 87.2 kg (192 lb 3.9 oz)   SpO2 97%   BMI 32.30 kg/m          Physical Exam  Vitals and nursing note reviewed.   Constitutional:       General: She is not in acute distress.     Appearance: She is not ill-appearing,  toxic-appearing or diaphoretic.   HENT:      Head: Normocephalic and atraumatic.      Right Ear: Tympanic membrane, ear canal and external ear normal.      Left Ear: Tympanic membrane, ear canal and external ear normal.      Nose: No mucosal edema, congestion or rhinorrhea.      Right Turbinates: Not enlarged, swollen or pale.      Left Turbinates: Not enlarged, swollen or pale.      Mouth/Throat:      Lips: Pink.      Mouth: Mucous membranes are moist.      Pharynx: Oropharynx is clear. No pharyngeal swelling, oropharyngeal exudate, posterior oropharyngeal erythema or uvula swelling.   Eyes:      General:         Right eye: No discharge.         Left eye: No discharge.      Conjunctiva/sclera: Conjunctivae normal.   Cardiovascular:      Rate and Rhythm: Normal rate and regular rhythm.      Heart sounds: Normal heart sounds. No murmur heard.  Pulmonary:      Effort: Pulmonary effort is normal. No respiratory distress.      Breath sounds: Normal breath sounds and air entry. No stridor, decreased air movement or transmitted upper airway sounds. No decreased breath sounds, wheezing, rhonchi or rales.   Musculoskeletal:         General: Normal range of motion.   Lymphadenopathy:      Cervical: No cervical adenopathy.   Skin:     General: Skin is warm.      Comments: Mild erythema of the face.   Neurological:      Mental Status: She is alert and oriented to person, place, and time.   Psychiatric:         Mood and Affect: Mood normal.         Behavior: Behavior normal.             ASSESSMENT/PLAN:    Acquired hypothyroidism  Flushing reaction    Their way the patient describes her symptoms, I believe that she has no urticaria.  Nonpruritic blotchy skin without urticaria or angioedema.  Seems to be consistent with vasodilator-mediated flushing, which may happen with rosacea, spicy food, and alcohol ingestion.  Emotional flushing and thermoregulatory flushing could contribute to that.  Emotional flushing is more common in  women.  I do not think that testing for food allergies is necessary.  Since histamine could be a vasodilator substance, and the etiology of some flushing is unclear, I do recommend a trial of cetirizine 10 mg by mouth once daily for 1 month.  If it is not helpful, recommend the patient to stop it.  Considering a history of acquired hypothyroidism and a recent abnormal CMP, I will reorder thyroid studies, including antibodies, and repeat CMP.  - Ordered ROSA ELENA to screen for autoimmunity.  - I will also order serum tryptase level to assess for systemic mastocytosis.  - If symptoms persist, I suggest an evaluation by vascular medicine.      - Comprehensive metabolic panel  - Anti thyroglobulin antibody  - Thyroid peroxidase antibody  - TSH  - T4 free   Tryptase  - Vascular Medicine Referral  - Anti Nuclear Georgette IgG by IFA with Reflex       Return if symptoms worsen or fail to improve.    Thank you for allowing us to participate in the care of this patient. Please feel free to contact us if there are any questions or concerns about the patient.    Disclaimer: This note consists of symbols derived from keyboarding, dictation and/or voice recognition software. As a result, there may be errors in the script that have gone undetected. Please consider this when interpreting information found in this chart.    Rubio Blake MD, FAAAAI, FACAAI  Allergy, Asthma and Immunology     [facility]

## 2022-08-25 NOTE — TELEPHONE ENCOUNTER
Pt referred to [facility] by Rubio Blake MD for flushing reaction.    Patient has labs in Epic.    Pt needs to be scheduled for consult with Dr. Morton or Dr. Colmenares.  Will route to scheduling to coordinate an appointment at next available.    Appointment note: Pt referred to [facility] by Rubio Blake MD for flushing reaction. Labs in Epic.       Ciarra GIBSON, RN    [facility]  Office: [phone]  Fax: [phone]

## 2022-08-25 NOTE — PATIENT INSTRUCTIONS
Get the bloodwork done.     Trial of cetirizine 10 mg by mouth once daily for a month.     Consider seeing vascular medicine. Referral placed.       Allergy Staff Appt Hours Shot Hours Locations    Physician     Rubio Blake MD       Support Staff     Latisha Bhardwaj LPN     Rosalio CMA    Tuesday:   [place] :         :  [facility]  [place]        Thursday: :        Friday: 712:     [place]        Tuesday: : : :: :: :[facility]       Tues & Wed: : & Thurs: :       Fri: :           [facility]  [address] [place] [address]  Appt Line: [phone]      [facility]  [address] [place] [address]  Appt Line: [phone]    Pulmonary Function Scheduling:  [place]: [phone]  [place]: [phone]  [phone]     Important Scheduling Information (if recommended by provider):  Aspirin Desensitization: Appt will last 2 clinic days. Please call the Allergy RN line for your clinic to schedule. Discontinue antihistamines 7 days prior to the appointment.     Food Challenges: Appt will last 3-4 hours. Please call the Allergy RN line for your clinic to schedule. Discontinue antihistamines 7 days prior to the appointment.     Penicillin Testing: Appt will last 2-3 hours. Please call the Allergy RN line for your clinic to schedule. Discontinue antihistamines 7 days prior to the appointment.     Skin Testing: Appt will about 40 minutes. Call the appointment line for your clinic to schedule. Discontinue antihistamines 7 days prior to the appointment.     Thank you for trusting us with your Allergy, Asthma, and Immunology care. Please feel free to contact us with any questions or concerns you may have.      [place] [facility] [phone]

## 2022-08-26 LAB
ANA SER QL IF: NEGATIVE
THYROGLOB AB SERPL IA-ACNC: <20 IU/ML
THYROPEROXIDASE AB SERPL-ACNC: <10 IU/ML
TRYPTASE SERPL-MCNC: 3.6 UG/L

## 2022-08-29 NOTE — RESULT ENCOUNTER NOTE
[employer] message sent:     Serum tryptase level is within normal limits.  Negative ROSA ELENA.  Negative thyroid antibodies.  While free T4 is within normal limits, TSH was decreased.  - Suggest discussing that with PCP.  Comprehensive metabolic panel with elevated ALT.  However, compared with May 2022, August results are better.  - Recommend repeating the levels with PCP in several months.  - No changes in the previous recommendations.

## 2022-08-31 DIAGNOSIS — F41.9 ANXIETY: ICD-10-CM

## 2022-08-31 RX ORDER — ATENOLOL 25 MG/1
TABLET ORAL
Qty: 30 TABLET | Refills: 0 | Status: SHIPPED | OUTPATIENT
Start: 2022-08-31 | End: 2022-09-29

## 2022-09-09 ENCOUNTER — OFFICE VISIT (OUTPATIENT)
Dept: OTHER | Facility: CLINIC | Age: 42
End: 2022-09-09
Attending: ALLERGY & IMMUNOLOGY
Payer: COMMERCIAL

## 2022-09-09 VITALS
SYSTOLIC BLOOD PRESSURE: 119 MMHG | DIASTOLIC BLOOD PRESSURE: 82 MMHG | OXYGEN SATURATION: 98 % | WEIGHT: 195.8 LBS | BODY MASS INDEX: 32.9 KG/M2 | HEART RATE: 89 BPM

## 2022-09-09 DIAGNOSIS — F41.1 GAD (GENERALIZED ANXIETY DISORDER): ICD-10-CM

## 2022-09-09 DIAGNOSIS — R23.2 FLUSHING REACTION: Primary | ICD-10-CM

## 2022-09-09 DIAGNOSIS — E03.9 ACQUIRED HYPOTHYROIDISM: ICD-10-CM

## 2022-09-09 DIAGNOSIS — L70.0 ACNE VULGARIS: ICD-10-CM

## 2022-09-09 PROCEDURE — 99205 OFFICE O/P NEW HI 60 MIN: CPT | Performed by: INTERNAL MEDICINE

## 2022-09-09 PROCEDURE — G0463 HOSPITAL OUTPT CLINIC VISIT: HCPCS

## 2022-09-09 NOTE — PATIENT INSTRUCTIONS
Aspirin daily with food     Discuss with derm for possible flushing from spironolactone try alternate meds and hold some time ?    Continue zyretec    Go for fractionated catehol labs order placed

## 2022-09-09 NOTE — PROGRESS NOTES
[facility] CENTER INITIAL VASCULAR MEDICINE CONSULT    (New patient visit)      PRIMARY HEALTH CARE PROVIDER:  Gwen Zambrano PA-C      REFERRING HEALTH CARE PROVIDER;  Rubio Blake MD      REASON FOR CONSULT: Evaluation and management of vascular causes of flushing of upper chest neck and face area for many years.      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  She gives a history of almost daily episodes of redness, flushing, blotchy skin on her face, neck and upper chest.  This is nonpruritic.  No history of angioedema.  No throat closing sensation or throat swelling.  No diarrhea or other GI symptoms and no history of shortness of breath.  No known history of hypertension but she has been taking spironolactone for acne/rosacea..  There is no particular trigger for the symptoms sometimes food triggers specifically spices, coffee or alcohol or even just regular meals.  Other times during emotional stress or whenever she exposes to the heat.     She is currently following up with a dermatologist and recently seen and evaluated by allergist  Extensive laboratory test done all of them were unremarkable  She is normotensive  She never had flash pulmonary edema  She also underwent CT scan of the abdomen for other reasons and no abnormal masses reported in the intra-abdominal cavity  She also takes Effexor XR, BuSpar for generalized anxiety and depression  Uses lorazepam as needed for anxiety  History of hypothyroidism on replacement recently TSH was low and her primary reduce the dose  to100 mcg daily and planning for repeat thyroid function tests in few weeks    She started taking Zetia take 10 mg daily for the last 10 days and not much difference in her symptoms    She is new to this clinic reviewed  available records and extensive laboratory test done in the past in the epic and updated chart        PAST MEDICAL HISTORY  Past Medical History:   Diagnosis Date     Abnormal Pap smear of cervix 2011     Acne      Allergies      Anxiety state, unspecified 2005     Major depression in complete remission (H) 2012     Papanicolaou smear of cervix with low grade squamous intraepithelial lesion (LGSIL) 2005    HPV 16 - high risk - colp negative     Unspecified asthma(493.90)      Unspecified hypothyroidism 2005       CURRENT MEDICATIONS  atenolol (TENORMIN) 25 MG tablet, TAKE ONE TABLET BY MOUTH ONCE DAILY AS NEEDED FOR ANXIETY  busPIRone HCl (BUSPAR) 30 MG tablet, Take 1 tablet (30 mg) by mouth daily  levonorgestrel (MIRENA) 20 MCG/24HR IUD, 1 each (20 mcg) by Intrauterine route once  levothyroxine (SYNTHROID/LEVOTHROID) 112 MCG tablet, TAKE ONE TABLET BY MOUTH ONCE DAILY  LORazepam (ATIVAN) 0.5 MG tablet, Take 1 tablet (0.5 mg) by mouth every 6 hours as needed for anxiety  oxymetazoline HCl (RHOFADE) 1 % cream, Apply a pea-sized amount once daily to the entire face or affected area avoiding the eyes and lips. Wash hands immediately after applying.  SF 5000 PLUS 1.1 % CREA,   spironolactone (ALDACTONE) 100 MG tablet, 1 tab po daily  spironolactone (ALDACTONE) 50 MG tablet, Take 1 tablet (50 mg) by mouth daily  tretinoin (RETIN-A) 0.025 % external cream, Apply pea size amount to entire face QD  venlafaxine (EFFEXOR XR) 75 MG 24 hr capsule, Take 3 capsules (225 mg) by mouth daily    No current facility-administered medications on file prior to visit.      PAST SURGICAL HISTORY:  Past Surgical History:   Procedure Laterality Date     CARDIAC STRESS TST,COMPLETE  2009    Normal      SECTION  2012    Procedure:  SECTION;   SECTION;  Surgeon: Warner Ac MD;  Location:  [facility]      SECTION, TUBAL LIGATION, COMBINED N/A 2016    Procedure:  COMBINED  SECTION, TUBAL LIGATION;  Surgeon: Warner Ac MD;  Location:  [facility]     GYN SURGERY  2012    C section     LAPAROSCOPIC CHOLECYSTECTOMY N/A 10/06/2020    Procedure: laparoscopic cholecystectomy and removal of abdominal wall mass;  Surgeon: Dipak Cornelius MD;  Location: [facility]     SURGICAL HISTORY OF -   2004    root canal       ALLERGIES     Allergies   Allergen Reactions     No Known Allergies        FAMILY HISTORY  Family History   Problem Relation Age of Onset     Alcohol/Drug Mother         alcoholic     Pulmonary Embolism Mother      Arthritis Mother      Lung Cancer Mother         with mets     Chronic Obstructive Pulmonary Disease Mother      Prostate Cancer Father 65        with mets to bones/lung     Snoring Sister      Kidney Disease Sister      Myocardial Infarction Paternal Grandmother      Alzheimer Disease Paternal Grandfather        VASCULAR FAMILY HISTORY  1st order relative with atherosclerotic PAD: No  1st order relative with AAA: No  Family history of Familial Hyperlipidemia No  Family History of Hypercoagulable state:No    VASCULAR RISK FACTORS  1. Diabetes:No   2. Smoking: has never smoked.  3. HTN: normotensive  4.Hyperlipidemia: No      SOCIAL HISTORY  Social History     Socioeconomic History     Marital status:      Spouse name: Not on file     Number of children: Not on file     Years of education: 14     Highest education level: Not on file   Occupational History     Occupation: Pharmacy Technician     Employer: [employer]   Tobacco Use     Smoking status: Never Smoker     Smokeless tobacco: Never Used   Substance and Sexual Activity     Alcohol use: Yes     Comment: rare     Drug use: No     Sexual activity: Yes     Partners: Male     Birth control/protection: Female Surgical     Comment: tubal ligation   Other Topics Concern      Service No     Blood Transfusions No     Caffeine Concern Yes     Comment: Advised not more  than 200 mg/day     Occupational Exposure Yes     Comment: Pharmacy tech/IT     Hobby Hazards Not Asked     Sleep Concern No     Stress Concern Yes     Comment: anxiety     Weight Concern No     Special Diet No     Back Care No     Exercise Yes     Comment: Advised walking 30 minutes/day     Bike Helmet Not Asked     Seat Belt Yes     Self-Exams Yes     Parent/sibling w/ CABG, MI or angioplasty before 65F 55M? Not Asked   Social History Narrative     to [employer] De [employer] and lives in [place] with their daughter, Lulu.  No smokers in the home.  Has one indoor cat.  Advised about toxoplasmosis precautions.   No concerns about domestic violence.              August 25, 2022    ENVIRONMENTAL HISTORY: [place] The home is heated with a forced air. They do have central air conditioning. The patient's bedroom is furnished with hard jagruti in bedroom, fabric window coverings, and cat occasionally sleeps in bedroom.  Pets inside the house include 1 cat. There is no history of cockroach or mice infestation. There is/are 0 smokers in the house.  The house does not have a damp basement.      Social Determinants of Health     Financial Resource Strain: Not on file   Food Insecurity: Not on file   Transportation Needs: Not on file   Physical Activity: Not on file   Stress: Not on file   Social Connections: Not on file   Intimate Partner Violence: Not on file   Housing Stability: Not on file       ROS:   General: No change in weight, sleep or appetite.  Normal energy.  No fever or chills  Eyes: Negative for vision changes or eye problems  ENT: No problems with ears, nose or throat.  No difficulty swallowing.  Resp: No coughing, wheezing or shortness of breath  CV: No chest pains or palpitations  GI: No nausea, vomiting,  heartburn, abdominal pain, diarrhea, constipation or change in bowel habits  : No urinary frequency or dysuria, bladder or kidney problems  Musculoskeletal: No  significant muscle or joint pains  Neurologic: No headaches, numbness, tingling, weakness, problems with balance or coordination  Psychiatric: No problems with anxiety, depression or mental health  Heme/immune/allergy: No history of bleeding or clotting problems or anemia.  No allergies or immune system problems  Endocrine: No history of thyroid disease, diabetes or other endocrine disorders  Skin: Flushing of the face, neck upper chest wall area for many years exacerbated by stress or anxiety etc.  History of acne taking high-dose spironolactone, Retin-A seen and evaluated by allergist and also closely following up with dermatologist  Vascular:  No claudication, lifestyle limiting or otherwise; no ischemic rest pain; no non-healing ulcers. No weakness, No loss of sensation      EXAM:  /82 (BP Location: Left arm, Patient Position: Chair, Cuff Size: Adult Regular)   Pulse 89   Wt 195 lb 12.8 oz (88.8 kg)   SpO2 98%   Breastfeeding No   BMI 32.90 kg/m    In general, the patient is a pleasant female in no apparent distress.    HEENT: NC/AT.  PERRLA.  EOMI.  Sclerae white, not injected.  Nares clear.  Pharynx without erythema or exudate.  Dentition intact.    Neck: No adenopathy.  No thyromegaly. Carotids +2/2 bilaterally without bruits.  No jugular venous distension.   Heart: RRR. Normal S1, S2 splits physiologically. No murmur, rub, click, or gallop. The PMI is in the 5th ICS in the midclavicular line. There is no heave.    Lungs: CTA.  No ronchi, wheezes, rales.  No dullness to percussion.   Abdomen: Soft, nontender, nondistended. No organomegaly. No AAA.  No bruits.   Extremities: Vascular : skin   no clubbing, cyanosis, or edema. No wounds.   Bilateral symmetrical palpable peripheral pulses in both upper and lower extremities  There is a mild erythema of the face, neck and upper chest area    Labs:  LIPID RESULTS:  Lab Results   Component Value Date    CHOL 190 05/18/2022    CHOL 190 05/06/2014    HDL 86  05/18/2022    HDL 91 05/06/2014    LDL 83 05/18/2022    LDL 86 05/06/2014    TRIG 105 05/18/2022    TRIG 64 05/06/2014    CHOLHDLRATIO 2.0 05/06/2014       LIVER ENZYME RESULTS:  Lab Results   Component Value Date    AST 31 08/25/2022    AST 23 09/11/2020    ALT 73 (H) 08/25/2022    ALT 38 09/11/2020       CBC RESULTS:  Lab Results   Component Value Date    WBC 9.7 05/18/2022    WBC 9.7 03/12/2021    RBC 4.92 05/18/2022    RBC 4.85 03/12/2021    HGB 14.4 05/18/2022    HGB 14.7 03/12/2021    HCT 43.0 05/18/2022    HCT 43.2 03/12/2021    MCV 87 05/18/2022    MCV 89 03/12/2021    MCH 29.3 05/18/2022    MCH 30.3 03/12/2021    MCHC 33.5 05/18/2022    MCHC 34.0 03/12/2021    RDW 13.0 05/18/2022    RDW 12.5 03/12/2021     05/18/2022     03/12/2021       BMP RESULTS:  Lab Results   Component Value Date     08/25/2022     09/11/2020    POTASSIUM 4.4 08/25/2022    POTASSIUM 4.5 09/11/2020    CHLORIDE 107 08/25/2022    CHLORIDE 102 09/11/2020    CO2 29 08/25/2022    CO2 28 09/11/2020    ANIONGAP 3 08/25/2022    ANIONGAP 5 09/11/2020    GLC 98 08/25/2022    GLC 84 09/11/2020    BUN 10 08/25/2022    BUN 10 09/11/2020    CR 0.79 08/25/2022    CR 0.92 09/11/2020    GFRESTIMATED >90 08/25/2022    GFRESTIMATED 78 09/11/2020    GFRESTBLACK >90 09/11/2020    GAVIN 9.3 08/25/2022    GAVIN 9.2 09/11/2020      THYROID RESULTS:  Lab Results   Component Value Date    TSH 0.30 (L) 08/25/2022    TSH 0.78 03/12/2021       Procedures:   CT ABDOMEN AND PELVIS WITH CONTRAST 9/17/2020 2:39 PM     CLINICAL HISTORY: Hernia, complicated. Left lower quadrant abdominal  pain and possible subcutaneous mass. Subcutaneous nodule of abdominal  wall.     TECHNIQUE: CT scan of the abdomen and pelvis was performed following  injection of IV contrast. Multiplanar reformats were obtained. Dose  reduction techniques were used.     CONTRAST: 75 mL Isovue 370     COMPARISON: None.     FINDINGS:   LOWER CHEST: Normal.     HEPATOBILIARY:  Multiple stones are seen within the gallbladder.     PANCREAS: Normal.     SPLEEN: Normal.     ADRENAL GLANDS: Normal.     KIDNEYS/BLADDER: Normal.     BOWEL: Normal.     PELVIC ORGANS: An IUD is present within the uterus. No evidence of  free fluid or inflammatory change through the pelvis.     ADDITIONAL FINDINGS: Superficial to the left rectus sheath muscle in  the pelvis, there is a 21 x 13 mm soft tissue abnormality with some  minimal adjacent edema on image 70 of series 2. This appears to  correlate with the palpable abnormality. This is indeterminate and  could be inflammatory, reactive, or neoplastic. This likely could be  sampled percutaneously with ultrasound guidance if indicated.     MUSCULOSKELETAL: Normal.                                                                      IMPRESSION:   1.  Palpable soft tissue lesion in the left lower abdomen is in the  subcutaneous fat just superficial to the left rectus muscle. This is  indeterminate, but could likely be sampled percutaneously under  ultrasound guidance.  2.  Cholelithiasis.     NATA DUGGAN MD    Assessment and Plan:     1. Flushing reaction  2. VANESSA (generalized anxiety disorder)  3. Acne vulgaris/rosacea  4.  History of hypothyroidism on replacement    Based on her history and previous work-up unclear etiology for her ongoing flushing more than 10 years.  She has a contributing issues likely acne/rosacea, history of emotional stressors with generalized anxiety disorder.  She has a history of acquired hypothyroidism on replacement ,recent thyroid function tests are unremarkable except low TSH and her dose was reduced.  TPO and antithyroglobulin antibody was negative  ROSA ELENA was normal  Previous CT scan of the abdomen was unremarkable  Tryptase level was normal.  Unclear if she has a history of hypertension but no flash pulmonary edema, she currently takes high-dose spironolactone and also takes Effexor XR and BuSpar almost all of the  medications can cause some urticaria like side effects.  She has no other constitutional symptoms of weight loss, night sweats.  She started taking antihistamine cetirizine 10 mg daily for the last 10 days and did not make any difference in her symptoms  She has no GI symptoms specifically diarrhea or altered bowel movements  She has no history of fever  She is not in menopausal or perimenopausal age  No history of neurologic disorders causing autonomic dysfunction  She is not on any medications specifically vasodilators like calcium channel blockers or sildenafil or nitroglycerin  And not on any over-the-counter medications like niacinamide etc.  No clinical symptoms of carcinoid syndrome (diarrhea, profuse sweating, abdominal pain and/or bronchospasm etc.)    I had a lengthy discussion with the patient,  Suggested taking aspirin daily  Will get fractionated catecholamine levels if they are abnormal will get the urine catecholamine levels  Consider CT scan of head and neck  Continue Zetia take 10 mg daily  Suggested heart to discuss with prescribing dermatologist for alternate options of spironolactone and local creams  Go for repeat thyroid function tests as planned    - Vascular Medicine Referral  - Catecholamines Fractionated; Future    More than 60 minutes spent on the date of the encounter doing chart review, history and exam, documentation, and further activities as noted above.  She is new to this clinic reviewed available extensive records in the epic and updated chart    Thank you for the consultation !  This note was dictated by utilizing Dragon software copy of this note to primary care provider and referring physician    Owen Morton MD, LEONCIO, FSVM, FNLA, FACP  Vascular Medicine  Clinical Hypertension specialist  Clinical Lipidologist

## 2022-09-09 NOTE — PROGRESS NOTES
[facility]        Patient is here for a consult.     Pt is currently taking no meds that would impact our treatment plan.    /82 (BP Location: Left arm, Patient Position: Chair, Cuff Size: Adult Regular)   Pulse 89   Wt 195 lb 12.8 oz (88.8 kg)   SpO2 98%   Breastfeeding No   BMI 32.90 kg/m      The provider has been notified that the patient has no concerns.     Questions patient would like addressed today are: N/A.    Refills are needed: N/A    Has homecare services and agency name:  Muriel Aviles MA

## 2022-09-12 ENCOUNTER — TELEPHONE (OUTPATIENT)
Dept: OTHER | Facility: CLINIC | Age: 42
End: 2022-09-12

## 2022-09-12 NOTE — TELEPHONE ENCOUNTER
Follow-up to 9922      Non-fasting lab (Catecholamines Fractionated)    Results will be communicated via ipvivehart or telephone.

## 2022-09-19 ENCOUNTER — LAB (OUTPATIENT)
Dept: LAB | Facility: CLINIC | Age: 42
End: 2022-09-19
Payer: COMMERCIAL

## 2022-09-19 DIAGNOSIS — R23.2 FLUSHING REACTION: ICD-10-CM

## 2022-09-19 PROCEDURE — 99000 SPECIMEN HANDLING OFFICE-LAB: CPT

## 2022-09-19 PROCEDURE — 82384 ASSAY THREE CATECHOLAMINES: CPT | Mod: 90

## 2022-09-19 PROCEDURE — 36415 COLL VENOUS BLD VENIPUNCTURE: CPT

## 2022-09-25 LAB
CATECHOLS PLAS-IMP: ABNORMAL
DOPAMINE SERPL-MCNC: <20 PG/ML
EPINEPH PLAS-MCNC: 23 PG/ML
NOREPINEPH PLAS-MCNC: 568 PG/ML

## 2022-09-27 NOTE — RESULT ENCOUNTER NOTE
Slightly elevated catechol levels and non specific range and these are not the cause of flushing etc, continue same plan discussed in the clinic

## 2022-09-29 DIAGNOSIS — F41.9 ANXIETY: ICD-10-CM

## 2022-09-29 RX ORDER — ATENOLOL 25 MG/1
TABLET ORAL
Qty: 30 TABLET | Refills: 0 | Status: SHIPPED | OUTPATIENT
Start: 2022-09-29 | End: 2024-07-03

## 2022-11-13 DIAGNOSIS — F41.1 GAD (GENERALIZED ANXIETY DISORDER): ICD-10-CM

## 2022-11-16 ENCOUNTER — OFFICE VISIT (OUTPATIENT)
Dept: FAMILY MEDICINE | Facility: CLINIC | Age: 42
End: 2022-11-16
Payer: COMMERCIAL

## 2022-11-16 VITALS
OXYGEN SATURATION: 99 % | BODY MASS INDEX: 32.15 KG/M2 | WEIGHT: 193 LBS | HEIGHT: 65 IN | TEMPERATURE: 98.3 F | DIASTOLIC BLOOD PRESSURE: 65 MMHG | SYSTOLIC BLOOD PRESSURE: 110 MMHG | RESPIRATION RATE: 16 BRPM | HEART RATE: 85 BPM

## 2022-11-16 DIAGNOSIS — R00.2 PALPITATIONS: ICD-10-CM

## 2022-11-16 DIAGNOSIS — E03.9 ACQUIRED HYPOTHYROIDISM: Primary | ICD-10-CM

## 2022-11-16 DIAGNOSIS — R79.89 ELEVATED LFTS: ICD-10-CM

## 2022-11-16 LAB
ALBUMIN SERPL BCG-MCNC: 4.4 G/DL (ref 3.5–5.2)
ALP SERPL-CCNC: 112 U/L (ref 35–104)
ALT SERPL W P-5'-P-CCNC: 142 U/L (ref 10–35)
ANION GAP SERPL CALCULATED.3IONS-SCNC: 8 MMOL/L (ref 7–15)
AST SERPL W P-5'-P-CCNC: 75 U/L (ref 10–35)
BILIRUB SERPL-MCNC: 0.3 MG/DL
BUN SERPL-MCNC: 7.7 MG/DL (ref 6–20)
CALCIUM SERPL-MCNC: 9.5 MG/DL (ref 8.6–10)
CHLORIDE SERPL-SCNC: 100 MMOL/L (ref 98–107)
CREAT SERPL-MCNC: 0.72 MG/DL (ref 0.51–0.95)
DEPRECATED HCO3 PLAS-SCNC: 30 MMOL/L (ref 22–29)
GFR SERPL CREATININE-BSD FRML MDRD: >90 ML/MIN/1.73M2
GLUCOSE SERPL-MCNC: 100 MG/DL (ref 70–99)
POTASSIUM SERPL-SCNC: 4.5 MMOL/L (ref 3.4–5.3)
PROT SERPL-MCNC: 7.5 G/DL (ref 6.4–8.3)
SODIUM SERPL-SCNC: 138 MMOL/L (ref 136–145)
TSH SERPL DL<=0.005 MIU/L-ACNC: 0.7 UIU/ML (ref 0.3–4.2)

## 2022-11-16 PROCEDURE — 80053 COMPREHEN METABOLIC PANEL: CPT | Performed by: PHYSICIAN ASSISTANT

## 2022-11-16 PROCEDURE — 83550 IRON BINDING TEST: CPT | Performed by: PHYSICIAN ASSISTANT

## 2022-11-16 PROCEDURE — 99214 OFFICE O/P EST MOD 30 MIN: CPT | Performed by: PHYSICIAN ASSISTANT

## 2022-11-16 PROCEDURE — 87340 HEPATITIS B SURFACE AG IA: CPT | Performed by: PHYSICIAN ASSISTANT

## 2022-11-16 PROCEDURE — 83540 ASSAY OF IRON: CPT | Performed by: PHYSICIAN ASSISTANT

## 2022-11-16 PROCEDURE — 86706 HEP B SURFACE ANTIBODY: CPT | Performed by: PHYSICIAN ASSISTANT

## 2022-11-16 PROCEDURE — 84443 ASSAY THYROID STIM HORMONE: CPT | Performed by: PHYSICIAN ASSISTANT

## 2022-11-16 PROCEDURE — 86704 HEP B CORE ANTIBODY TOTAL: CPT | Performed by: PHYSICIAN ASSISTANT

## 2022-11-16 PROCEDURE — 36415 COLL VENOUS BLD VENIPUNCTURE: CPT | Performed by: PHYSICIAN ASSISTANT

## 2022-11-16 PROCEDURE — 82977 ASSAY OF GGT: CPT | Performed by: PHYSICIAN ASSISTANT

## 2022-11-16 PROCEDURE — 86803 HEPATITIS C AB TEST: CPT | Performed by: PHYSICIAN ASSISTANT

## 2022-11-16 RX ORDER — VENLAFAXINE HYDROCHLORIDE 75 MG/1
CAPSULE, EXTENDED RELEASE ORAL
Qty: 90 CAPSULE | Refills: 1 | Status: SHIPPED | OUTPATIENT
Start: 2022-11-16 | End: 2023-01-24

## 2022-11-16 ASSESSMENT — ANXIETY QUESTIONNAIRES
1. FEELING NERVOUS, ANXIOUS, OR ON EDGE: SEVERAL DAYS
GAD7 TOTAL SCORE: 5
5. BEING SO RESTLESS THAT IT IS HARD TO SIT STILL: NOT AT ALL
4. TROUBLE RELAXING: NOT AT ALL
8. IF YOU CHECKED OFF ANY PROBLEMS, HOW DIFFICULT HAVE THESE MADE IT FOR YOU TO DO YOUR WORK, TAKE CARE OF THINGS AT HOME, OR GET ALONG WITH OTHER PEOPLE?: SOMEWHAT DIFFICULT
7. FEELING AFRAID AS IF SOMETHING AWFUL MIGHT HAPPEN: SEVERAL DAYS
2. NOT BEING ABLE TO STOP OR CONTROL WORRYING: SEVERAL DAYS
7. FEELING AFRAID AS IF SOMETHING AWFUL MIGHT HAPPEN: SEVERAL DAYS
3. WORRYING TOO MUCH ABOUT DIFFERENT THINGS: SEVERAL DAYS
GAD7 TOTAL SCORE: 5
IF YOU CHECKED OFF ANY PROBLEMS ON THIS QUESTIONNAIRE, HOW DIFFICULT HAVE THESE PROBLEMS MADE IT FOR YOU TO DO YOUR WORK, TAKE CARE OF THINGS AT HOME, OR GET ALONG WITH OTHER PEOPLE: SOMEWHAT DIFFICULT
GAD7 TOTAL SCORE: 5
6. BECOMING EASILY ANNOYED OR IRRITABLE: SEVERAL DAYS

## 2022-11-16 NOTE — TELEPHONE ENCOUNTER
Gwen Zambrano:    Patient had appointment today, VANESSA was 5 today, please advise refills.      JOCE Young

## 2022-11-16 NOTE — PROGRESS NOTES
"  Assessment & Plan     ASSESSMENT/PLAN:      ICD-10-CM    1. Acquired hypothyroidism  E03.9 TSH with free T4 reflex     TSH with free T4 reflex     levothyroxine (SYNTHROID/LEVOTHROID) 112 MCG tablet      2. Palpitations  R00.2 Comprehensive metabolic panel (BMP + Alb, Alk Phos, ALT, AST, Total. Bili, TP)     Adult Leadless EKG Monitor 3 to 7 Days     Comprehensive metabolic panel (BMP + Alb, Alk Phos, ALT, AST, Total. Bili, TP)      3. Elevated LFTs  R79.89 US Abdomen Limited     Hepatic function panel     Iron & Iron Binding Capacity     GGT     Hepatitis C antibody     Hepatitis B surface antigen     Hepatitis B Surface Antibody     Hepatitis B core antibody        Some symptoms of hypo and hyper thyroidism. Will treat to close to TSH 1, we discussed this.     Palpitations: likely benign / related to stress. However cardiac monitoring ordered to make sure.    Will recheck mildly elevated LFTs - patient does not use tylenol/no regular alcohol. Unknown cause. Further workup recommended.    BMI:   Estimated body mass index is 32.47 kg/m  as calculated from the following:    Height as of this encounter: 1.642 m (5' 4.65\").    Weight as of this encounter: 87.5 kg (193 lb).   Weight management plan: Discussed healthy diet and exercise guidelines    Return in about 6 months (around 5/16/2023) for Physical Exam.    Gwen Zambrano PA-C  [facility] BASIA Garber is a 42 year old, presenting for the following health issues:  Recheck Medication      History of Present Illness       Mental Health Follow-up:  Patient presents to follow-up on Anxiety.    Patient's anxiety since last visit has been:  Good  The patient is not having other symptoms associated with anxiety.  Any significant life events: job concerns  Patient is not feeling anxious or having panic attacks.  Patient has no concerns about alcohol or drug use.    Hypothyroidism:     Since last visit, patient describes the following " "symptoms::  Anxiety, Fatigue and Weight gain    Weight gain::  6-10 lbs.    She eats 4 or more servings of fruits and vegetables daily.She consumes 1 sweetened beverage(s) daily.She exercises with enough effort to increase her heart rate 10 to 19 minutes per day.  She exercises with enough effort to increase her heart rate 3 or less days per week.   She is taking medications regularly.  Today's VANESSA-7 Score: 5       Hypothyroidism Follow-up      Since last visit, patient describes the following symptoms: anxiety, palpitations, sweating, weight gain    She is sweating more - like hot flashes. Hot at night too.   Palpitations seem random - anxiety induced? Sometimes feels a little dizzy/hot. Feels like heart racing, lasts 5 minutes. Doesn't check pulse. Occur a couple times a week. This is not new.  No periods with IUD     About 20 lb gradual weight gain the past 2 years  Started family weight loss challenge til January - juicing, low calorie for past 1.5 weeks, with no weight loss. Frustrating.    Review of Systems   Other than noted above, general, HEENT, respiratory, cardiac, MS, and gastrointestinal systems are negative.       Objective    /65 (BP Location: Right arm, Patient Position: Sitting, Cuff Size: Adult Large)   Pulse 85   Temp 98.3  F (36.8  C) (Tympanic)   Resp 16   Ht 1.642 m (5' 4.65\")   Wt 87.5 kg (193 lb)   LMP  (Exact Date)   SpO2 99%   BMI 32.47 kg/m    Body mass index is 32.47 kg/m .  Physical Exam   GENERAL: healthy, alert and no distress  NECK: no adenopathy, no asymmetry, masses, or scars and thyroid normal to palpation  RESP: lungs clear to auscultation - no rales, rhonchi or wheezes  CV: regular rate and rhythm, normal S1 S2, no S3 or S4, no murmur, click or rub, no peripheral edema and peripheral pulses strong  MS: no gross musculoskeletal defects noted, no edema            "

## 2022-11-17 LAB
GGT SERPL-CCNC: 143 U/L (ref 5–36)
HBV CORE AB SERPL QL IA: NONREACTIVE
HBV SURFACE AB SERPL IA-ACNC: 0 M[IU]/ML
HBV SURFACE AB SERPL IA-ACNC: NONREACTIVE M[IU]/ML
HBV SURFACE AG SERPL QL IA: NONREACTIVE
HCV AB SERPL QL IA: NONREACTIVE
IRON BINDING CAPACITY (ROCHE): 304 UG/DL (ref 240–430)
IRON SATN MFR SERPL: 33 % (ref 15–46)
IRON SERPL-MCNC: 99 UG/DL (ref 37–145)

## 2022-11-17 RX ORDER — LEVOTHYROXINE SODIUM 112 UG/1
112 TABLET ORAL DAILY
Qty: 90 TABLET | Refills: 1 | Status: SHIPPED | OUTPATIENT
Start: 2022-11-17 | End: 2023-08-29

## 2022-11-21 ENCOUNTER — HEALTH MAINTENANCE LETTER (OUTPATIENT)
Age: 42
End: 2022-11-21

## 2022-11-30 ENCOUNTER — HOSPITAL ENCOUNTER (OUTPATIENT)
Dept: CARDIOLOGY | Facility: CLINIC | Age: 42
Discharge: HOME | End: 2022-11-30
Attending: PHYSICIAN ASSISTANT
Payer: COMMERCIAL

## 2022-11-30 ENCOUNTER — HOSPITAL ENCOUNTER (OUTPATIENT)
Dept: ULTRASOUND IMAGING | Facility: CLINIC | Age: 42
Discharge: HOME | End: 2022-11-30
Attending: PHYSICIAN ASSISTANT
Payer: COMMERCIAL

## 2022-11-30 DIAGNOSIS — R00.2 PALPITATIONS: ICD-10-CM

## 2022-11-30 DIAGNOSIS — R79.89 ELEVATED LFTS: ICD-10-CM

## 2022-11-30 PROCEDURE — 93242 EXT ECG>48HR<7D RECORDING: CPT

## 2022-11-30 PROCEDURE — 76705 ECHO EXAM OF ABDOMEN: CPT

## 2022-11-30 PROCEDURE — 93244 EXT ECG>48HR<7D REV&INTERPJ: CPT | Performed by: INTERNAL MEDICINE

## 2023-01-31 NOTE — LETTER
9/21/2020         RE: Shirlene Rodriguez  [address] [place] [address]        Dear Colleague,    Thank you for referring your patient, Shirlene Rodriguez, to the [facility]. Please see a copy of my visit note below.    Chief Complaint   Patient presents with     Skin Check       Vitals:    09/21/20 1231   BP: 116/78   BP Location: Right arm   Patient Position: Sitting   Cuff Size: Adult Regular   Pulse: 83   Resp: 20   Temp: 99  F (37.2  C)   TempSrc: Tympanic     Wt Readings from Last 1 Encounters:   09/16/20 69.8 kg (153 lb 14.1 oz)       Jovanna WHYTE RN   Specialty Clinics       HPI:   Shirlene Rodriguez is a 40 year old female who presents for Full skin cancer screening and for recheck of acne on the face. Overall acne has been doing well, but she continues to flush and have background redness.   Last Skin Exam: 2 years ago      1st Baseline: No  Personal HX of Skin Cancer: no   Personal HX of Malignant Melanoma: no   Family HX of Skin Cancer / Malignant Melanoma: no  Personal HX of Atypical Moles:  Yes moderately atypical nevus 2018  Risk factors: regular sun exposure  New / Changing lesions: no  Social History: works at [employer]  On review of systems, there are no further skin complaints, patient is feeling otherwise well.  See patient intake sheet.  ROS of the following were done and are negative: Constitutional, Eyes, Ears, Nose,   Mouth, Throat, Cardiovascular, Respiratory, GI, Genitourinary, Musculoskeletal,   Psychiatric, Endocrine, Allergic/Immunologic.    HPI, past medical history, social history, allergies, medications reviewed as of September 21, 2020      PHYSICAL EXAM:   /78 (BP Location: Right arm, Patient Position: Sitting, Cuff Size: Adult Regular)   Pulse 83   Temp 99  F (37.2  C) (Tympanic)   Resp 20   Skin exam performed as follows: Type 2 skin. Mood appropriate  Alert and Oriented X 3. Well developed, well nourished in no distress.  General appearance:  Normal  Head including face: Normal  Eyes: conjunctiva and lids: Normal  Mouth: Lips, teeth, gums: Normal  Neck: Normal  Chest-breast/axillae: Normal  Back: Normal  Spleen and liver: Normal  Cardiovascular: Exam of peripheral vascular system by observation for swelling, varicosities, edema: Normal  Genitalia: groin, buttocks: Normal  Extremities: digits/nails (clubbing): Normal  Eccrine and Apocrine glands: Normal  Right upper extremity: Normal  Left upper extremity: Normal  Right lower extremity: Normal  Left lower extremity: Normal  Skin: Scalp and body hair: See below    Pt deferred exam of breasts, groin, buttocks: No    Other physical findings:  1. Multiple pigmented macules on extremities and trunk  2. Multiple pigmented macules on face, trunk and extremities  3. Multiple vascular papules on trunk, arms and legs  4. Multiple scattered keratotic plaques       Except as noted above, no other signs of skin cancer or melanoma.     ASSESSMENT/PLAN:   Benign Full skin cancer screening today. . Patient with history of a moderately atypical nevus in 2018  Advised on monthly self exams and 1 year  Patient Education: Appropriate brochures given.    Multiple benign appearing nevi on arms, legs and trunk. Discussed ABCDEs of melanoma and sunscreen.   Multiple lentigos on arms, legs and trunk. Advised benign, no treatment needed.  Multiple scattered angiomas. Advised benign, no treatment needed.   Seborrheic keratosis on arms, legs and trunk. Advised benign, no treatment needed.  Acne Vulgaris - Doing great on spironolactone and tretinoin; would like to control background redness.   --Continue spironolactone 150 mg daily. Advised on potential for hypotension, teratogenetic effects, menstrual irregularities, hyperkalemia and need for Q 6 month K+ checks.  --Start Rhofade QAM  --Continue tretinoin QHS        Follow-up: yearly if doing well/PRN sooner    1.) Patient was asked about new and changing moles. YES  2.) Patient  received a complete physical skin examination: YES  3.) Patient was counseled to perform a monthly self skin examination: YES  Scribed By: Sasha Hughes, MS, PAHueyC      Again, thank you for allowing me to participate in the care of your patient.        Sincerely,        Sasha Hughes PA-C     Statement Selected

## 2023-02-14 DIAGNOSIS — L70.0 ACNE VULGARIS: ICD-10-CM

## 2023-02-14 RX ORDER — SPIRONOLACTONE 100 MG/1
TABLET, FILM COATED ORAL
Qty: 90 TABLET | Refills: 0 | Status: SHIPPED | OUTPATIENT
Start: 2023-02-14 | End: 2023-05-15

## 2023-02-14 RX ORDER — SPIRONOLACTONE 50 MG/1
50 TABLET, FILM COATED ORAL DAILY
Qty: 90 TABLET | Refills: 0 | Status: SHIPPED | OUTPATIENT
Start: 2023-02-14 | End: 2023-05-15

## 2023-02-14 NOTE — LETTER
[facility]  [address]  [facility] [address]  Phone: [phone]    2023    Shirlene Rodriguez                                                                                                        [address] [place] [address]            Dear Ms. Rodriguez,    We recently provided you with a medication refill. Prescription medications require routine follow-up appointments with your Dermatology Provider.      Per [facility] medication refill protocol, you do need to be seen at least annually to renew a prescription while on prescribed medication(s). A prescription is valid for only one year before it expires.      At this time we ask that: You schedule a routine follow up Dermatology office visit to follow your Acne and renew your now  Spironolactone prescription.    Your prescription: Has  as it is over 1 year old. You have been given a one time jannette refill of medication.     We are currently booking Dermatology appointments into , so please schedule follow up Dermatology appointment as soon as possible to avoid going without medication.    [phone] to schedule or you may schedule via My chart. You may be seen for follow up with any of our 3 Dermatology Providers via telephone or virtual video if you prefer.     Thank you,      Sasha BRADLEY / prem

## 2023-02-14 NOTE — TELEPHONE ENCOUNTER
> 1 year since seen. Needs appointment. Letter sent and note sent to pharmacy as well. Ingrid Gunter RN

## 2023-02-14 NOTE — TELEPHONE ENCOUNTER
Last Written Prescription Date:    Last Fill Quantity: ,  # refills:    Last office visit: 2022 with prescribing provider:     Future Office Visit:        Requested Prescriptions   Pending Prescriptions Disp Refills     spironolactone (ALDACTONE) 100 MG tablet 90 tablet 3     Si tab po daily       There is no refill protocol information for this order        spironolactone (ALDACTONE) 50 MG tablet 90 tablet 3     Sig: Take 1 tablet (50 mg) by mouth daily       There is no refill protocol information for this order

## 2023-04-20 ENCOUNTER — PATIENT OUTREACH (OUTPATIENT)
Dept: CARE COORDINATION | Facility: CLINIC | Age: 43
End: 2023-04-20
Payer: COMMERCIAL

## 2023-05-15 ENCOUNTER — OFFICE VISIT (OUTPATIENT)
Dept: FAMILY MEDICINE | Facility: CLINIC | Age: 43
End: 2023-05-15
Payer: COMMERCIAL

## 2023-05-15 VITALS
HEART RATE: 97 BPM | BODY MASS INDEX: 32.01 KG/M2 | DIASTOLIC BLOOD PRESSURE: 77 MMHG | HEIGHT: 65 IN | OXYGEN SATURATION: 99 % | TEMPERATURE: 98.7 F | SYSTOLIC BLOOD PRESSURE: 120 MMHG | WEIGHT: 192.1 LBS | RESPIRATION RATE: 15 BRPM

## 2023-05-15 DIAGNOSIS — M25.561 BILATERAL ANTERIOR KNEE PAIN: ICD-10-CM

## 2023-05-15 DIAGNOSIS — E66.9 OBESITY (BMI 30-39.9): ICD-10-CM

## 2023-05-15 DIAGNOSIS — Z00.00 ROUTINE GENERAL MEDICAL EXAMINATION AT A HEALTH CARE FACILITY: Primary | ICD-10-CM

## 2023-05-15 DIAGNOSIS — K76.0 FATTY LIVER: ICD-10-CM

## 2023-05-15 DIAGNOSIS — E03.9 ACQUIRED HYPOTHYROIDISM: ICD-10-CM

## 2023-05-15 DIAGNOSIS — F41.1 GAD (GENERALIZED ANXIETY DISORDER): ICD-10-CM

## 2023-05-15 DIAGNOSIS — F41.9 ANXIETY: ICD-10-CM

## 2023-05-15 DIAGNOSIS — M25.562 BILATERAL ANTERIOR KNEE PAIN: ICD-10-CM

## 2023-05-15 DIAGNOSIS — R79.89 ELEVATED LFTS: ICD-10-CM

## 2023-05-15 DIAGNOSIS — Z12.4 CERVICAL CANCER SCREENING: ICD-10-CM

## 2023-05-15 LAB
ALBUMIN SERPL BCG-MCNC: 4.7 G/DL (ref 3.5–5.2)
ALP SERPL-CCNC: 118 U/L (ref 35–104)
ALT SERPL W P-5'-P-CCNC: 92 U/L (ref 10–35)
AST SERPL W P-5'-P-CCNC: 44 U/L (ref 10–35)
BASOPHILS # BLD AUTO: 0 10E3/UL (ref 0–0.2)
BASOPHILS NFR BLD AUTO: 0 %
BILIRUB DIRECT SERPL-MCNC: <0.2 MG/DL (ref 0–0.3)
BILIRUB SERPL-MCNC: 0.3 MG/DL
EOSINOPHIL # BLD AUTO: 0 10E3/UL (ref 0–0.7)
EOSINOPHIL NFR BLD AUTO: 0 %
ERYTHROCYTE [DISTWIDTH] IN BLOOD BY AUTOMATED COUNT: 12.3 % (ref 10–15)
FERRITIN SERPL-MCNC: 121 NG/ML (ref 6–175)
HCT VFR BLD AUTO: 43.2 % (ref 35–47)
HGB BLD-MCNC: 14.7 G/DL (ref 11.7–15.7)
IMM GRANULOCYTES # BLD: 0 10E3/UL
IMM GRANULOCYTES NFR BLD: 0 %
LYMPHOCYTES # BLD AUTO: 2.5 10E3/UL (ref 0.8–5.3)
LYMPHOCYTES NFR BLD AUTO: 32 %
MCH RBC QN AUTO: 28.5 PG (ref 26.5–33)
MCHC RBC AUTO-ENTMCNC: 34 G/DL (ref 31.5–36.5)
MCV RBC AUTO: 84 FL (ref 78–100)
MONOCYTES # BLD AUTO: 0.5 10E3/UL (ref 0–1.3)
MONOCYTES NFR BLD AUTO: 7 %
NEUTROPHILS # BLD AUTO: 4.6 10E3/UL (ref 1.6–8.3)
NEUTROPHILS NFR BLD AUTO: 60 %
PLATELET # BLD AUTO: 255 10E3/UL (ref 150–450)
PROT SERPL-MCNC: 7.7 G/DL (ref 6.4–8.3)
RBC # BLD AUTO: 5.16 10E6/UL (ref 3.8–5.2)
TSH SERPL DL<=0.005 MIU/L-ACNC: 0.33 UIU/ML (ref 0.3–4.2)
WBC # BLD AUTO: 7.7 10E3/UL (ref 4–11)

## 2023-05-15 PROCEDURE — 85025 COMPLETE CBC W/AUTO DIFF WBC: CPT | Performed by: PHYSICIAN ASSISTANT

## 2023-05-15 PROCEDURE — 87624 HPV HI-RISK TYP POOLED RSLT: CPT | Performed by: PHYSICIAN ASSISTANT

## 2023-05-15 PROCEDURE — 36415 COLL VENOUS BLD VENIPUNCTURE: CPT | Performed by: PHYSICIAN ASSISTANT

## 2023-05-15 PROCEDURE — 99396 PREV VISIT EST AGE 40-64: CPT | Performed by: PHYSICIAN ASSISTANT

## 2023-05-15 PROCEDURE — 84443 ASSAY THYROID STIM HORMONE: CPT | Performed by: PHYSICIAN ASSISTANT

## 2023-05-15 PROCEDURE — 99214 OFFICE O/P EST MOD 30 MIN: CPT | Mod: 25 | Performed by: PHYSICIAN ASSISTANT

## 2023-05-15 PROCEDURE — G0145 SCR C/V CYTO,THINLAYER,RESCR: HCPCS | Performed by: PHYSICIAN ASSISTANT

## 2023-05-15 PROCEDURE — 82728 ASSAY OF FERRITIN: CPT | Performed by: PHYSICIAN ASSISTANT

## 2023-05-15 PROCEDURE — 80076 HEPATIC FUNCTION PANEL: CPT | Performed by: PHYSICIAN ASSISTANT

## 2023-05-15 RX ORDER — BUSPIRONE HYDROCHLORIDE 30 MG/1
30 TABLET ORAL DAILY
Qty: 90 TABLET | Refills: 3 | Status: SHIPPED | OUTPATIENT
Start: 2023-05-15 | End: 2024-04-29

## 2023-05-15 RX ORDER — VENLAFAXINE HYDROCHLORIDE 225 MG/1
225 TABLET, EXTENDED RELEASE ORAL DAILY
Qty: 90 TABLET | Refills: 3 | Status: SHIPPED | OUTPATIENT
Start: 2023-05-15 | End: 2024-04-29

## 2023-05-15 RX ORDER — BUSPIRONE HYDROCHLORIDE 30 MG/1
TABLET ORAL
Qty: 90 TABLET | Refills: 3 | OUTPATIENT
Start: 2023-05-15

## 2023-05-15 RX ORDER — VENLAFAXINE HYDROCHLORIDE 75 MG/1
CAPSULE, EXTENDED RELEASE ORAL
Qty: 90 CAPSULE | Refills: 0 | OUTPATIENT
Start: 2023-05-15

## 2023-05-15 ASSESSMENT — ENCOUNTER SYMPTOMS
NERVOUS/ANXIOUS: 0
PALPITATIONS: 0
HEMATURIA: 0
HEMATOCHEZIA: 0
WEAKNESS: 0
SHORTNESS OF BREATH: 0
CHILLS: 0
FEVER: 0
PARESTHESIAS: 0
HEARTBURN: 0
DIARRHEA: 0
EYE PAIN: 0
BREAST MASS: 0
MYALGIAS: 0
CONSTIPATION: 0
NAUSEA: 0
JOINT SWELLING: 0
ABDOMINAL PAIN: 0
ARTHRALGIAS: 1
SORE THROAT: 0
HEADACHES: 0
FREQUENCY: 0
DIZZINESS: 0
COUGH: 0
DYSURIA: 0

## 2023-05-15 NOTE — PATIENT INSTRUCTIONS
Dr. Weber and Giovanna Escoto for medical weight management, referral    Knees: physical therapy. Mini squats, mini lunges, straight leg raise    The scan shows some fat infiltrates on the liver This is a common finding on scans: weight loss, Mediterranean style diet (high in healthy fats, low in unhealthy fats) can help this and prevent this from getting worse over time. This can cause mild increases in liver function tests.      Nonalcoholic fatty liver disease and obesity: An Obesity Medicine Association (BRANT) Clinical Practice Statement (CPS) 2022    [email or web address]    1.6. NAFLD cause: obesity and adiposopathy  Fatty liver disease most often occurs due to multiple insults in genetically or epigenetically predisposed individuals [46,47]. Genetic causality is supported by family history of NAFLD being a risk factor for NAFLD, as well as identifiable allele variants associated with NAFLD [3]. The ectopic fat deposition in patients with congenital or acquired lipodystrophy is another cause of NAFLD [3]. Among the most common exogenous cause of fatty liver is excessive alcohol consumption. Common secondary disorders that contribute to, or that often accompany NAFLD include: [3,21,23,33,48].   Adiposopathy with obesity-related immunopathies, endocrinopathies, and the increased circulation of free fatty acids may contribute to  ectopic  deposition of free fatty acids into the liver as well as other body tissues such as muscles, pancreas, and kidneys [[49], [50], [51]].  Physical inactivity and unhealthful nutrition (e.g., high intake of saturated fats and processed carbohydrates) [52] in genetically susceptible individuals [47,53].  Type 2 diabetes mellitus  Insulin resistance  Components of the metabolic syndrome:    ?Abdominal  obesity    ?Hyperglycemia (especially uncontrolled diabetes mellitus)    ?High blood pressure    ?Dyslipidemia (especially hypertriglyceridemia)  Increase in visceral fat  Poor sleep & sleep apnea: obstructive sleep apnea (ALEX) may increase hepatic fat via promotion of insulin resistance, as well as due to hypoxia, inflammation, endotoxemia, and gut barrier dysfunction.  Hepatitis (Hepatitis C infection)  Polycystic ovarian syndrome  Cardiovascular disease  Chronic kidney disease      1.7. NAFLD cause: concomitant medications  Concomitant medications may contribute to NAFLD and should be reviewed when assessing potential causes of NAFLD. These medications include [21,54]:  Allopurinol (As long as allopurinol does not cause hepatic injury, reducing elevated uric acid with allopurinol may reduce NAFLD [54]).  Alpha methyldopa  Amiodarone  Some antipsychotics  Some antidepressants  Aspirin with Reye syndrome  Corticosteroids (systemic)  Halothane  Highly active antiretroviral therapy (HAART)  Isoniazid  Lomitapide  Methotrexate  Possibly nonsteroidal anti-inflammatory drugs: Data is reportedly inconsistent [55].  Tamoxifen  Tetracycline  Valproate      1.8. NAFLD cause: uncommon causes  The following are more uncommon causes of NAFLD [14,21].  Autoimmune hepatitis  Celiac disease  Cholesterol taqueria storage disease  Citrin deficiency  Disorders of lipid metabolism (e.g., abetalipoproteinemia, hypolipoproteinemia, familial combined hyperlipidemia)  Environmental toxicity, including some industrial solvents  Glycogen storage disease  Hypothyroidism [56].  Lipodystrophy  Lysosomal acid lipase deficiency (Wolman disease)  Mauriac syndrome  Mitochondrial defects in fatty acid oxidation  Peroxisome dysfunction  Pregnancy (including HELLP - hemolysis, elevated liver enzymes, low platelet count)  Reye syndrome  Starvation and malnutrition  Surgical rapid weight loss (i.e., bariatric surgery)  Total parenteral  nutrition  Quinteros-Alevism syndrome  Philip's disease      One of the primary mechanisms whereby obesity can cause NAFLD is through the adiposopathic increase in free fatty acids (Fig. 2). Increased circulating free fatty acids can lead to hepatic fat deposition, ballooning of hepatocytes, which in turn may lead to hepatocyte injury/death, inflammation, and fibroblast recruitment with end result of fibrosis/cirrhosis [15,16]. Hepatosteatosis or NAFL is fatty liver defined as ?5% hepatic fat; hepatosteatitis or MARTIN is fatty liver with inflammation, hepatocyte injury, with or without fibrosis [10,15,16] (Fig. 1). During positive caloric balance, impaired uptake of energy in  sick  peripheral subcutaneous adipose tissue may lead to increased circulating free fatty acids and  ectopic  and pathogenic deposition of free fatty acids into the liver and other body tissues (e.g., muscle, pancreas, kidneys) [49,50]. Fig. 2 shows the relationships between free fatty acids and NAFLD. Fig. 3 shows the relationship of lipotoxicity to fatty liver and dyslipidemia.  1.9. NAFLD causes: high fructose corn syrup (versus natural fruit intake)  A fruit is a plant that contains seeds and fiber whose carbohydrate content is often approximately 50% fructose and 50% glucose. Glucose is a simple sugar monosaccharide found in animals and plants with a glycemic index (GI) of 100. Fructose is also a monosaccharide with a glycemic index of GI of 25. The GI of fruit reflect the mixed effect of glucose and fructose, with many citrus fruits having a glycemic index (GI)?<?50 [63]. As a frame of reference, table sugar or sucrose (i.e., disaccharide of glucose and fructose derived from sugar cane or sugar beets) has a GI of 65 [64]. Epidemiological data suggest that sucrose and high fructose corn syrup (HFCS), the two most common added sugars to foods and drinks, are not only potentially obesogenic, but also associated with fatty liver, dyslipidemia,  insulin resistance, hyperuricemia, cardiovascular disease, type 2 diabetes mellitus, often independent of body weight gain or total energy intake [65].    HFCS is a sweetener originally processed from corn starch, with starch being a chain of glucose molecules used as a plant storage form of carbohydrates [66]. Corn starch is broken down into 100% glucose syrup, with syrup being defined as a liquid containing dissolved sugar. Enzymes are then added to convert some of the glucose into fructose. Fructose taste sweeter than sucrose, with both fructose and sucrose tasting sweeter than glucose. The result is a syrup that is a processed sweetener additive containing higher concentrations of fructose than found in the pure glucose found in corn syrup. Hence the name  high fructose corn syrup.     Most HFCS is approximately 50% fructose and 50% glucose with a GI of ?70 [67]. Sucrose (table sugar) is solid and contains covalently bound 50% glucose and 50% fructose with a GI of 65 [67]. While unclear if HFCS and fruit juices (consumed as juice, and not in whole fruit) have the same metabolic consequences, the free sugars content are similar, as are their respective GI [68]. Regarding differences between HFCS and sucrose, HFCS is a liquid originally derived from corn starch, and sucrose is a solid derived from sugar cane and sugar beets. Excessive intake of HFCS and other refined sugars can contribute to obesity, fatty liver disease, hypertriglyceridemia, and diabetes mellitus [67,69,70]. It is challenging to determine the relative pathogenic contributions of the fructose versus glucose components of HFCS when compared to high intakes of other sugars. However, fructose is a potent inducer of lipogenic enzyme expression with the enhanced fatty acid synthesis resulting in increased hepatic diacylglycerols thought to directly interfere with insulin signaling. Fructose may also drive hepatic gluconeogenesis [71].    In contrast to  HFCS (i.e., a processed carbohydrate sweetener), natural whole fruit may not be obesogenic [72]. Natural whole fruits with fiber and fructose are more healthful than processed HFCS having no fiber. The clinical consequence regarding the liver is that the HFCS found in candy, processed sweets, soda, fruit juices, and other processed foods is an important cause of NAFLD. HFCS typically has a higher concentration of sugar than most fruit and elicits more rapid intestinal absorption and transport to the liver. Animal studies suggest among the most deleterious macronutrient consumption causing NAFLD is the intake of both saturated fats and liquid fructose [73]. In short, HFCS is a common component of processed carbohydrates, which may be obesogenic and contribute to NAFLD, particularly when accompanied by increased saturated fat intake. Although they contain fructose, consumption of unprocessed natural whole fruit (not fruit juices) is unlikely to be obesogenic and is not thought to substantially contribute to NAFLD [74].    An overall theme is the least healthful macronutrient dietary intake that is most likely to promote NAFLD among patients with obesity includes: [75].  Sugared drinks (sodas)  Fruit juices  Red meat  Processed meat  Saturated fats  Energy dense processed  junk food,  cakes, and biscuits    Dietary intake least likely to promote NAFLD among patients at a healthy body weight includes:  Whole grains  Lean meats  Plant based sources of protein, fruits and vegetables [76].  Healthful dietary patterns, such as the Mediterranean diet and Dietary Approaches to Stop Hypertension (DASH) diet [77,78].      1.10. NAFLD treatment: overview and objectives  Treatments for NAFLD are centered around nutrition, physical activity, and medications. Generally, excessive alcohol intake [e.g., >2 drinks daily (24 oz beer, 8 oz wine, or 2 oz spirits)] is associated with increased risk of alcohol-associated liver disease and  cirrhosis. Conversely, moderate ethanol intake (i.e., 1-2 drinks daily) may reduce the risk of MARTIN and CVD. In patients with established MARTIN, however, all alcohol consumption should be avoided [3].    Regarding treatment of NAFLD and MARTIN, the objectives should include: [3].  Preservation of liver function  Preventing progression to end-stage liver disease  Preventing hepatocellular carcinoma  Preventing metabolic complications (e.g., diabetes mellitus, dyslipidemia, metabolic syndrome), which are cardiovascular disease risk factors      1.11. NAFLD treatment: nutrition  For patients with NAFLD, medical nutrition therapy includes an evidenced-based meal plan that helps achieve a healthy body weight, limits saturated and trans fats and ultra-processed/refined carbohydrates [79]. Potential options include the Mediterranean diet with moderate of lean protein (plant or animal based) or other processed carbohydrate/saturated fat restricted nutritional interventions [3,6,77,78]. Among patients with overweight or obesity, weight loss of 3-5% may improve hepatic steatosis with weight loss of 7-10% usually needed to improve histopathological features of MARTIN (e.g., fibrosis) [[22], [23], [24]].    1.12. NAFLD treatment: Dynamic ( aerobic ) and resistance physical activity  Dynamic (aerobic) and resistance-based physical activity help patients achieve and maintain a healthy body weight. Physical activity also increases peripheral insulin sensitivity and reduces circulating free fatty acids and glucose, which reduces their delivery to the liver [80,81]. Lastly, physical activity increases intrahepatic fatty acid oxidation, decreases fatty acid synthesis, and helps prevent mitochondrial and hepatocellular damage [23,80], which may have therapeutic benefits in treating NAFLD, with favorable effects that may be independent of weight loss [3].    1.13. NAFLD treatment: weight reduction in patients with pre-obesity/obesity  Among  patients with pre-obesity/obesity, weight reduction of ?10% achieved by healthful nutrition (i.e., Mediterranean diet versus low fat diet) and enhanced energy expenditure (i.e., routine physical activity) can potentially improve NAFLD and MARTIN over a relatively short period of time [3].    Preventive Health Recommendations  Female Ages 40 to 49    Yearly exam:   See your health care provider every year in order to  Review health changes.   Discuss preventive care.    Review your medicines if your doctor prescribed any.    Get a Pap test every three years (unless you have an abnormal result and your provider advises testing more often).    If you get Pap tests with HPV test, you only need to test every 5 years, unless you have an abnormal result. You do not need a Pap test if your uterus was removed (hysterectomy) and you have not had cancer.    You should be tested each year for STDs (sexually transmitted diseases), if you're at risk.   Ask your doctor if you should have a mammogram.    Have a colonoscopy (test for colon cancer) if someone in your family has had colon cancer or polyps before age 50.     Have a cholesterol test every 5 years.     Have a diabetes test (fasting glucose) after age 45. If you are at risk for diabetes, you should have this test every 3 years.    Shots: Get a flu shot each year. Get a tetanus shot every 10 years.     Nutrition:   Eat at least 5 servings of fruits and vegetables each day.  Eat whole-grain bread, whole-wheat pasta and brown rice instead of white grains and rice.  Get adequate Calcium and Vitamin D.      Lifestyle  Exercise at least 150 minutes a week (an average of 30 minutes a day, 5 days a week). This will help you control your weight and prevent disease.  Limit alcohol to one drink per day.  No smoking.   Wear sunscreen to prevent skin cancer.  See your dentist every six months for an exam and cleaning.

## 2023-05-15 NOTE — PROGRESS NOTES
SUBJECTIVE:   CC: Shirlene is an 43 year old who presents for preventive health visit.   Patient has been advised of split billing requirements and indicates understanding: Yes  Healthy Habits:     Getting at least 3 servings of Calcium per day:  Yes    Bi-annual eye exam:  Yes    Dental care twice a year:  Yes    Sleep apnea or symptoms of sleep apnea:  Daytime drowsiness    Diet:  Regular (no restrictions)    Frequency of exercise:  2-3 days/week    Duration of exercise:  15-30 minutes    Taking medications regularly:  Yes    Medication side effects:  None    PHQ-2 Total Score: 0    Additional concerns today:  Yes    *  Ridges on nails, not sure if she is deficient in something  *  She bought a supplement [employer] Baird is this ok to take with her other medications    Has daytime fatigue, around noon-1. Drinks caffeine in AM. Sleeps around 8 hours. Wakes up rested, not snoring.    Organics Nature Khmer Sea Baird   Khmer Baird Powder, bladderwrack powder, burdock extract     Bilateral anterior knee pain, especially with stairs. Once seemed to give out and was really painful.     Today's PHQ-2 Score:       5/15/2023     1:37 PM   PHQ-2 ( 1999 Pf[employer])   Q1: Little interest or pleasure in doing things 0   Q2: Feeling down, depressed or hopeless 0   PHQ-2 Score 0   Q1: Little interest or pleasure in doing things Not at all    Not at all   Q2: Feeling down, depressed or hopeless Not at all    Not at all   PHQ-2 Score 0    0           Social History     Tobacco Use     Smoking status: Never     Smokeless tobacco: Never   Vaping Use     Vaping status: Never Used   Substance Use Topics     Alcohol use: Yes     Comment: rare             5/15/2023     1:37 PM   Alcohol Use   Prescreen: >3 drinks/day or >7 drinks/week? No          View : No data to display.              Reviewed orders with patient.  Reviewed health maintenance and updated orders accordingly - Yes  Lab work is in process  Labs reviewed in EPIC  BP Readings  from Last 3 Encounters:   05/15/23 120/77   22 110/65   22 119/82    Wt Readings from Last 3 Encounters:   05/15/23 87.1 kg (192 lb 1.6 oz)   22 87.5 kg (193 lb)   22 88.8 kg (195 lb 12.8 oz)                  Patient Active Problem List   Diagnosis     VANESSA (generalized anxiety disorder)     GERD (gastroesophageal reflux disease)     History of      Acne     Acquired hypothyroidism     mirena IUD 2020     Gallstones     Elevated LFTs     Fatty liver     Obesity (BMI 30-39.9)     Past Surgical History:   Procedure Laterality Date     CARDIAC STRESS TST,COMPLETE  2009    Normal      SECTION  2012    Procedure:  SECTION;   SECTION;  Surgeon: Warner Ac MD;  [facility]      SECTION, TUBAL LIGATION, COMBINED N/A 2016    Procedure: COMBINED  SECTION, TUBAL LIGATION;  Surgeon: Warner Ac MD;  [facility]     GYN SURGERY  2012    C section     LAPAROSCOPIC CHOLECYSTECTOMY N/A 10/06/2020    Procedure: laparoscopic cholecystectomy and removal of abdominal wall mass;  Surgeon: Dipak Cornelius MD;  Location: [facility]     SURGICAL HISTORY OF -   2004    root canal       Social History     Tobacco Use     Smoking status: Never     Smokeless tobacco: Never   Vaping Use     Vaping status: Never Used   Substance Use Topics     Alcohol use: Yes     Comment: rare     Family History   Problem Relation Age of Onset     Alcohol/Drug Mother         alcoholic     Pulmonary Embolism Mother      Arthritis Mother      Lung Cancer Mother         with mets     Chronic Obstructive Pulmonary Disease Mother      Prostate Cancer Father 65        with mets to bones/lung     Snoring Sister      Kidney Disease Sister      Myocardial Infarction Paternal Grandmother      Alzheimer Disease Paternal Grandfather      Breast Cancer No family hx of      Colon Cancer No family hx of            Breast Cancer Screening:         2022    10:53 AM 2022     3:58 PM   Breast CA Risk Assessment (FHS-7)   Do you have a family history of breast, colon, or ovarian cancer? No / Unknown No / Unknown       Mammogram Screening - Offered annual screening and updated Health Maintenance for mutual plan based on risk factor consideration    Pertinent mammograms are reviewed under the imaging tab.    History of abnormal Pap smear:   She thinks may have had abnormal years ago; never needed colposcopy/biopsy      Latest Ref Rng & Units 2018    12:57 PM 2018    12:00 PM 2014    12:00 AM   PAP / HPV   PAP (Historical)  NIL    NIL     HPV 16 DNA NEG^Negative  Negative      HPV 18 DNA NEG^Negative  Negative      Other HR HPV NEG^Negative  Negative        Reviewed and updated as needed this visit by clinical staff   Tobacco  Allergies  Meds   Med Hx  Surg Hx  Fam Hx  Soc Hx        Reviewed and updated as needed this visit by Provider         Fam Hx         Past Medical History:   Diagnosis Date     Abnormal Pap smear of cervix 2011     Acne      Allergies      Anxiety state, unspecified 2005     Major depression in complete remission (H) 2012     Papanicolaou smear of cervix with low grade squamous intraepithelial lesion (LGSIL) 2005    HPV 16 - high risk - colp negative     Unspecified asthma(493.90)      Unspecified hypothyroidism 2005      Past Surgical History:   Procedure Laterality Date     CARDIAC STRESS TST,COMPLETE  2009    Normal      SECTION  2012    Procedure:  SECTION;   SECTION;  Surgeon: Warner Ac MD;  [facility]      SECTION, TUBAL LIGATION, COMBINED N/A 2016    Procedure: COMBINED  SECTION, TUBAL LIGATION;  Surgeon: Warner Ac MD;  [facility]     GYN SURGERY  2012    C section     LAPAROSCOPIC CHOLECYSTECTOMY N/A 10/06/2020    Procedure: laparoscopic cholecystectomy and removal of abdominal  "wall mass;  Surgeon: Dipak Cornelius MD;  Location: [facility]     SURGICAL HISTORY OF -   01/01/2004    root canal       Review of Systems   Constitutional: Negative for chills and fever.   HENT: Negative for congestion, ear pain, hearing loss and sore throat.    Eyes: Negative for pain and visual disturbance.   Respiratory: Negative for cough and shortness of breath.    Cardiovascular: Negative for chest pain, palpitations and peripheral edema.   Gastrointestinal: Negative for abdominal pain, constipation, diarrhea, heartburn, hematochezia and nausea.   Breasts:  Negative for tenderness, breast mass and discharge.   Genitourinary: Negative for dysuria, frequency, genital sores, hematuria, pelvic pain, urgency, vaginal bleeding and vaginal discharge.   Musculoskeletal: Positive for arthralgias. Negative for joint swelling and myalgias.   Skin: Negative for rash.   Neurological: Negative for dizziness, weakness, headaches and paresthesias.   Psychiatric/Behavioral: Negative for mood changes. The patient is not nervous/anxious.         OBJECTIVE:   /77   Pulse 97   Temp 98.7  F (37.1  C) (Tympanic)   Resp 15   Ht 1.642 m (5' 4.65\")   Wt 87.1 kg (192 lb 1.6 oz)   SpO2 99%   BMI 32.32 kg/m    Physical Exam  GENERAL: healthy, alert and no distress  EYES: Eyes grossly normal to inspection, PERRL and conjunctivae and sclerae normal  HENT: ear canals and TM's normal, nose and mouth without ulcers or lesions  NECK: no adenopathy, no asymmetry, masses, or scars and thyroid normal to palpation  RESP: lungs clear to auscultation - no rales, rhonchi or wheezes  BREAST: normal without masses, tenderness or nipple discharge and no palpable axillary masses or adenopathy  CV: regular rate and rhythm, normal S1 S2, no S3 or S4, no murmur, click or rub, no peripheral edema and peripheral pulses strong  ABDOMEN: soft, nontender, no hepatosplenomegaly, no masses and bowel sounds normal   (female): normal female external " genitalia, normal urethral meatus, vaginal mucosa, normal cervix/adnexa/uterus without masses or discharge. IUD strings visualized.  MS: no gross musculoskeletal defects noted, no edema  POSITIVE bilateral knees no tenderness but crepitus bilateral patellar compression  SKIN: no suspicious lesions or rashes  Nails: no discolration or sign of fungal infection. No pitting. Deep vertical ridges, appear normal  NEURO: Normal strength and tone, mentation intact and speech normal  BACK: no CVA tenderness, no paralumbar tenderness  PSYCH: mentation appears normal, affect normal/bright  LYMPH: no cervical, supraclavicular, axillary, or inguinal adenopathy    Diagnostic Test Results:  Labs reviewed in Epic    ASSESSMENT/PLAN:     ASSESSMENT/PLAN:      ICD-10-CM    1. Routine general medical examination at a health care facility  Z00.00       2. Bilateral anterior knee pain  M25.561 Physical Therapy Referral    M25.562       3. VANESSA (generalized anxiety disorder)  F41.1 venlafaxine (EFFEXOR-ER) 225 MG 24 hr tablet      4. Anxiety  F41.9 busPIRone HCl (BUSPAR) 30 MG tablet      5. Acquired hypothyroidism  E03.9 TSH with free T4 reflex     TSH with free T4 reflex      6. Elevated LFTs  R79.89 CBC with platelets and differential     Ferritin     Hepatic function panel     CBC with platelets and differential     Ferritin     Hepatic panel (Albumin, ALT, AST, Bili, Alk Phos, TP)      7. Obesity (BMI 30-39.9)  E66.9 Adult Comprehensive Weight Management  Referral      8. Fatty liver  K76.0       9. Cervical cancer screening  Z12.4 PAP screen with HPV - recommended age 30 - 65 years     HPV Hold (Lab Only)        Abnormal LFTs: has had lab and ultrasound workup, fat infiltrates. Alcohol use maybe once a month. Possible lab abnormalities due to NALFD. Per patient request more information provided on this. Will check again today, consider hepatology consult.    Hypothyroidism: check labs today    Obesity: recommended weight  "management referral    VANESSA: continue effexor. Check creatinine.    Seeing dermatology this summer.    Patient Instructions   Dr. Weber and Giovanna Escoto for medical weight management, referral    Knees: physical therapy. Mini squats, mini lunges, straight leg raise    The scan shows some fat infiltrates on the liver This is a common finding on scans: weight loss, Mediterranean style diet (high in healthy fats, low in unhealthy fats) can help this and prevent this from getting worse over time. This can cause mild increases in liver function tests.      COUNSELING:  Reviewed preventive health counseling, as reflected in patient instructions       Regular exercise       Healthy diet/nutrition       Immunizations    Declined: Covid-19, Hepatitis B and Influenza       Contraception      BMI:   Estimated body mass index is 32.32 kg/m  as calculated from the following:    Height as of this encounter: 1.642 m (5' 4.65\").    Weight as of this encounter: 87.1 kg (192 lb 1.6 oz).   Weight management plan: Patient referred to endocrine and/or weight management specialty Discussed healthy diet and exercise guidelines      She reports that she has never smoked. She has never used smokeless tobacco.      Gwen Zambrano PA-C  [facility]  "

## 2023-05-16 PROBLEM — K76.0 FATTY LIVER: Status: ACTIVE | Noted: 2023-05-16

## 2023-05-16 PROBLEM — E66.9 OBESITY (BMI 30-39.9): Status: ACTIVE | Noted: 2023-05-16

## 2023-05-16 PROBLEM — R79.89 ELEVATED LFTS: Status: ACTIVE | Noted: 2023-05-16

## 2023-05-17 ENCOUNTER — MYC MEDICAL ADVICE (OUTPATIENT)
Dept: FAMILY MEDICINE | Facility: CLINIC | Age: 43
End: 2023-05-17
Payer: COMMERCIAL

## 2023-05-17 LAB
BKR LAB AP GYN ADEQUACY: NORMAL
BKR LAB AP GYN INTERPRETATION: NORMAL
BKR LAB AP HPV REFLEX: NORMAL
BKR LAB AP PREVIOUS ABNORMAL: NORMAL
PATH REPORT.COMMENTS IMP SPEC: NORMAL
PATH REPORT.COMMENTS IMP SPEC: NORMAL
PATH REPORT.RELEVANT HX SPEC: NORMAL

## 2023-05-18 LAB
HUMAN PAPILLOMA VIRUS 16 DNA: NEGATIVE
HUMAN PAPILLOMA VIRUS 18 DNA: NEGATIVE
HUMAN PAPILLOMA VIRUS FINAL DIAGNOSIS: NORMAL
HUMAN PAPILLOMA VIRUS OTHER HR: NEGATIVE

## 2023-08-17 ENCOUNTER — OFFICE VISIT (OUTPATIENT)
Dept: DERMATOLOGY | Facility: CLINIC | Age: 43
End: 2023-08-17
Payer: COMMERCIAL

## 2023-08-17 ENCOUNTER — TELEPHONE (OUTPATIENT)
Dept: DERMATOLOGY | Facility: CLINIC | Age: 43
End: 2023-08-17

## 2023-08-17 DIAGNOSIS — L82.1 SEBORRHEIC KERATOSIS: ICD-10-CM

## 2023-08-17 DIAGNOSIS — L65.9 HYPOTRICHOSIS: Primary | ICD-10-CM

## 2023-08-17 DIAGNOSIS — L70.0 ACNE VULGARIS: ICD-10-CM

## 2023-08-17 DIAGNOSIS — D22.9 NEVUS: ICD-10-CM

## 2023-08-17 DIAGNOSIS — L71.9 ROSACEA: ICD-10-CM

## 2023-08-17 DIAGNOSIS — L81.4 LENTIGO: ICD-10-CM

## 2023-08-17 DIAGNOSIS — D18.01 ANGIOMA OF SKIN: ICD-10-CM

## 2023-08-17 PROCEDURE — 99214 OFFICE O/P EST MOD 30 MIN: CPT | Performed by: PHYSICIAN ASSISTANT

## 2023-08-17 RX ORDER — TRETINOIN 0.25 MG/G
CREAM TOPICAL
Qty: 45 G | Refills: 11 | Status: SHIPPED | OUTPATIENT
Start: 2023-08-17 | End: 2024-08-01

## 2023-08-17 RX ORDER — BIMATOPROST 3 UG/ML
1 SOLUTION TOPICAL AT BEDTIME
Qty: 5 ML | Refills: 11 | Status: SHIPPED | OUTPATIENT
Start: 2023-08-17 | End: 2024-08-01

## 2023-08-17 RX ORDER — SPIRONOLACTONE 100 MG/1
TABLET, FILM COATED ORAL
Qty: 90 TABLET | Refills: 1 | Status: SHIPPED | OUTPATIENT
Start: 2023-08-17 | End: 2024-02-12

## 2023-08-17 NOTE — TELEPHONE ENCOUNTER
Prior Authorization Retail Medication Request    Medication/Dose: Bimatoprost 0.03% solution  ICD code (if different than what is on RX):    Previously Tried and Failed:    Rationale:      Insurance [facility]  [facility]  Insurance ID:  [id number]      Pharmacy Information (if different than what is on RX)  Name:  [facility]  Phone:  [phone]

## 2023-08-17 NOTE — LETTER
8/17/2023         RE: Shirlene Rodriguez  [address] [place] [address]        Dear Colleague,    Thank you for referring your patient, Shirlene Rodriguez, to the [facility]. Please see a copy of my visit note below.    HPI:   Shirlene Rodriguez is a 43 year old female who presents for Full skin cancer screening and for recheck of acne/rosacea on the face. She stopped spironolactone as she was told it could be contributing to her redness. She is making deeper pimples now. She used mirvaso which worked at first but then stopped. She has failed Rhofade in the past.  She is using tretinoin cream every day.    Last Skin Exam: 1 year ago      1st Baseline: No  Personal HX of Skin Cancer: no   Personal HX of Malignant Melanoma: no   Family HX of Skin Cancer / Malignant Melanoma: no  Personal HX of Atypical Moles:  Yes moderately atypical nevus 2018  Risk factors: regular sun exposure  New / Changing lesions: no  Social History: works at [employer]  On review of systems, there are no further skin complaints, patient is feeling otherwise well.  See patient intake sheet.  ROS of the following were done and are negative: Constitutional, Eyes, Ears, Nose,   Mouth, Throat, Cardiovascular, Respiratory, GI, Genitourinary, Musculoskeletal,   Psychiatric, Endocrine, Allergic/Immunologic.      PHYSICAL EXAM:   There were no vitals taken for this visit.  Skin exam performed as follows: Type 2 skin. Mood appropriate  Alert and Oriented X 3. Well developed, well nourished in no distress.  General appearance: Normal  Head including face: Normal  Eyes: conjunctiva and lids: Normal  Mouth: Lips, teeth, gums: Normal  Neck: Normal  Chest-breast/axillae: Normal  Back: Normal  Spleen and liver: Normal  Cardiovascular: Exam of peripheral vascular system by observation for swelling, varicosities, edema: Normal  Genitalia: groin, buttocks: Normal  Extremities: digits/nails (clubbing): Normal  Eccrine and Apocrine  glands: Normal  Right upper extremity: Normal  Left upper extremity: Normal  Right lower extremity: Normal  Left lower extremity: Normal  Skin: Scalp and body hair: See below    Pt deferred exam of breasts, groin, buttocks: No    Other physical findings:  1. Multiple pigmented macules on extremities and trunk  2. Multiple pigmented macules on face, trunk and extremities  3. Multiple vascular papules on trunk, arms and legs  4. Multiple scattered keratotic plaques  5. Background erythema; telangiectasias on the nose       Except as noted above, no other signs of skin cancer or melanoma.     ASSESSMENT/PLAN:   Benign Full skin cancer screening today. Patient with history of a moderately atypical nevus in 2018  Advised on monthly self exams and 1 year  Patient Education: Appropriate brochures given.    Multiple benign appearing nevi on arms, legs and trunk. Discussed ABCDEs of melanoma and sunscreen.   Multiple lentigos on arms, legs and trunk. Advised benign, no treatment needed.  Multiple scattered angiomas. Advised benign, no treatment needed.   Seborrheic keratosis on arms, legs and trunk. Advised benign, no treatment needed.  Hypotrichosis  --Start Latisse every day   Verruca vulgaris - advised on diagnosis and treatment options.Discussed LN2, Candin, NuCara Wart Peel and OTC treatments. Discussed likely need for multiple treatments.   --Start NuCara wart peel at bedtime as tolerated   Acne Vulgaris   --Restart spironolactone 100 mg daily. Advised on potential for hypotension, teratogenetic effects, menstrual irregularities, hyperkalemia and need for Q 6 month K+ checks.  --Continue tretinoin 0.025% cream QHS  --Start topical oxymetazoline/aloe compound QAM        Follow-up: yearly if doing well/PRN sooner    1.) Patient was asked about new and changing moles. YES  2.) Patient received a complete physical skin examination: YES  3.) Patient was counseled to perform a monthly self skin examination: YES  Scribed By:  Sasha Hughes, MS, PAHueyC      Again, thank you for allowing me to participate in the care of your patient.        Sincerely,        Sasha Hughes PA-C

## 2023-08-17 NOTE — PROGRESS NOTES
HPI:   Shirlene Rodriguez is a 43 year old female who presents for Full skin cancer screening and for recheck of acne/rosacea on the face. She stopped spironolactone as she was told it could be contributing to her redness. She is making deeper pimples now. She used mirvaso which worked at first but then stopped. She has failed Rhofade in the past.  She is using tretinoin cream every day.    Last Skin Exam: 1 year ago      1st Baseline: No  Personal HX of Skin Cancer: no   Personal HX of Malignant Melanoma: no   Family HX of Skin Cancer / Malignant Melanoma: no  Personal HX of Atypical Moles:  Yes moderately atypical nevus 2018  Risk factors: regular sun exposure  New / Changing lesions: no  Social History: works at [employer]  On review of systems, there are no further skin complaints, patient is feeling otherwise well.  See patient intake sheet.  ROS of the following were done and are negative: Constitutional, Eyes, Ears, Nose,   Mouth, Throat, Cardiovascular, Respiratory, GI, Genitourinary, Musculoskeletal,   Psychiatric, Endocrine, Allergic/Immunologic.      PHYSICAL EXAM:   There were no vitals taken for this visit.  Skin exam performed as follows: Type 2 skin. Mood appropriate  Alert and Oriented X 3. Well developed, well nourished in no distress.  General appearance: Normal  Head including face: Normal  Eyes: conjunctiva and lids: Normal  Mouth: Lips, teeth, gums: Normal  Neck: Normal  Chest-breast/axillae: Normal  Back: Normal  Spleen and liver: Normal  Cardiovascular: Exam of peripheral vascular system by observation for swelling, varicosities, edema: Normal  Genitalia: groin, buttocks: Normal  Extremities: digits/nails (clubbing): Normal  Eccrine and Apocrine glands: Normal  Right upper extremity: Normal  Left upper extremity: Normal  Right lower extremity: Normal  Left lower extremity: Normal  Skin: Scalp and body hair: See below    Pt deferred exam of breasts, groin, buttocks: No    Other physical  findings:  1. Multiple pigmented macules on extremities and trunk  2. Multiple pigmented macules on face, trunk and extremities  3. Multiple vascular papules on trunk, arms and legs  4. Multiple scattered keratotic plaques  5. Background erythema; telangiectasias on the nose       Except as noted above, no other signs of skin cancer or melanoma.     ASSESSMENT/PLAN:   Benign Full skin cancer screening today. Patient with history of a moderately atypical nevus in 2018  Advised on monthly self exams and 1 year  Patient Education: Appropriate brochures given.    Multiple benign appearing nevi on arms, legs and trunk. Discussed ABCDEs of melanoma and sunscreen.   Multiple lentigos on arms, legs and trunk. Advised benign, no treatment needed.  Multiple scattered angiomas. Advised benign, no treatment needed.   Seborrheic keratosis on arms, legs and trunk. Advised benign, no treatment needed.  Hypotrichosis  --Start Latisse every day   Verruca vulgaris - advised on diagnosis and treatment options.Discussed LN2, Candin, NuCara Wart Peel and OTC treatments. Discussed likely need for multiple treatments.   --Start NuCara wart peel at bedtime as tolerated   Acne Vulgaris   --Restart spironolactone 100 mg daily. Advised on potential for hypotension, teratogenetic effects, menstrual irregularities, hyperkalemia and need for Q 6 month K+ checks.  --Continue tretinoin 0.025% cream QHS  --Start topical oxymetazoline/aloe compound QAM        Follow-up: yearly if doing well/PRN sooner    1.) Patient was asked about new and changing moles. YES  2.) Patient received a complete physical skin examination: YES  3.) Patient was counseled to perform a monthly self skin examination: YES  Scribed By: Sasha Hughes, MS, PA-C

## 2023-08-21 NOTE — TELEPHONE ENCOUNTER
Central Prior Authorization Team   Phone: [phone]    PA Initiation    Medication: BIMATOPROST 0.03 % OP SOLN  Insurance Company: [employer] - Phone [phone] Fax [phone]  Pharmacy Filling the Rx: [facility] [place] - [address]  Filling Pharmacy Phone: [phone]  Filling Pharmacy Fax:    Start Date: 8/21/2023

## 2023-08-22 NOTE — TELEPHONE ENCOUNTER
PRIOR AUTHORIZATION DENIED    Medication: BIMATOPROST 0.03 % OP SOLN  Insurance Company: [employer] - Phone [phone] Fax [phone]  Denial Date: 8/22/2023  Denial Rational: NOT COVERED FOR DX PROVIDED - ALSO REQUESTED MEDICATION IS AN EXCLUDED AGENT PER PLAN        Appeal Information: IF PROVIDER WOULD LIKE TO APPEAL THIS DECISION PLEASE PROVIDE PA TEAM WITH LETTER OF MEDICAL NECESSITY      Patient Notified: No

## 2023-08-29 DIAGNOSIS — E03.9 ACQUIRED HYPOTHYROIDISM: ICD-10-CM

## 2023-08-29 RX ORDER — LEVOTHYROXINE SODIUM 112 UG/1
112 TABLET ORAL DAILY
Qty: 90 TABLET | Refills: 2 | Status: SHIPPED | OUTPATIENT
Start: 2023-08-29 | End: 2024-05-28

## 2023-09-01 ASSESSMENT — SLEEP AND FATIGUE QUESTIONNAIRES
HOW LIKELY ARE YOU TO NOD OFF OR FALL ASLEEP WHILE SITTING QUIETLY AFTER LUNCH WITHOUT ALCOHOL: SLIGHT CHANCE OF DOZING
HOW LIKELY ARE YOU TO NOD OFF OR FALL ASLEEP IN A CAR, WHILE STOPPED FOR A FEW MINUTES IN TRAFFIC: WOULD NEVER DOZE
HOW LIKELY ARE YOU TO NOD OFF OR FALL ASLEEP WHILE SITTING AND READING: MODERATE CHANCE OF DOZING
HOW LIKELY ARE YOU TO NOD OFF OR FALL ASLEEP WHILE LYING DOWN TO REST IN THE AFTERNOON WHEN CIRCUMSTANCES PERMIT: HIGH CHANCE OF DOZING
HOW LIKELY ARE YOU TO NOD OFF OR FALL ASLEEP WHILE SITTING AND TALKING TO SOMEONE: WOULD NEVER DOZE
HOW LIKELY ARE YOU TO NOD OFF OR FALL ASLEEP WHILE SITTING INACTIVE IN A PUBLIC PLACE: SLIGHT CHANCE OF DOZING
HOW LIKELY ARE YOU TO NOD OFF OR FALL ASLEEP WHILE WATCHING TV: SLIGHT CHANCE OF DOZING
HOW LIKELY ARE YOU TO NOD OFF OR FALL ASLEEP WHEN YOU ARE A PASSENGER IN A CAR FOR AN HOUR WITHOUT A BREAK: SLIGHT CHANCE OF DOZING

## 2023-09-06 ENCOUNTER — VIRTUAL VISIT (OUTPATIENT)
Dept: FAMILY MEDICINE | Facility: CLINIC | Age: 43
End: 2023-09-06
Payer: COMMERCIAL

## 2023-09-06 DIAGNOSIS — Z13.220 SCREENING, LIPID: ICD-10-CM

## 2023-09-06 DIAGNOSIS — Z13.0 SCREENING FOR ENDOCRINE, NUTRITIONAL, METABOLIC AND IMMUNITY DISORDER: ICD-10-CM

## 2023-09-06 DIAGNOSIS — K76.0 NAFLD (NONALCOHOLIC FATTY LIVER DISEASE): ICD-10-CM

## 2023-09-06 DIAGNOSIS — Z13.29 SCREENING FOR ENDOCRINE, NUTRITIONAL, METABOLIC AND IMMUNITY DISORDER: ICD-10-CM

## 2023-09-06 DIAGNOSIS — E03.9 ACQUIRED HYPOTHYROIDISM: ICD-10-CM

## 2023-09-06 DIAGNOSIS — Z13.228 SCREENING FOR METABOLIC DISORDER: ICD-10-CM

## 2023-09-06 DIAGNOSIS — Z13.228 SCREENING FOR ENDOCRINE, NUTRITIONAL, METABOLIC AND IMMUNITY DISORDER: ICD-10-CM

## 2023-09-06 DIAGNOSIS — K80.20 GALLSTONES: ICD-10-CM

## 2023-09-06 DIAGNOSIS — Z13.21 SCREENING FOR ENDOCRINE, NUTRITIONAL, METABOLIC AND IMMUNITY DISORDER: ICD-10-CM

## 2023-09-06 DIAGNOSIS — Z13.1 SCREENING FOR DIABETES MELLITUS: ICD-10-CM

## 2023-09-06 DIAGNOSIS — K21.9 GASTROESOPHAGEAL REFLUX DISEASE WITHOUT ESOPHAGITIS: ICD-10-CM

## 2023-09-06 DIAGNOSIS — E66.811 CLASS 1 OBESITY WITH SERIOUS COMORBIDITY AND BODY MASS INDEX (BMI) OF 32.0 TO 32.9 IN ADULT, UNSPECIFIED OBESITY TYPE: Primary | ICD-10-CM

## 2023-09-06 DIAGNOSIS — E66.9 OBESITY (BMI 30-39.9): ICD-10-CM

## 2023-09-06 PROCEDURE — 99215 OFFICE O/P EST HI 40 MIN: CPT | Mod: VID | Performed by: FAMILY MEDICINE

## 2023-09-06 NOTE — ASSESSMENT & PLAN NOTE
NAFLD - Wt reduction recommended, if gaining weight may become a candidate for weight loss surgery. (If she enters class 2 obesity and has comorbid nafld)

## 2023-09-06 NOTE — ASSESSMENT & PLAN NOTE
Hypothyroidism, tsh normal 5/2023. Continue levothyroxine 112 mcg, recheck if losing significant weight as she is starting physician assisted weight loss today.

## 2023-09-06 NOTE — ASSESSMENT & PLAN NOTE
OBESITY WITH BMI 32 AT INTAKE AND CHRONIC COMORBID WEIGHT RELATED CONDITIONS INCULDING: NAFLD, HYPOTHYROIDISM, AND NOTABLE ANXIETY (takes atenolol prn, buspar, and effexor)    PATIENT IS BEGINNING ACTIVE MEDICALLY SUPERVISED WEIGHT LOSS STARTING AT There is no height or weight on file to calculate BMI. - planning to utilize low calorie diet, physical activity and  medications to facilitate this loss    Contraception - mirena and tubal ligation - she understands she cannot become pregnant on weight loss medications as they have generally, not been tested in pregnancy.     Patient accepted 24 week weight loss program (she is an employee so gets a discount)  We did discuss food supplements.     Medications started today:  wegovy, (if cost prohibitive she will also check cost of saxenda, ozempic and victoza) but if no glp 1 agonist is covered would try contrave but need to be cautious of her anxiety.     Current goal(s): wegovy.     Lab assessment plan: labs reviewed and additional are ordered, she will make fasting lab appt.     Currently not a candidate for surgery, but  if gaining weight may become a candidate for weight loss surgery. (If she enters class 2 obesity and has comorbid nafld)    Follow up 1-3 months.     DISCUSSION _________________________________________________________________    Dx:  Initial bmi 32.  With the following weight related comorbid medical conditions: nafld, hypothyroid, significant anxiety (takes prn atenolol, buspar and daily effexor)    BECAUSE OF ABOVE PROBLEMS IT IS STRONGLY ADVISED THAT Shirlene LOSE WEIGHT.  BELOW IS MY PLAN FOR her     [place], Gwen is her primary care provider - who referred her here today for help with weight loss.    Start weight 192 lbs, There is no height or weight on file to calculate BMI.  9/6/2023     Medication notes:     Saxenda - could consider  Wegovy - could consider.   Metformin - could consider.   Wellbutrin - could consider.   Naltrexone - could  consider.   Contrave - could consider.   Phentermine/ diethylproprion - contraindicated due to anxiety  Topamax - could consider.   Qsymia - contraindicated due to anxiety   Orlistat - could consider.   Plenity - could consider.

## 2023-09-06 NOTE — PATIENT INSTRUCTIONS
"Wegovy Pen Teaching Video:   https://www.[employer].com/taking-wegovy/how-to-use-the-wegovy-pen.html  -Scroll down video labeled \"How to Use the Wegovy Pen\"     Wegovy start: Wegovy dosing is as shown below.  After the first 2 weeks of each \"step\" let me know how you are doing and I will send in the next step.  This requires a new prescription.  This is an opportunity to introduce error (clinic, pharmacy etc.).  We will (obviously) to our  best to not make an error.  Please ensure that the pharmacy dispenses the correct dose.  - 0.25 mg/week x 4 weeks  - 0.5 mg/week x 4 weeks  - 1 mg/week x 4 weeks  - 1.7 mg/week x 4 weeks  - 2.4 mg/week -ongoing    With each dose adjustment you can expect to feel some nausea.  This can last from hours to days.  Nausea is the most common reason for a person to be intolerant to these medications.    When an individual is on a GLP-1 receptor agonist such as Wegovy, I generally see the patient back approximately 10 weeks after starting the new medication.  The agenda for this visit is to review the benefit of the medication (as well as side effects), the nutrition plan and any other considerations.  This can be done via video or face-to-face per the individual's preference.  We also need to see the patient back 5-6 months after starting the new medication.  This visit should be done face-to-face and is used to confirm ongoing insurance coverage for the medication.    Alternatives:   Wegovy  Saxenda  Ozempic  Victoza  Contrave       "

## 2023-09-06 NOTE — PROGRESS NOTES
New Medical Weight Management Consult    PATIENT:  Shirlene Rodriguez  MRN:         [id number]  :         1980  FELI:         2023    Dear Gwen Zambrano PA-C,    I had the pleasure of seeing your patient, Shirlene Rodriguez. Full intake/assessment was done to determine barriers to weight loss success and develop a treatment plan. Shirlene Rodriguez is a 43 year old female interested in treatment of medical problems associated with excess weight. Bmi 32.    ASSESSMENT/PLAN:  Abigail Tinoco MD, Family Medicine and Diplomate of the American Board of Obesity Medicine 2023     ASSESSMENT AND PLAN:   I spent 62 minutes today in direct patient contact, 100% of the time in consultation concerning medical problems as listed below.     ASSESSMENT/ PLANr.     Problem List Items Addressed This Visit          Digestive    NAFLD (nonalcoholic fatty liver disease)     NAFLD - Wt reduction recommended, if gaining weight may become a candidate for weight loss surgery. (If she enters class 2 obesity and has comorbid nafld)         Class 1 obesity with serious comorbidity and body mass index (BMI) of 32.0 to 32.9 in adult - Primary     OBESITY WITH BMI 32 AT INTAKE AND CHRONIC COMORBID WEIGHT RELATED CONDITIONS INCULDING: NAFLD, HYPOTHYROIDISM, AND NOTABLE ANXIETY (takes atenolol prn, buspar, and effexor)    PATIENT IS BEGINNING ACTIVE MEDICALLY SUPERVISED WEIGHT LOSS STARTING AT There is no height or weight on file to calculate BMI. - planning to utilize low calorie diet, physical activity and  medications to facilitate this loss    Contraception - mirena and tubal ligation - she understands she cannot become pregnant on weight loss medications as they have generally, not been tested in pregnancy.     Patient accepted 24 week weight loss program (she is an employee so gets a discount)  We did discuss food supplements.     Medications started today:  wegovy, (if cost prohibitive she will also check cost of saxenda,  ozempic and victoza) but if no glp 1 agonist is covered would try contrave but need to be cautious of her anxiety.     Current goal(s): wegovy.     Lab assessment plan: labs reviewed and additional are ordered, she will make fasting lab appt.     Currently not a candidate for surgery, but  if gaining weight may become a candidate for weight loss surgery. (If she enters class 2 obesity and has comorbid nafld)    Follow up 1-3 months.     DISCUSSION _________________________________________________________________    Dx:  Initial bmi 32.  With the following weight related comorbid medical conditions: nafld, hypothyroid, significant anxiety (takes prn atenolol, buspar and daily effexor)    BECAUSE OF ABOVE PROBLEMS IT IS STRONGLY ADVISED THAT Shirlene LOSE WEIGHT.  BELOW IS MY PLAN FOR her     Artkratz, Gwen is her primary care provider - who referred her here today for help with weight loss.    Start weight 192 lbs, There is no height or weight on file to calculate BMI.  9/6/2023     Medication notes:     Saxenda - could consider  Wegovy - could consider.   Metformin - could consider.   Wellbutrin - could consider.   Naltrexone - could consider.   Contrave - could consider.   Phentermine/ diethylproprion - contraindicated due to anxiety  Topamax - could consider.   Qsymia - contraindicated due to anxiety   Orlistat - could consider.   Plenity - could consider.          Relevant Medications    Semaglutide-Weight Management (WEGOVY) 0.25 MG/0.5ML pen    Other Relevant Orders    Health  Referral    RESOLVED: GERD (gastroesophageal reflux disease)     Resolved since getting gallbladder removed.          RESOLVED: Gallstones     Resolved with cholecystectomy            Endocrine    Acquired hypothyroidism     Hypothyroidism, tsh normal 5/2023. Continue levothyroxine 112 mcg, recheck if losing significant weight as she is starting physician assisted weight loss today.           Other Visit Diagnoses       Screening  "for endocrine, nutritional, metabolic and immunity disorder        Relevant Orders    Vitamin D Deficiency    Screening, lipid        Relevant Orders    Lipid Profile    Screening for diabetes mellitus        Relevant Orders    Hemoglobin A1c    Screening for metabolic disorder        Relevant Orders    Basic metabolic panel  (Ca, Cl, CO2, Creat, Gluc, K, Na, BUN)              She has the following co-morbidities:        9/1/2023     3:24 PM   --   I have the following health issues associated with obesity Fatty Liver    Hypothyroidism   I have the following symptoms associated with obesity Knee Pain    Back Pain    Fatigue            No data to display                    9/1/2023     3:24 PM   Referring Provider   Please name the provider who referred you to Medical Weight Management  If you do not know, please answer \"I Don't Know\" Gwen Cordovamendoza           9/1/2023     3:24 PM   Weight History   How concerned are you about your weight? Very Concerned   I became overweight As an Adult   The following factors have contributed to my weight gain Eating Too Much    Lack of Exercise    Stress   I have tried the following methods to lose weight Watching Portions or Calories    Exercise    Atkins-type Diet (Low Carb/High Protein)    Meal Replacements    Fasting   My lowest weight since age 19 was 90   My highest weight since age 19 was 200   The most weight I have ever lost was (lbs) 5   I have the following family history of obesity/being overweight One or more of my siblings are overweight    Many of my relatives are overweight   How has your weight changed over the last year? Gained   How many pounds? 15-20           9/1/2023     3:24 PM   Diet Recall Review with Patient   If you do eat supper, what types of food do you typically eat? Typically pasta or carb of some sort, meat, cheese and veggies. Have a side of fruit when possible   If you do snack, what types of food do you typically eat? Chips, crackers, fruit, " veggies, cheese   How many glasses of juice do you drink in a typical day? 0   How many of glasses of milk do you drink in a typical day? 0   How many 8oz glasses of sugar containing drinks such as Alberto-Aid/sweet tea do you drink in a day? 0   How many cans/bottles of sugar pop/soda/tea/sports drinks do you drink in a day? 0   How many cans/bottles of diet pop/soda/tea or sports drink do you drink in a day? 0   How often do you have a drink of alcohol? 2-4 Times a Month   If you do drink, how many drinks might you have in a day? 1 or 2           9/1/2023     3:24 PM   Eating Habits   Generally, my meals include foods like these bread, pasta, rice, potatoes, corn, crackers, sweet dessert, pop, or juice Almost Everyday   Generally, my meals include foods like these fried meats, brats, burgers, french fries, pizza, cheese, chips, or ice cream A Few Times a Week   Eat fast food (like McDonalds, Burger Calvin, Taco Bell) Less Than Weekly   Eat at a buffet or sit-down restaurant Less Than Weekly   Eat most of my meals in front of the TV or computer Less Than Weekly   Often skip meals, eat at random times, have no regular eating times Almost Everyday   Rarely sit down for a meal but snack or graze throughout Almost Everyday   Eat extra snacks between meals Almost Everyday   Eat most of my food at the end of the day Never   Eat in the middle of the night or wake up at night to eat Never   Eat extra snacks to prevent or correct low blood sugar A Few Times a Week   Eat to prevent acid reflux or stomach pain Never   Worry about not having enough food to eat Never   I eat when I am depressed Never   I eat when I am stressed Almost Everyday   I eat when I am bored A Few Times a Week   I eat when I am anxious Almost Everyday   I eat when I am happy or as a reward Almost Everyday   I feel hungry all the time even if I just have eaten Almost Everyday   Feeling full is important to me A Few Times a Week   I finish all the food on my  plate even if I am already full A Few Times a Week   I can't resist eating delicious food or walk past the good food/smell Almost Everyday   I eat/snack without noticing that I am eating A Few Times a Week   I eat when I am preparing the meal Never   I eat more than usual when I see others eating Never   I have trouble not eating sweets, ice cream, cookies, or chips if they are around the house Everyday   I think about food all day Everyday   What foods, if any, do you crave? Sweets/Candy/Chocolate           9/1/2023     3:24 PM   Amount of Food   I feel out of control when eating Never   I eat a large amount of food, like a loaf of bread, a box of cookies, a pint/quart of ice cream, all at once Never   I eat a large amount of food even when I am not hungry Weekly   I eat rapidly Almost Everyday   I eat alone because I feel embarrassed and do not want others to see how much I have eaten Weekly   I eat until I am uncomfortably full Everyday   I feel bad, disgusted, or guilty after I overeat Almost Everyday           9/1/2023     3:24 PM   Activity/Exercise History   How much of a typical 12 hour day do you spend sitting? Most of the Day   How much of a typical 12 hour day do you spend lying down? Less Than Half the Day   How much of a typical day do you spend walking/standing? Less Than Half the Day   How many hours (not including work) do you spend on the TV/Video Games/Computer/Tablet/Phone? 2-3 Hours   How many times a week are you active for the purpose of exercise? 2-3 Times a Week   What keeps you from being more active? Shortness of Breath    Too tired    Worried People Will Look At Me   How many total minutes do you spend doing some activity for the purpose of exercising when you exercise? 15-30 Minutes       PAST MEDICAL HISTORY:  Past Medical History:   Diagnosis Date    Abnormal Pap smear of cervix 11/09/2011    Acne     Allergies     Anxiety state, unspecified 01/20/2005    ASCUS with positive high risk  HPV cervical 2005    HPV 16 - high risk - colp negative    Depressive disorder 2012    Gallstones 2020    Added automatically from request for surgery [id number]    GERD (gastroesophageal reflux disease) 2009    History of  2012    Major depression in complete remission (H) 2012    Uncomplicated asthma     Unspecified asthma(493.90)     Unspecified hypothyroidism 2005     3:24 PM   Work/Social History Reviewed With Patient   My employment status is Full-Time   My job is [employer]   How much of your job is spent on the computer or phone? 100%   How many hours do you spend commuting to work daily? 0   What is your marital status? /In a Relationship   If in a relationship, is your significant other overweight? Yes   If you have children, are they overweight? No   Who do you live with? My  and 2 children   Who does the food shopping? I do mostly, my  does every so often           2023     3:24 PM   Mental Health History Reviewed With Patient   Have you ever been physically or sexually abused? No   How often in the past 2 weeks have you felt little interest or pleasure in doing things? Nearly Everyday   Over the past 2 weeks how often have you felt down, depressed, or hopeless? Not at all           2023     3:24 PM   Sleep History Reviewed With Patient   How many hours do you sleep at night? 8       MEDICATIONS:   Current Outpatient Medications   Medication Sig Dispense Refill    atenolol (TENORMIN) 25 MG tablet TAKE ONE TABLET BY MOUTH ONCE DAILY AS NEEDED FOR ANXIETY 30 tablet 0    bimatoprost (LATISSE) 0.03 % external opthalmic solution Apply 1 drop topically At Bedtime 5 mL 11    busPIRone HCl (BUSPAR) 30 MG tablet Take 1 tablet (30 mg) by mouth daily 90 tablet 3    COMPOUNDED NON-CONTROLLED SUBSTANCE (CMPD RX) - PHARMACY TO MIX COMPOUNDED MEDICATION Oxymetazoline 1%/aloe topical sig apply to face QAM 30 g 11     levonorgestrel (MIRENA) 20 MCG/24HR IUD 1 each (20 mcg) by Intrauterine route once      levothyroxine (SYNTHROID/LEVOTHROID) 112 MCG tablet TAKE ONE TABLET BY MOUTH ONCE DAILY 90 tablet 2    Semaglutide-Weight Management (WEGOVY) 0.25 MG/0.5ML pen Inject 0.25 mg Subcutaneous once a week 2 mL 0    SF 5000 PLUS 1.1 % CREA       spironolactone (ALDACTONE) 100 MG tablet 1 tab po daily 90 tablet 1    tretinoin (RETIN-A) 0.025 % external cream Apply pea size amount to entire face QD 45 g 11    venlafaxine (EFFEXOR-ER) 225 MG 24 hr tablet Take 1 tablet (225 mg) by mouth daily 90 tablet 3       ALLERGIES:   Allergies   Allergen Reactions    No Known Allergies        PHYSICAL EXAM:  Physical Exam  Constitutional:       Appearance: Normal appearance.   HENT:      Head: Normocephalic and atraumatic.   Pulmonary:      Effort: Pulmonary effort is normal.   Musculoskeletal:         General: Normal range of motion.      Cervical back: Normal range of motion and neck supple.   Neurological:      General: No focal deficit present.      Mental Status: She is alert and oriented to person, place, and time.           Sincerely,    Abigail Tinoco MD

## 2023-09-07 ENCOUNTER — LAB (OUTPATIENT)
Dept: LAB | Facility: CLINIC | Age: 43
End: 2023-09-07
Attending: FAMILY MEDICINE
Payer: COMMERCIAL

## 2023-09-07 DIAGNOSIS — Z13.29 SCREENING FOR ENDOCRINE, NUTRITIONAL, METABOLIC AND IMMUNITY DISORDER: ICD-10-CM

## 2023-09-07 DIAGNOSIS — Z13.220 SCREENING, LIPID: ICD-10-CM

## 2023-09-07 DIAGNOSIS — Z13.1 SCREENING FOR DIABETES MELLITUS: ICD-10-CM

## 2023-09-07 DIAGNOSIS — Z13.0 SCREENING FOR ENDOCRINE, NUTRITIONAL, METABOLIC AND IMMUNITY DISORDER: ICD-10-CM

## 2023-09-07 DIAGNOSIS — Z13.228 SCREENING FOR METABOLIC DISORDER: ICD-10-CM

## 2023-09-07 DIAGNOSIS — R79.89 ELEVATED LFTS: ICD-10-CM

## 2023-09-07 DIAGNOSIS — Z13.21 SCREENING FOR ENDOCRINE, NUTRITIONAL, METABOLIC AND IMMUNITY DISORDER: ICD-10-CM

## 2023-09-07 DIAGNOSIS — Z13.228 SCREENING FOR ENDOCRINE, NUTRITIONAL, METABOLIC AND IMMUNITY DISORDER: ICD-10-CM

## 2023-09-07 LAB
ALBUMIN SERPL BCG-MCNC: 5 G/DL (ref 3.5–5.2)
ALP SERPL-CCNC: 108 U/L (ref 35–104)
ALT SERPL W P-5'-P-CCNC: 51 U/L (ref 0–50)
ANION GAP SERPL CALCULATED.3IONS-SCNC: 14 MMOL/L (ref 7–15)
AST SERPL W P-5'-P-CCNC: 33 U/L (ref 0–45)
BILIRUB DIRECT SERPL-MCNC: <0.2 MG/DL (ref 0–0.3)
BILIRUB SERPL-MCNC: 0.5 MG/DL
BUN SERPL-MCNC: 10.4 MG/DL (ref 6–20)
CALCIUM SERPL-MCNC: 10 MG/DL (ref 8.6–10)
CHLORIDE SERPL-SCNC: 98 MMOL/L (ref 98–107)
CHOLEST SERPL-MCNC: 199 MG/DL
CREAT SERPL-MCNC: 0.75 MG/DL (ref 0.51–0.95)
DEPRECATED HCO3 PLAS-SCNC: 25 MMOL/L (ref 22–29)
EGFRCR SERPLBLD CKD-EPI 2021: >90 ML/MIN/1.73M2
GLUCOSE SERPL-MCNC: 86 MG/DL (ref 70–99)
HBA1C MFR BLD: 5.3 % (ref 0–5.6)
HDLC SERPL-MCNC: 83 MG/DL
LDLC SERPL CALC-MCNC: 103 MG/DL
NONHDLC SERPL-MCNC: 116 MG/DL
POTASSIUM SERPL-SCNC: 4.4 MMOL/L (ref 3.4–5.3)
PROT SERPL-MCNC: 7.9 G/DL (ref 6.4–8.3)
SODIUM SERPL-SCNC: 137 MMOL/L (ref 136–145)
TRIGL SERPL-MCNC: 66 MG/DL

## 2023-09-07 PROCEDURE — 36415 COLL VENOUS BLD VENIPUNCTURE: CPT

## 2023-09-07 PROCEDURE — 80053 COMPREHEN METABOLIC PANEL: CPT

## 2023-09-07 PROCEDURE — 80061 LIPID PANEL: CPT

## 2023-09-07 PROCEDURE — 82248 BILIRUBIN DIRECT: CPT

## 2023-09-07 PROCEDURE — 83036 HEMOGLOBIN GLYCOSYLATED A1C: CPT

## 2023-09-07 PROCEDURE — 82306 VITAMIN D 25 HYDROXY: CPT

## 2023-09-08 DIAGNOSIS — K76.0 NAFLD (NONALCOHOLIC FATTY LIVER DISEASE): Primary | ICD-10-CM

## 2023-09-08 LAB — DEPRECATED CALCIDIOL+CALCIFEROL SERPL-MC: 34 UG/L (ref 20–75)

## 2023-09-19 ENCOUNTER — VIRTUAL VISIT (OUTPATIENT)
Dept: SURGERY | Facility: CLINIC | Age: 43
End: 2023-09-19

## 2023-09-19 DIAGNOSIS — E66.9 OBESITY: Primary | ICD-10-CM

## 2023-09-19 PROCEDURE — 99207 PR MEDICAL WEIGHT MANAGEMENT PROGRAM EMPLOYEE ENROLLMENT: CPT

## 2023-09-19 NOTE — LETTER
2023         RE: Shirlene Rodriguez  [address] [place] [address]        Dear Colleague,    Thank you for referring your patient, Shirlene Rodriguez, to the [facility] [place]. Please see a copy of my visit note below.    Reason for Visit: 24-Week Healthy Lifestyle Plan Initial Visit - Health Coaching Virtual Visit    Progress Notes:  New 24-Week Healthy Lifestyle Plan Weight Management Health Coaching Note - Virtual Visit    Shirlene Rodriguez   MRN: [id number]  : 1980  FELI: 2023      Initial Health  Visit #1  Provider: Marti Gallagher [facility]  Location: off-site/home office  Start time: 2:00 pm  End time: 2:30 pm      Assessment:  Initial Start Date: 2023  Graduation Date: 2024  SKURA (online resource) enrollment date(s): 2023  Physician:  Dr. Tinoco  Referred by: self  Initial Weight (lbs): 192 lbs.  Current Weight (lbs): 192 lbs.  Average Daily Water (ounces): - oz/day  Weight Loss Medication: Wegovy  Nutrition Plan: real whole foods       PATIENT INFORMATION PROVIDED via email sent by Health :  1.) 24 Week Healthy Lifestyle Plan Overview:  Explained the structure of the 24-week plan, reviewed scheduling information including the main scheduling phone number [phone], discussed all coaching sessions will be done via phone.  2.) SKURA - Online resource available to patient on the 1st day of the month following start date with Health .  Patient will receive a Welcome email from SKURA with their user name and password.  Patient will have full access to SKURA for a duration of 7 months.  This online resource will be continued if patient purchases the 24-week Extension which includes 3, 30-min. Health and Wellness Coaching appointments.  3.) Support Group monthly topics, dates/times - Flyer with more information provided by the Health  (see end of note for the current list)  4.)  Explain the role of a Health  & the FOUR Pillars of Health (Nutrition, Exercise/Activity/Movement, Sleep and Stress Management)      WELLNESS VISION: 5 minute Visioning Exercise (may be completed at visit #2/#3)    You may choose to close your eyes for this exercise. Start with three full breaths to bring your attention inward.    In your mind s eye, see yourself in the near future. You are your healthiest, happiest, and most true version of yourself. What do you see? What do you notice?     Invite patient to expand on this vision, and/or start  trying on  this vision this week.    Will be meeting with FREDDIE Dickson next week and will create goals around nutrition.  This week: consider what self-care practices are current? What works, what helps to support?      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  2 children: daughter 11, son 7  Lives in [place], grew up in [place] (4th out of 5 siblings), both parents have passed  1 cat and 1 dog (just turned 1 yrs)  Favorite season: summer and fall. Doesn't enjoy winter  Own a camper and usually do a summer trip out west ([place] or [place] for example)      Follow-Up appointments:   9/26 with FREDDIE Dickson   9/27 with CINDY Kidd #2 assessment  11/1 with Dr. Tinoco        Reminder, this is included in your 24-Week Healthy Lifestyle Plan:  Monthly Healthy Lifestyle Support Groups (with Meri Haro) offered virtually via ZOOM.  Contact the Scheduling Center at [phone] & request to be added to the specific date and they will add a ZOOM video appointment to your schedule.  Upcoming:  (Fridays, 12:30pm to 1:30pm):    October 6: Let's Talk ~ Open Discussion to Share Wins and Challenges  November 3: Introduction to Mindfulness  December 1: Let's Talk ~ Open Discussion to Share Wins and Challenges       SIGNATURE:  FAMILIA Zamora, [facility]  Board-Certified Health &   24 Week Healthy Lifestyle Program Health   [facility]  email:  [email or web address]  appointment [phone]      Again, thank you for allowing me to participate in the care of your patient.        Sincerely,        Marti Gallagher

## 2023-09-26 ENCOUNTER — VIRTUAL VISIT (OUTPATIENT)
Dept: SURGERY | Facility: CLINIC | Age: 43
End: 2023-09-26
Payer: COMMERCIAL

## 2023-09-26 DIAGNOSIS — E66.9 OBESITY (BMI 30-39.9): Primary | ICD-10-CM

## 2023-09-26 PROCEDURE — 97802 MEDICAL NUTRITION INDIV IN: CPT | Mod: VID

## 2023-09-26 NOTE — LETTER
9/26/2023         RE: Shirlene Rodriguez  [address] [place] [place] [address]        Dear Colleague,    Thank you for referring your patient, Shirlene Rodriguez, to the [facility] [place]. Please see a copy of my visit note below.    Shirlene Rodriguez is a 43 year old who is being evaluated via a billable video visit.        How would you like to obtain your AVS? MyChart  If the video visit is dropped, the invitation should be resent by: Text to cell phone: [phone]  Will anyone else be joining your video visit? No          Medical Weight Loss Initial Diet Evaluation; 24 week program  RD visit 1/6  Assessment:  This patient was referred by Dr. Tinoco  for MNT as treatment for Obesity.  Shirlene is presenting today for a new weight management nutrition consultation. Pt has had an initial appointment with Dr. Tinoco .    Weight loss medication: Semaglutide. Wegovy    Personal Goals: Overall weight loss and nutrition education.      Anthropometrics:    Pt's weight is 192 lbs  Initial weight: 192 lbs  Weight change: n/a  BMI: There is no height or weight on file to calculate BMI.   Ideal body weight: 56.2 kg (123 lb 13.9 oz)  Adjusted ideal body weight: 68.6 kg (151 lb 2.6 oz)  Estimated RMR (Kayenta-St Jeor equation):  1530 kcals x 1.2 (sedentary) = 1830 kcals (for weight maintenance)      Recommended Protein Intake: 60-80 grams of protein/day    Medical History:  Patient Active Problem List   Diagnosis     VANESSA (generalized anxiety disorder)     ASCUS with positive high risk HPV cervical     Acne     Acquired hypothyroidism     mirena IUD 8/25/2020     NAFLD (nonalcoholic fatty liver disease)     Class 1 obesity with serious comorbidity and body mass index (BMI) of 32.0 to 32.9 in adult      Diabetes: none  HbA1c:  No results found for: HGBA1C    Nutrition History:   Food allergies/intolerances/cultural or religous food customs: No     Weight loss history: Patient  reports since she turned 41 weight gain has been more prominent with difficulty losing weight. Noted her eating habits have not changed and exercise habits have not changed.     Vitamins/Mineral Supplementation: none    Dietary Recall:  Breakfast: skips  Lunch: turkey and cheese sandwich (white) with fruit and vegetable Or frozen meal   Dinner: Tacos, sloppy david's, burgers with a side of vegetables (vegetable medley or salad)  Typical Snacks: 1-2pm chips or crackers, protein bars   Overnight eating: No  Eating out: rarely     Beverages: coffee with cream, water (60oz), soda (rarely)    Exercise: walks in the evening with her dog    Nutrition Diagnosis (PES statement):     Obesity related to nutritional knowledge defect as evidence by patient subjective reports and BMI of 32.32     Nutrition Intervention  Food and/or Nutrient Delivery   Placed emphasis on importance of developing a healthy meal routine, aiming for 3 meals a day and no snacks.  Discussed using a protein supplement as a meal replacement.  Nutrition Education   Discussed with patient how to build a meal: the importance of including a lean/low fat protein at each meal, include a source of vegetables at a minimum of lunch and dinner and limiting carbohydrate intake  Educated on sources of lean protein, portion sizes, the amount of grams found in each source. Recommend patient to aim for 20-30g protein at each meal.    Goals established by patient:   stick to 3 meals per day aiming to be 4-6 hours apart  Aim for each to reach 25g protein to each meal  Stick to wheat over white bread    Handouts provided:  Keys to success  Recipe resources.    Assessment/Plan:    Follow up in 1 month with RD       Video-Visit Details    Type of service:  Video Visit    Video Start Time (time video started): 2:00 PM    Video End Time (time video stopped): 2:26 PM    Originating Location (pt. Location): Home      Distant Location (provider location):  Off-site    Mode of  Communication:  Video Conference via [facility]    Physician has received verbal consent for a Video Visit from the patient? Yes      Jasmina Mckeon RD        Again, thank you for allowing me to participate in the care of your patient.        Sincerely,        Jasmina Mckeon RD

## 2023-09-26 NOTE — PROGRESS NOTES
Shirlene Rodriguez is a 43 year old who is being evaluated via a billable video visit.        How would you like to obtain your AVS? MyChart  If the video visit is dropped, the invitation should be resent by: Text to cell phone: [phone]  Will anyone else be joining your video visit? No          Medical Weight Loss Initial Diet Evaluation; 24 week program  RD visit 1/6  Assessment:  This patient was referred by Dr. Tinoco  for MNT as treatment for Obesity.  Shirlene is presenting today for a new weight management nutrition consultation. Pt has had an initial appointment with Dr. Tinoco .    Weight loss medication: Semaglutide. Wegovy    Personal Goals: Overall weight loss and nutrition education.      Anthropometrics:    Pt's weight is 192 lbs  Initial weight: 192 lbs  Weight change: n/a  BMI: There is no height or weight on file to calculate BMI.   Ideal body weight: 56.2 kg (123 lb 13.9 oz)  Adjusted ideal body weight: 68.6 kg (151 lb 2.6 oz)  Estimated RMR ([place]-St Jeor equation):  1530 kcals x 1.2 (sedentary) = 1830 kcals (for weight maintenance)      Recommended Protein Intake: 60-80 grams of protein/day    Medical History:  Patient Active Problem List   Diagnosis    VANESSA (generalized anxiety disorder)    ASCUS with positive high risk HPV cervical    Acne    Acquired hypothyroidism    mirena IUD 8/25/2020    NAFLD (nonalcoholic fatty liver disease)    Class 1 obesity with serious comorbidity and body mass index (BMI) of 32.0 to 32.9 in adult      Diabetes: none  HbA1c:  No results found for: HGBA1C    Nutrition History:   Food allergies/intolerances/cultural or religous food customs: No     Weight loss history: Patient reports since she turned 41 weight gain has been more prominent with difficulty losing weight. Noted her eating habits have not changed and exercise habits have not changed.     Vitamins/Mineral Supplementation: none    Dietary Recall:  Breakfast: skips  Lunch: turkey and cheese sandwich  (white) with fruit and vegetable Or frozen meal   Dinner: Tacos, sloppy david's, burgers with a side of vegetables (vegetable medley or salad)  Typical Snacks: 1-2pm chips or crackers, protein bars   Overnight eating: No  Eating out: rarely     Beverages: coffee with cream, water (60oz), soda (rarely)    Exercise: walks in the evening with her dog    Nutrition Diagnosis (PES statement):     Obesity related to nutritional knowledge defect as evidence by patient subjective reports and BMI of 32.32     Nutrition Intervention  Food and/or Nutrient Delivery   Placed emphasis on importance of developing a healthy meal routine, aiming for 3 meals a day and no snacks.  Discussed using a protein supplement as a meal replacement.  Nutrition Education   Discussed with patient how to build a meal: the importance of including a lean/low fat protein at each meal, include a source of vegetables at a minimum of lunch and dinner and limiting carbohydrate intake  Educated on sources of lean protein, portion sizes, the amount of grams found in each source. Recommend patient to aim for 20-30g protein at each meal.    Goals established by patient:   stick to 3 meals per day aiming to be 4-6 hours apart  Aim for each to reach 25g protein to each meal  Stick to wheat over white bread    Handouts provided:  Keys to success  Recipe resources.    Assessment/Plan:    Follow up in 1 month with FREDDIE       Video-Visit Details    Type of service:  Video Visit    Video Start Time (time video started): 2:00 PM    Video End Time (time video stopped): 2:26 PM    Originating Location (pt. Location): Home      Distant Location (provider location):  Off-site    Mode of Communication:  Video Conference via [facility]    Physician has received verbal consent for a Video Visit from the patient? Yes      Jasmina Mckeon RD

## 2023-09-26 NOTE — PATIENT INSTRUCTIONS
Recipe Resources  Websites  www.Quietly.Re.Mu  www.Zarfo.Re.Mu  www.Kaleio.Re.Mu  https://www.BioVidria.Re.Mu/recipes-ideas/recipes  https://RABBL.Re.Mu/  www.Atlantic Tele-Networkrecipes.com  https://www.hungryOrionVM [employer] Superstructuregirl.com/  Recipes  Real Life, Good Food ([facility])         Eat Better ? Move More ? Live Well    Eat 3 nutrient-rich meals each day     Don't skip meals--it will cause you to overeat later in the day!     Eating fiber (vegetables/fruits/whole grains) and protein with meals helps you stay full longer     Choose foods with less than 10 grams of sugar and 5 grams of fat per serving to prevent excess calories and weight re-gain   Eat around the same times each day to develop a routine eating schedule    Avoid snacking unless physically hungry.   Planned snacks: 1-2 times per day and no more than 150 calories    Eat protein first    Protein helps with healing, maintaining adequate muscle mass, reducing hunger and optimizing nutritional status    Aim for 60-80 grams of protein per day   Fill up on Fiber    Fiber comes from plants--fruits, veggies, whole grains, nuts/seeds and beans    Fiber is low in calories, high in phytonutrients and helps you stay full longer    Aim for 25-35 grams per day by eating fiber with meals and snacks  Eat S-L-[id number]-W-L-Y    Take 20-30 minutes to eat each meal by taking small bites, chewing foods to applesauce consistency or 20-30 times before you swallow    Eating foods too fast can delay satiety/fullness signals and increase overeating   Slow down your eating by using toddler utensils, putting your fork/spoon down between bites and not watching TV or emailing during meals!   Keep a Journal          Writing down what you eat, how you feel and when you are active helps you identify new changes to work on from week to week          Look for ways to cut 100 calories from your current diet 2-3 times per day  Drink 64 ounces of 0-Calorie drinks between meals    Water    Zero calorie Propel  or  Vitamin Water      SoBe Lifewater  Zero Calories    Crystal Light , Sugar-Free Alberto-Aid , and other sugar-free lemonade or flavored gasca    Keep Caffeine to less than 300mg per day ie: 3-6oz cups coffee     Work up to 45-60 minutes of physical activity most days of the week    Helps with losing weight and prevent regaining those extra pounds!     Do a combo of cardio (walking/water exercises) and strength training (lifting weights/Vinyasa yoga)    Avoid Mindless Eating    Be present when you eat--take note of the smell, taste and quality of your food    Make a list of alternative activities you could do to prevent eating out of boredom/stress  Go for a walk, call a friend, chew gum, paint your nails, re-organize the garage, etc      LEAN PROTEIN SOURCES    Protein Source Portion Calories Grams of Protein                           Nonfat, plain Greek yogurt    (10 grams sugar or less) 3/4 cup (6 oz)  12-17   Light Yogurt (10 grams sugar or less) 3/4 cup (6 oz)  6-8   Protein Shake 1 shake 110-180 15-30   Skim/1% Milk or lactose-free milk 1 cup ( 8 oz)  8   Plain or light, flavored soymilk 1 cup  7-8   Plain or light, hemp milk 1 cup 110 6   Fat Free or 1% Cottage Cheese 1/2 cup 90 15   Part skim ricotta cheese 1/2 cup 100 14   Part skim or reduced fat cheese slices 1/4cup, 3 dice 65-80 8     Mozzarella String Cheese 1 80 8   Canned tuna, chicken, crab or salmon  (canned in water)  1/2 cup 100 15-20   White fish (broiled, grilled, baked) 3 ounces 100 21   [place]/Tuna (broiled, grilled, baked) 3 ounces 150-180 21   Shrimp, Scallops, Lobster, Crab 3 ounces 100 21   Pork loin, Pork Tenderloin 3 ounces 150 21   Boneless, skinless chicken /turkey breast                          (broiled, grilled, baked) 3 ounces 120 21   Brave, [place], Lamb, and Venison 3 ounces 120 21   Lean cuts of red meat and pork (sirloin,   round, tenderloin, flank, ground 93%-96%) 3 ounces 170 21   Lean or Extra Lean Ground  Turkey 1/2 cup 150 20   90-95% Lean [place] Burger 1 justin 140-180 21   Low-fat casserole with lean meat 3/4 cup 200 17   Luncheon Meats                                                        (turkey, lean ham, roast beef, chicken) 3 ounces 100 21   Egg (boiled, poached, scrambled) 1 Egg 60 7   Egg Substitute 1/2 cup 70 10   Nuts (limit to 1 serving per day)  3 Tbsp. 150 7   Nut [place] (peanut, almond)  Limit to 1 serving or less daily 1 Tbsp. 90 4   Soy Burger (varies) 1  10-15   Edamame  1/2 cup ~95 9   Garbanzo, Black, Recio Beans 1/2 cup 110 7   Refried Beans 1/2 cup 100 7   Kidney and Lima beans 1/2 cup 110 7   Tempeh 3 oz 175 18   Vegan crumbles 1/2 cup 100 14   Tofu 1/2 cup 110 14   Chili (beans and extra lean beef or turkey) 1 cup 200 23   Lentil Stew/Soup 1 cup 150 12   Black Bean Soup 1 cup 175 12     Carbohydrates  Carbohydrates fuel your body with glucose (sugar)--the energy your body needs so you can do your daily activities.  Carbohydrates offer an immediate source of energy for your body. They provide the fuel for your muscles and organs, such as your brain.     Types of Carbohydrates     Complex Carbohydrates are higher in fiber and keep you feeling full longer--helping you eat less.   These are found in nearly all plant-based foods and usually take longer for the body to digest.  They are most commonly found in whole-wheat bread, whole-grain pasta, brown rice, starchy vegetables,   and fruits  Refined Carbohydrates require almost NO WORK for digestion and break down into glucose more quickly   than complex carbohydrates. Refined carbohydrates are usually high in calories and low in nutrients and fiber--  eating more of these can lead to weight gain.  Thinking about eliminating carbohydrates???  If you do not eat enough carbohydrates, the following can occur:  Fatigue  Muscle cramps  Poor mental function  Fatigue easily results from deprivation of carbohydrates, which is seen in people who  fast, possibly interfering with activities of daily living.      Thinking about eliminating carbohydrates???  If you do not eat enough carbohydrates, the following can occur:  Fatigue  Muscle cramps  Poor mental function  Fatigue easily results from deprivation of carbohydrates, which is seen in people who fast, possibly interfering with activities of daily living    Carbohydrates are your body's first choice for fuel. If given a choice of several types of foods simultaneously, your body will use the energy from carbohydrates first.    What foods contain carbohydrates?  Choose the following foods containing carbohydrates (the BEST ones to eat):   Fruit-fresh, frozen, canned in their own juices  Whole grains:  Whole-wheat breads  Brown rice  Oatmeal  Whole-grain cereals  Other starchy foods containing a minimum of 3 grams (g) fiber/100 calories  The ingredient label should list whole wheat or whole grain as one of the first ingredients (bulgur, quinoa, buckwheat, millet, spelt, faro, kasha)  Milk or yogurt (a natural source of carbohydrates):  Low-fat milk  Fat-free milk  Yogurt   Beans or legumes     Starchy vegetables, raw or frozen:  Potatoes  Peas  Corn    AVOID or limit the following foods containing carbohydrates:  Refined sugars, such as in:  Candy  Desserts-ice cream, cakes, pies, brownies, frozen yogurt, sherbet/sorbet  Cookies  White flour: bread/pasta/crackers/rice/tortillas  Sugary snacks: sweetened cereal, granola bars, cereal bars, donuts, muffins, bagels  Sugary Drinks:  Fruit Juice, Smoothies  Sports Drinks  Regular Soda    What are typical serving sizes or portions?  The following are some serving and portion sizes for foods containing carbohydrates:  One medium piece of fruit, about 4?5 ounces (oz) (-tennis ball)  1 cup (C) berries or melon    C canned fruit    C juice (100% vegetable)    C starchy vegetables, cooked or chopped  One slice whole-grain bread  ? C brown rice, quinoa, buckwheat, millet,  valentine tucker kasha C oatmeal (dry)    C bulgur  One small tortilla (less than 6inch diameter)    C wheat germ  1 oz pretzels     C flaked cereal        Calorie-Controlled Sample Meal Plans    Examples of small healthy meals    Breakfast   Omelet made with   cup to   cup egg substitute or 2 eggs    cup chopped vegetables  1-2 tbsp. of light cheese     cup salsa  Medium banana    1 cup non-fat plain, Greek yogurt mixed with 1 cup berries and 1-2 Tbsp nuts or cereal   -3/4 cup skim or 1% cottage cheese    cup unsweetened whole-grain cereal  1/2 cup of fresh strawberries  Whole-wheat English muffin or mini bagel, 1 scrambled egg and 1 slice Swiss cheese   Small orange  Protein Bar or Shake (15-30 grams protein and 15-25 grams Carbohydrates)    cup cottage cheese, low-fat    cup fresh fruit    11 ounces of Slim Fast Low Carb (only), Rosalind's Advantage, EAS Carb Control    Lunch/Dinner  2-3 slices roasted turkey breast  1 tbsp. of fat free mayonnaise  2 slices of  whole-wheat bread, Medium apple  10 baby carrots with 1 tbsp. of low-fat dip     cup water packed tuna or chicken  1 tablespoons of low-fat mayonnaise  1-2 tbsp. dill relish  1 serving of whole-grain crackers  1 cup of strawberries   6 inch turkey sub sandwich with light mayonnaise,   cup cottage cheese                                                                                                                                                      Black bean and low-fat cheese on a whole wheat tortilla with salsa and light sour cream  Grilled chicken sandwich  Tossed salad with light dressing    Baked potato with 3/4 cup of extra lean ground beef, light shredded cheese and salsa  Fresh fruit                                                 Chicken chunks with lettuce and vegetables stuffed in jimmy  Steamed broccoli                                                 3 oz boneless/skinless chicken breast  1/2 cup brown rice with stir-fried vegetables     grapefruit  3 ounces of salmon, trout, or tuna  1 cup of steamed asparagus  1 small slice whole grain Italian bread  Broiled white or pink fish  3/4 cup whole wheat pasta with tomatoes  3/4 cup of roasted red peppers  3 oz. of extra lean (93/7) hamburger on a Arnold's [place] Thins  Tossed salad with light dressing       Black bean or Tuscan bean soup with grated mozzarella cheese    of a flour tortilla    3 ounces of grilled pork loin with 1 tbsp. of low-sugar barbeque sauce, 1 cup of green beans seasoned with pepper  Small dinner roll or   cup of grapefruit sections    1-2 cups of torn dane    cup of garbanzo beans or diced skinless chicken breast  5-6 cherry tomatoes  1  tbsp. of crumbled feta cheese  1 tbsp. of roasted soy nuts  1 tsp. of olive oil and 2-3 Tbsp. of balsamic or red wine vinegar  Small whole-wheat dinner roll or   cup of cut up pineapple

## 2023-09-27 ENCOUNTER — VIRTUAL VISIT (OUTPATIENT)
Dept: FAMILY MEDICINE | Facility: CLINIC | Age: 43
End: 2023-09-27

## 2023-09-27 DIAGNOSIS — E66.9 OBESITY: Primary | ICD-10-CM

## 2023-09-27 PROCEDURE — 99207 PR MWM HEALTH COACH NO CHARGE: CPT

## 2023-09-27 NOTE — PROGRESS NOTES
Reason for Visit: 24-Week Healthy Lifestyle Plan Follow up Visit - Health Coaching Virtual Visit    Progress Notes:  Return 24-Week Healthy Lifestyle Plan Weight Management Health Coaching Note - Virtual Visit  Shirlene Rodriguez   MRN: [id number]  : 1980  FELI: 2023    Health  Follow-up Visit #: 2  [facility]  Location: off-site/home office  Start time: 1:30 pm  End time: 2:01 pm      Assessment:  Initial Start Date: 2023  Graduation Date: 2024  Alex (online resource) enrollment date(s): 2023  Physician:  Dr. Tinoco  Referred by: self  Initial Weight (lbs): 192 lbs.  Current Weight (lbs): 192 lbs.  Average Daily Water (ounces): - oz/day  Weight Loss Medication: Wegovy  Nutrition Plan: real whole foods       Level of Satisfaction: (Health  Visits - midpoint & end of program):  Rate your current level of satisfaction on a scale of 1-10 in each pillar.    Nutrition (1 -10): 5    Exercise/Activity/Movement (1 -10): 7    Sleep (1 -10): 5    Stress Management (1 -10): will complete at visit #3 on 10/9/23 at 1:30 pm      Conversation today :  Nutrition: some knowledge based on rec from FREDDIE Dickson. Has good idea of sources of protein for example. Working on this. Enjoys grocery shopping. I do love food.  Exercise/Movement/Activity: this is my time of year, enjoys walking and get fresh air. When the weather gets colder, limits time outdoors. Looking at getting a new bike. Doesn't like how I look and how clothes are fitting.  Sleep: Loves to sleep but never really feels rested. Even with 8+ hours/day. Loves sleep. Does experience times in the day where she feels tired. Kids coming into the room,  snoring, dog. Good routine, bed by 8:30 pm, up by 4:30 am and get ready for work. Did have a referral for a sleep test but didn't follow thru on this. Not a lot of concern at this time.  Stress Management: -  Other: off this Friday to radha choose new  jagruti and order for new      Previous Goal(s) review:  Goals established by patient with FERDDIE Dickson on :  stick to 3 meals per day aiming to be 4-6 hours apart  Aim for each to reach 25g protein to each meal  Stick to wheat over white bread      GOALS established today  by client:  See above goals set with Yessi yesterday      Handouts provided by FREDDIE Dickson:  [place] to success  Recipe resources         NOTES:    Works full-time with [employer] for 19 years in November   to  Jose De Jesus (works in automotive industry), been together for 22 years.  2 children: daughter 11, son 7 (football and gymnastics)  Lives in [place], grew up in [place] (4th out of 5 siblings), both parents have passed  1 cat and 1 dog (just turned 1 yrs)  Favorite season: summer and fall. Doesn't enjoy winter  Own a camper and usually do a summer trip out west ([place] or [place] for example)      Follow-up appointments:    10/9 with CINDY Kidd #3 (stress assessment)  10/31 with FREDDIE Dickson follow up   with Dr. Tinoco follow up      Reminder, this is included in your 24-Week Healthy Lifestyle Plan:  Monthly Healthy Lifestyle Support Groups (with Meri Haro) offered virtually via ZOOM.  Contact the Scheduling Center at [phone] & request to be added to the specific date and they will add a ZOOM video appointment to your schedule.  Upcoming:  (, 12:30pm to 1:30pm):    : Let's Talk ~ Open Discussion to Share Wins and Challenges  November 3: Introduction to Mindfulness  : Let's Talk ~ Open Discussion to Share Wins and Challenges       SIGNATURE:  FAMILIA Zamora, [facility]  National Board-Certified Health &   24-Week Healthy Lifestyle Plan Health   [place] [facility]  email:  [email or web address]  appointment [phone]

## 2023-10-09 ENCOUNTER — VIRTUAL VISIT (OUTPATIENT)
Dept: SURGERY | Facility: CLINIC | Age: 43
End: 2023-10-09

## 2023-10-09 DIAGNOSIS — E66.9 OBESITY: Primary | ICD-10-CM

## 2023-10-09 PROCEDURE — 99207 PR MWM HEALTH COACH NO CHARGE: CPT

## 2023-10-09 NOTE — PROGRESS NOTES
Reason for Visit: 24-Week Healthy Lifestyle Plan Follow up Visit - Health Coaching Virtual Visit    Progress Notes:  Return 24-Week Healthy Lifestyle Plan Weight Management Health Coaching Note - Virtual Visit  Shirlene Rodriguez   MRN: [id number]  : 1980  FELI: 10/09/2023    Health  Follow-up Visit #: 3  [facility]  Location: off-site/home office  Start time: 1:31 pm  End time: 1:59 pm    Assessment:  Initial Start Date: 2023  Graduation Date: 2024  [place] (online resource) enrollment date(s): 2023  Physician:  Dr. Tinoco  Referred by: self  Initial Weight (lbs): 192 lbs.  Current Weight (lbs): 192 lbs.  Average Daily Water (ounces): - oz/day  Weight Loss Medication: Wegovy  Nutrition Plan: real whole foods       Level of Satisfaction: (Health  Visits - midpoint & end of program):  Rate your current level of satisfaction on a scale of 1-10 in each pillar.    Nutrition (1 -10): 5    Exercise/Activity/Movement (1 -10): 7    Sleep (1 -10): 5    Stress Management (1 -10): (see below)     Conversation today :  Nutrition: some knowledge based on rec from FREDDIE Dickson. Has good idea of sources of protein for example. Working on this. Enjoys grocery shopping. I do love food.  Exercise/Movement/Activity: this is my time of year, enjoys walking and get fresh air. When the weather gets colder, limits time outdoors. Looking at getting a new bike. Doesn't like how I look and how clothes are fitting.  Sleep: Loves to sleep but never really feels rested. Even with 8+ hours/day. Loves sleep. Does experience times in the day where she feels tired. Kids coming into the room,  snoring, dog. Good routine, bed by 8:30 pm, up by 4:30 am and get ready for work. Did have a referral for a sleep test but didn't follow thru on this. Not a lot of concern at this time.  Other: off this Friday to radha choose new jagruti and order for new      Conversation today  10/9:  Stress Management - 8  Able to process, pause first and then respond.      Conversation today 10/9:  Nutrition: paying attention to labels on food  Sleep: Q: What could she control or be in charge of to help support her sleep. A: new pillows, new mattress, changing   Stress Management: Previously would tend to over-react to situations. Ask great questions. Think before respond. Pause, Breathe, Respond      Previous Goal(s) review:  Goals established by patient with FREDDIE Dickson on 9/26:  stick to 3 meals per day aiming to be 4-6 hours apart  Aim for each to reach 25g protein to each meal  Stick to wheat over white bread - doesn't eat a lot of bread       GOALS established today 10/9 by client:  Nutrition: 25 g/protein at each meal, reading labels  Sleep: Maybe thinking about adjusting bedtime, changing pillows or bedding with the cooler season (all examples of how she may impact her environment for sleeping)  Other: Planning on re-doing floors in their home. Making a decision      Resources Provided:   Handouts provided by FREDDIE Dickson:  [place] to success  Recipe resources      NOTES:    Works full-time with [employer] for 19 years in November   to  Jose De Jesus (works in automotive industry), been together for 22 years.  2 children: daughter 11, son 7 (football and gymnastics)  Lives in [place], grew up in [place] (4th out of 5 siblings), both parents have passed  1 cat and 1 dog (just turned 1 yrs)  Favorite season: summer and fall. Doesn't enjoy winter  Own a camper and usually do a summer trip out west ([place] or [place] for example)        Follow-up appointments:    10/24 with CINDY Kidd #4  10/31 appt with FREDDIE Dickson follow up  11/1 with Dr. Tinoco follow up        Reminder, this is included in your 24-Week Healthy Lifestyle Plan:  Healthy Lifestyle Group Coaching (with Meri Haro) offered virtually via [employer].  Contact the Scheduling Center at [phone] & request to be added to the specific date and  they will add a ZOOM video appointment to your schedule.  Upcoming:  (, 12:30pm to 1:30pm):    November 3: Introduction to Mindfulness  : Let's Talk ~ Open Discussion to Share Wins and Challenges       SIGNATURE:  FAMILIA Zamora, [facility]  National Board-Certified Health &   24-Week Healthy Lifestyle Plan Health   [place] [facility]  email:  [email or web address]  appointment [phone]

## 2023-10-09 NOTE — LETTER
10/9/2023         RE: Shirlene Rodriguez  [address] [place] [address]        Dear Colleague,    Thank you for referring your patient, Shirlene Rodriguez, to the [facility] SUR[facility]ERY [facility] AND BARIATRICS CARE [place]. Please see a copy of my visit note below.    Reason for Visit: 24-Week Healthy Lifestyle Plan Follow up Visit - Health Coaching Virtual Visit    Progress Notes:  Return 24-Week Healthy Lifestyle Plan Weight Management Health Coaching Note - Virtual Visit  Shirlene Rodriguez   MRN: [id number]  : 1980  FELI: 10/09/2023    Health  Follow-up Visit #: 3  [facility]  Location: off-site/home office  Start time: 1:31 pm  End time: 1:59 pm    Assessment:  Initial Start Date: 2023  Graduation Date: 2024  Alex (online resource) enrollment date(s): 2023  Physician:  Dr. Tinoco  Referred by: self  Initial Weight (lbs): 192 lbs.  Current Weight (lbs): 192 lbs.  Average Daily Water (ounces): - oz/day  Weight Loss Medication: Wegovy  Nutrition Plan: real whole foods       Level of Satisfaction: (Health  Visits - midpoint & end of program):  Rate your current level of satisfaction on a scale of 1-10 in each pillar.    Nutrition (1 -10): 5    Exercise/Activity/Movement (1 -10): 7    Sleep (1 -10): 5    Stress Management (1 -10): (see below)     Conversation today :  Nutrition: some knowledge based on rec from FREDDIE Dickson. Has good idea of sources of protein for example. Working on this. Enjoys grocery shopping. I do love food.  Exercise/Movement/Activity: this is my time of year, enjoys walking and get fresh air. When the weather gets colder, limits time outdoors. Looking at getting a new bike. Doesn't like how I look and how clothes are fitting.  Sleep: Loves to sleep but never really feels rested. Even with 8+ hours/day. Loves sleep. Does experience times in the day where she feels tired. Kids coming into the room,   snoring, dog. Good routine, bed by 8:30 pm, up by 4:30 am and get ready for work. Did have a referral for a sleep test but didn't follow thru on this. Not a lot of concern at this time.  Other: off this Friday to radha choose new jagruti and order for new      Conversation today 10/9:  Stress Management - 8  Able to process, pause first and then respond.      Conversation today 10/9:  Nutrition: paying attention to labels on food  Sleep: Q: What could she control or be in charge of to help support her sleep. A: new pillows, new mattress, changing   Stress Management: Previously would tend to over-react to situations. Ask great questions. Think before respond. Pause, Breathe, Respond      Previous Goal(s) review:  Goals established by patient with FREDDIE Dickson on 9/26:  stick to 3 meals per day aiming to be 4-6 hours apart  Aim for each to reach 25g protein to each meal  Stick to wheat over white bread - doesn't eat a lot of bread       GOALS established today 10/9 by client:  Nutrition: 25 g/protein at each meal, reading labels  Sleep: Maybe thinking about adjusting bedtime, changing pillows or bedding with the cooler season (all examples of how she may impact her environment for sleeping)  Other: Planning on re-doing floors in their home. Making a decision      Resources Provided:   Handouts provided by FREDDIE Dickson:  [place] to success  Recipe resources      NOTES:    Works full-time with [employer] for 19 years in November   to  Jose De Jesus (works in automotive industry), been together for 22 years.  2 children: daughter 11, son 7 (football and gymnastics)  Lives in [place], grew up in [place] (4th out of 5 siblings), both parents have passed  1 cat and 1 dog (just turned 1 yrs)  Favorite season: summer and fall. Doesn't enjoy winter  Own a camper and usually do a summer trip out west ([place] or [place] for example)        Follow-up appointments:    10/24 with CINDY Kidd #4  10/31 appt with FREDDIE Dickson follow up  11/1  with Dr. Tinoco follow up        Reminder, this is included in your 24-Week Healthy Lifestyle Plan:  Healthy Lifestyle Group Coaching (with Meri Haro) offered virtually via ZOOM.  Contact the Scheduling Center at [phone] & request to be added to the specific date and they will add a ZOOM video appointment to your schedule.  Upcoming:  (, 12:30pm to 1:30pm):    November 3: Introduction to Mindfulness  : Let's Talk ~ Open Discussion to Share Wins and Challenges       SIGNATURE:  FAMILIA Zamora, [facility]  National Board-Certified Health &   24-Week Healthy Lifestyle Plan Health   [facility] Weight Management Program  email:  [email or web address]  appointment [phone]      Again, thank you for allowing me to participate in the care of your patient.        Sincerely,        Marti Gallagher

## 2023-10-24 ENCOUNTER — VIRTUAL VISIT (OUTPATIENT)
Dept: SURGERY | Facility: CLINIC | Age: 43
End: 2023-10-24

## 2023-10-24 DIAGNOSIS — E66.9 OBESITY: Primary | ICD-10-CM

## 2023-10-24 PROCEDURE — 99207 PR MWM HEALTH COACH NO CHARGE: CPT

## 2023-10-24 NOTE — PROGRESS NOTES
Reason for Visit: 24-Week Healthy Lifestyle Plan Follow up Visit - Health Coaching Virtual Visit    Progress Notes:  Return 24-Week Healthy Lifestyle Plan Weight Management Health Coaching Note - Virtual Visit  Shirlene Rodriguez   MRN: [id number]  : 1980  FELI: 10/24/2023    Health  Follow-up Visit #: 4  [facility]  Location: off-site/home office  Start time: 2:00 pm  End time: 2:30 pm    Assessment:  Initial Start Date: 2023  Graduation Date: 2024  Alex (online resource) enrollment date(s): 2023  Physician:  Dr. Tinoco  Referred by: self  Initial Weight (lbs): 192 lbs.  Current Weight (lbs): 186.7 lbs. (typically weighs a couple x/week)  Average Daily Water (ounces): - oz/day  Weight Loss Medication: Wegovy  Nutrition Plan: real whole foods     Previous Conversations over the past couple appointments:  10/9:  Level of Satisfaction: (Health  Visits - midpoint & end of program):  Rate your current level of satisfaction on a scale of 1-10 in each pillar.    Nutrition (1 -10): 5    Exercise/Activity/Movement (1 -10): 7    Sleep (1 -10): 5    Stress Management (1 -10): (see below)     Conversation :  Nutrition: some knowledge based on rec from FREDDIE Dikcson. Has good idea of sources of protein for example. Working on this. Enjoys grocery shopping. I do love food.  Exercise/Movement/Activity: this is my time of year, enjoys walking and get fresh air. When the weather gets colder, limits time outdoors. Looking at getting a new bike. Doesn't like how I look and how clothes are fitting.  Sleep: Loves to sleep but never really feels rested. Even with 8+ hours/day. Loves sleep. Does experience times in the day where she feels tired. Kids coming into the room,  snoring, dog. Good routine, bed by 8:30 pm, up by 4:30 am and get ready for work. Did have a referral for a sleep test but didn't follow thru on this. Not a lot of concern at this time.  Other:  off this Friday to radha choose new jagruti and order for new    Conversation 10/9:  Stress Management - 8  Able to process, pause first and then respond.        Conversation today 10/9:  Nutrition: paying attention to labels on food  Sleep: Q: What could she control or be in charge of to help support her sleep. A: new pillows, new mattress, changing   Stress Management: Previously would tend to over-react to situations. Ask great questions. Think before respond. Pause, Breathe, Respond      Conversation today 10/24:  Nutrition: going well - getting 25g/protein/meal. Snacking less and making pretty good choices. Breakfast sometimes consists of a protein bar.  Sleep: sometimes it comes and goes. Sometimes feeling like she is rested, some days she wishes she could sleep a little longer  Other: new dog, which happens to be the mom of their dog they already have.      Previous Goal(s) review:  Goals established by patient with FREDDIE Dickson on 9/26:  stick to 3 meals per day aiming to be 4-6 hours apart  Aim for each to reach 25g protein to each meal  Stick to wheat over white bread - doesn't eat a lot of bread        GOALS established today 10/9 by client:  Nutrition: 25 g/protein at each meal, reading labels - doing well, reading labels.   Sleep: Maybe thinking about adjusting bedtime, changing pillows or bedding with the cooler season (all examples of how she may impact her environment for sleeping)  Other: Planning on re-doing floors in their home. Making a decision (yes made decision on a color and ordered everything!)  Other: kids birthdays coming up in November on the 8th and 14th    GOALS established today by client:  Q: Do you have everything you need? A: Yes, for the most part. Working on making good choices.  Nutrition: 25 g/protein at each meal, reading labels - doing well, reading labels.   Other: Nov. 2 - will be getting floors redone in their home.  Other: kids birthday coming up in November      Resources  Provided:   Handouts provided by FREDDIE Dickson:  [place] to success  Recipe resources      NOTES:    Works full-time with Elena for 19 years in November   to  Jose De Jesus (works in automotive industry), been together for 22 years.  2 children: daughter 11 Lulu, son 7 Nakul (football and gymnastics)  Lives in [place], grew up in [place] (4th out of 5 siblings), both parents have passed  1 cat and 1 dog (just turned 1 yrs), just got another dog in October which happens to be the mom of the dog they already had (October 2023)  Favorite season: summer and fall. Doesn't enjoy winter  Own a camper and usually do a summer trip out west ([place] or [place] for example)      Follow-up appointments:    10/31 with FREDDIE Dickson  11/1 with Dr. Tinoco  11/13 with CINDY Kidd #5      Reminder, this is included in your 24-Week Healthy Lifestyle Plan:  *Healthy Lifestyle Group:  This is a 60 minute virtual coaching group for those who want to lead a healthier lifestyle. Come together to set goals and overcome barriers in a supportive group environment. We will address the four pillars of health: nutrition, exercise, sleep and emotional well-being.  This group is highly recommended for those who are participating in the 24 week Healthy Lifestyle Plan and our Health Coaching sessions.    When: This group meets the first Friday of the month, 12:30 PM - 1:30 PM online, via a zoom meeting.      Facilitator: Led by National Board-Certified Health and , Meri Haro [facility].    To [place]: Please call the Call Center at [phone] to register. You will get an appointment to attend in your [email or web address] upcoming appointments.  Fifteen minutes prior to the meeting, complete the e-check in and you will get the link to join the meeting. There is no charge to attend this group. Space is limited.    2023 Dates  November 3: Introduction to Mindfulness (Learn simple and effective mindfulness practices and how it can benefit  "you)  : Let's Talk ~ Open Discussion to Share Wins and Challenges     Dates  : New Years Vision: Manifest You Best !  : Let's Talk ~ Open Discussion to Share Wins and Challenges  : \"10 Percent Happier\" by Eliel Prather (Book Bites: a guided discussion on bite-sized nuggets of wisdom from favorite wellness books. No need to read the book; though highly encouraged if you are interested)  : Let's Talk ~ Open Discussion to Share Wins and Challenges  May 3: \"Essentialism: The Disciplined Pursuit of Less\" by Ishan Malhotra (Book Bites: guided discussion on bite-sized nuggets of wisdom from favorite wellness books. No need to read the book; though highly encouraged if you are interested)  : Let's Talk  JULY - NO MEETING - off for the  Holiday  August 2: \"The Blue Zones: Secrets for Living a Longer Life\" by Eliel Belle (Book Bites: guided discussion on bite-sized nuggets of wisdom from favorite wellness books. No need to read the book; though highly encouraged if you are interested)       SIGNATURE:  FAMILIA Zamora, [facility]  National Board-Certified Health &   24-Week Healthy Lifestyle Plan Health   [place] [facility]  email:  [email or web address]  appointment [phone]   "

## 2023-10-24 NOTE — LETTER
10/24/2023         RE: Shirlene Rodriguez  [address] [place] [address]        Dear Colleague,    Thank you for referring your patient, Shirlene Rodriguez, to the [facility] SURGERY CL[facility]IC AND BARIATRICS CARE [place]. Please see a copy of my visit note below.    Reason for Visit: 24-Week Healthy Lifestyle Plan Follow up Visit - Health Coaching Virtual Visit    Progress Notes:  Return 24-Week Healthy Lifestyle Plan Weight Management Health Coaching Note - Virtual Visit  Shirlene Rodriguez   MRN: [id number]  : 1980  FELI: 10/24/2023    Health  Follow-up Visit #: 4  [facility]  Location: off-site/home office  Start time: 2:00 pm  End time: 2:30 pm    Assessment:  Initial Start Date: 2023  Graduation Date: 2024  Alex (online resource) enrollment date(s): 2023  Physician:  Dr. Tinoco  Referred by: self  Initial Weight (lbs): 192 lbs.  Current Weight (lbs): 186.7 lbs. (typically weighs a couple x/week)  Average Daily Water (ounces): - oz/day  Weight Loss Medication: Wegovy  Nutrition Plan: real whole foods     Previous Conversations over the past couple appointments:  10/9:  Level of Satisfaction: (Health  Visits - midpoint & end of program):  Rate your current level of satisfaction on a scale of 1-10 in each pillar.    Nutrition (1 -10): 5    Exercise/Activity/Movement (1 -10): 7    Sleep (1 -10): 5    Stress Management (1 -10): (see below)     Conversation :  Nutrition: some knowledge based on rec from SAMANTA Dickson Has good idea of sources of protein for example. Working on this. Enjoys grocery shopping. I do love food.  Exercise/Movement/Activity: this is my time of year, enjoys walking and get fresh air. When the weather gets colder, limits time outdoors. Looking at getting a new bike. Doesn't like how I look and how clothes are fitting.  Sleep: Loves to sleep but never really feels rested. Even with 8+ hours/day. Loves sleep.  Does experience times in the day where she feels tired. Kids coming into the room,  snoring, dog. Good routine, bed by 8:30 pm, up by 4:30 am and get ready for work. Did have a referral for a sleep test but didn't follow thru on this. Not a lot of concern at this time.  Other: off this Friday to radha choose new jagruti and order for new    Conversation 10/9:  Stress Management - 8  Able to process, pause first and then respond.        Conversation today 10/9:  Nutrition: paying attention to labels on food  Sleep: Q: What could she control or be in charge of to help support her sleep. A: new pillows, new mattress, changing   Stress Management: Previously would tend to over-react to situations. Ask great questions. Think before respond. Pause, Breathe, Respond      Conversation today 10/24:  Nutrition: going well - getting 25g/protein/meal. Snacking less and making pretty good choices. Breakfast sometimes consists of a protein bar.  Sleep: sometimes it comes and goes. Sometimes feeling like she is rested, some days she wishes she could sleep a little longer  Other: new dog, which happens to be the mom of their dog they already have.      Previous Goal(s) review:  Goals established by patient with FREDDIE Dickson on 9/26:  stick to 3 meals per day aiming to be 4-6 hours apart  Aim for each to reach 25g protein to each meal  Stick to wheat over white bread - doesn't eat a lot of bread        GOALS established today 10/9 by client:  Nutrition: 25 g/protein at each meal, reading labels - doing well, reading labels.   Sleep: Maybe thinking about adjusting bedtime, changing pillows or bedding with the cooler season (all examples of how she may impact her environment for sleeping)  Other: Planning on re-doing floors in their home. Making a decision (yes made decision on a color and ordered everything!)  Other: kids birthdays coming up in November on the 8th and 14th    GOALS established today by client:  Q: Do you have  everything you need? A: Yes, for the most part. Working on making good choices.  Nutrition: 25 g/protein at each meal, reading labels - doing well, reading labels.   Other: Nov. 2 - will be getting floors redone in their home.  Other: kids birthday coming up in November      Resources Provided:   Handouts provided by FREDDIE Dickson:  [place] to success  Recipe resources      NOTES:    Works full-time with Elena for 19 years in November   to  Jose De Jesus (works in automotive industry), been together for 22 years.  2 children: daughter 11 Lulu, son 7 Nakul (football and gymnastics)  Lives in [place], grew up in [place] (4th out of 5 siblings), both parents have passed  1 cat and 1 dog (just turned 1 yrs), just got another dog in October which happens to be the mom of the dog they already had (October 2023)  Favorite season: summer and fall. Doesn't enjoy winter  Own a camper and usually do a summer trip out west ([place] or [place] for example)      Follow-up appointments:    10/31 with FREDDIE Dickson  11/1 with Dr. Tinoco  11/13 with Marti  #5      Reminder, this is included in your 24-Week Healthy Lifestyle Plan:  *Healthy Lifestyle Group:  This is a 60 minute virtual coaching group for those who want to lead a healthier lifestyle. Come together to set goals and overcome barriers in a supportive group environment. We will address the four pillars of health: nutrition, exercise, sleep and emotional well-being.  This group is highly recommended for those who are participating in the 24 week Healthy Lifestyle Plan and our Health Coaching sessions.    When: This group meets the first Friday of the month, 12:30 PM - 1:30 PM online, via a zoom meeting.      Facilitator: Led by National Board-Certified Health and , Meri Haro [facility].    To [place]: Please call the Call Center at [phone] to register. You will get an appointment to attend in your [facility] upcoming appointments.  Fifteen minutes  "prior to the meeting, complete the e-check in and you will get the link to join the meeting. There is no charge to attend this group. Space is limited.    2023 Dates  November 3: Introduction to Mindfulness (Learn simple and effective mindfulness practices and how it can benefit you)  : Let's Talk ~ Open Discussion to Share Wins and Challenges    2024: New Years Vision: Manifest You Best !  February 2: Let's Talk ~ Open Discussion to Share Wins and Challenges  : \"10 Percent Happier\" by Eliel Prather (Book Bites: a guided discussion on bite-sized nuggets of wisdom from favorite wellness books. No need to read the book; though highly encouraged if you are interested)  : Let's Talk ~ Open Discussion to Share Wins and Challenges  May 3: \"Essentialism: The Disciplined Pursuit of Less\" by Ishan Malhotra (Book Bites: guided discussion on bite-sized nuggets of wisdom from favorite wellness books. No need to read the book; though highly encouraged if you are interested)  : Let's Talk  JULY - NO MEETING - off for the  Holiday  August 2: \"The Blue Zones: Secrets for Living a Longer Life\" by Eliel Belle (Book Bites: guided discussion on bite-sized nuggets of wisdom from favorite wellness books. No need to read the book; though highly encouraged if you are interested)       SIGNATURE:  FAMILIA Zamora, [facility]  National Board-Certified Health &   24-Week Healthy Lifestyle Plan Health   [place] [facility] Weight Management [facility]  email:  [email or web address]  appointment [phone]       Again, thank you for allowing me to participate in the care of your patient.        Sincerely,        Marti Gallagher  "

## 2023-10-31 ENCOUNTER — VIRTUAL VISIT (OUTPATIENT)
Dept: SURGERY | Facility: CLINIC | Age: 43
End: 2023-10-31
Payer: COMMERCIAL

## 2023-10-31 DIAGNOSIS — E66.9 OBESITY (BMI 30-39.9): Primary | ICD-10-CM

## 2023-10-31 PROCEDURE — 97803 MED NUTRITION INDIV SUBSEQ: CPT | Mod: VID

## 2023-10-31 NOTE — PATIENT INSTRUCTIONS
Recipe Resources  Websites  www.InstantLuxe.QuickBlox  www.Great Technology.QuickBlox  www.Macromill.QuickBlox  https://www.Caprotec Bioanalytics.QuickBlox/recipes-ideas/recipes  https://SportSquare Games.QuickBlox/  www.Patient Conversation Mediarecipes.QuickBlox  https://www.InterMetro Communicationsgirl.com/  Recipes  Real Life, Good Food ([facility])     Blogs  https://KDPOFe.net/  SkinPopdeem.FST Life Sciences  https://www.[employer].com/c/Socialthingchcoen - affordable meal prep  https://mealprePrometheus Laboratories.QuickBlox/ - meal prep recipes      Apps  Mealime  Fooducate  Yummly  Allrecipes Dinner Spinner  [facility] Good Food  Myfitnesspal     Books  The Volumetrics Eating Plan- Josefina Novoa, Ph. D  The Easy 5- Ingredient Healthy Cookbook: Simple Recipes to Make Healthy Eating Delicious- Phi Puente, MS, RD, CDN  The 30 Minute Mediterranean Diet Cookbook- Aminata Ackerman MS, FREDDIEN and Nicole Yang RDN  Weeknight Wonders: Delicious, Healthy Dinners in 30 min or Less- Seema Green  Smart Meal Prep for Beginners- Phi Puente MS, FREDDIE, CDN

## 2023-10-31 NOTE — LETTER
10/31/2023         RE: Shirlene Rodriguez  [address] [place] [address]        Dear Colleague,    Thank you for referring your patient, Shirlene Rodriguez, to the [facility] SURGERY CLINIC AND BARIATRICS CARE [place]. Please see a copy of my visit note below.    Shirlene Rodriguez is a 43 year old who is being evaluated via a billable video visit.      How would you like to obtain your AVS? MyChart  If the video visit is dropped, the invitation should be resent by: Text to cell phone: [phone]  Will anyone else be joining your video visit? No            Medical  Weight Loss Follow-Up Diet Evaluation; 24 week program  RD visit 2/6  Assessment:  Shirlene is presenting today for a follow up weight management nutrition consultation.  This patient has had an initial appointment and was referred by Dr. Tinoco  for MNT as treatment for Obesity.  Weight loss medication: Semaglutide.   Pt's weight is 190 lbs   Initial weight: 192 lbs  Weight change: 2 lbs down          No data to display              BMI: There is no height or weight on file to calculate BMI.  Ideal body weight: 56.2 kg (123 lb 13.9 oz)  Adjusted ideal body weight: 68.6 kg (151 lb 2.6 oz)    Estimated RMR ([place]-St Jeor equation):   1530 kcals x 1.2 (sedentary) = 1830 kcals (for weight maintenance)  Recommended Protein Intake: 60-80 grams of protein/day  Patient Active Problem List:  Patient Active Problem List   Diagnosis     VANESSA (generalized anxiety disorder)     ASCUS with positive high risk HPV cervical     Acne     Acquired hypothyroidism     mirena IUD 8/25/2020     NAFLD (nonalcoholic fatty liver disease)     Class 1 obesity with serious comorbidity and body mass index (BMI) of 32.0 to 32.9 in adult     Diabetes: n/a    Progress on goals from last visit: Patient reports she is doing well nutritionally. Is reading nutrition labels. Switched to wheat bread. Aiming for 20-25g protein per meal. Recently bought another dog.   -  relying on protein bars for protein intake     stick to 3 meals per day aiming to be 4-6 hours apart - met   Aim for each to reach 25g protein to each meal - met   Stick to wheat over white bread - met     Dietary Recall:  Breakfast: yogurt and protein bar  Lunch: protein bar with cottage cheese   Dinner: stir hartman with chicken   Typical snacks: limiting   Eating out: rarely   Beverages: coffee with cream, water (60oz), soda (rarely)   Exercise: walks in the evening with her dog     Nutrition Diagnosis:    Obesity related to nutritional knowledge defect as evidence by patient subjective reports and BMI of 32.32        Intervention:  Food and/or nutrient delivery: encouraged patient to increase fruit and vegetable intake to 3 servings/day. Discussed ways to boost protein from whole foods vs supplements.  Nutrition education: discussed the 10/10 rule (10g or less of total sugar and total fat per serving)  Nutrition counseling: goals setting and continued support      Monitoring/Evaluation:    Goals:  Stick to 1 protein bar per daily  Follow 10/10 rule when grocery shopping   Aim for at least 1 serving of fruit and 2 serving of vegetables.     Patient to follow up in 1 month(s) with bariatrician and 1 month(s) with FREDDIE      Video-Visit Details    Type of service:  Video Visit    Video Start Time (time video started): 3:30 PM    Video End Time (time video stopped): 3:50 PM    Originating Location (pt. Location): Home      Distant Location (provider location):  Off-site    Mode of Communication:  Video Conference via [employer]    Physician has received verbal consent for a Video Visit from the patient? Yes      Jasmina Mckeon RD           Again, thank you for allowing me to participate in the care of your patient.        Sincerely,        Jasmina Mckeon RD

## 2023-10-31 NOTE — PROGRESS NOTES
Shirlene Rodriguez is a 43 year old who is being evaluated via a billable video visit.      How would you like to obtain your AVS? MyChart  If the video visit is dropped, the invitation should be resent by: Text to cell phone: [phone]  Will anyone else be joining your video visit? No            Medical  Weight Loss Follow-Up Diet Evaluation; 24 week program  RD visit 2/6  Assessment:  Shirlene is presenting today for a follow up weight management nutrition consultation.  This patient has had an initial appointment and was referred by Dr. Tinoco  for MNT as treatment for Obesity.  Weight loss medication: Semaglutide.   Pt's weight is 190 lbs   Initial weight: 192 lbs  Weight change: 2 lbs down          No data to display              BMI: There is no height or weight on file to calculate BMI.  Ideal body weight: 56.2 kg (123 lb 13.9 oz)  Adjusted ideal body weight: 68.6 kg (151 lb 2.6 oz)    Estimated RMR ([place]-St Jeor equation):   1530 kcals x 1.2 (sedentary) = 1830 kcals (for weight maintenance)  Recommended Protein Intake: 60-80 grams of protein/day  Patient Active Problem List:  Patient Active Problem List   Diagnosis    VANESSA (generalized anxiety disorder)    ASCUS with positive high risk HPV cervical    Acne    Acquired hypothyroidism    mirena IUD 8/25/2020    NAFLD (nonalcoholic fatty liver disease)    Class 1 obesity with serious comorbidity and body mass index (BMI) of 32.0 to 32.9 in adult     Diabetes: n/a    Progress on goals from last visit: Patient reports she is doing well nutritionally. Is reading nutrition labels. Switched to wheat bread. Aiming for 20-25g protein per meal. Recently bought another dog.   - relying on protein bars for protein intake     stick to 3 meals per day aiming to be 4-6 hours apart - met   Aim for each to reach 25g protein to each meal - met   Stick to wheat over white bread - met     Dietary Recall:  Breakfast: yogurt and protein bar  Lunch: protein bar with cottage  cheese   Dinner: stir hartman with chicken   Typical snacks: limiting   Eating out: rarely   Beverages: coffee with cream, water (60oz), soda (rarely)   Exercise: walks in the evening with her dog     Nutrition Diagnosis:    Obesity related to nutritional knowledge defect as evidence by patient subjective reports and BMI of 32.32        Intervention:  Food and/or nutrient delivery: encouraged patient to increase fruit and vegetable intake to 3 servings/day. Discussed ways to boost protein from whole foods vs supplements.  Nutrition education: discussed the 10/10 rule (10g or less of total sugar and total fat per serving)  Nutrition counseling: goals setting and continued support      Monitoring/Evaluation:    Goals:  Stick to 1 protein bar per daily  Follow 10/10 rule when grocery shopping   Aim for at least 1 serving of fruit and 2 serving of vegetables.     Patient to follow up in 1 month(s) with bariatrician and 1 month(s) with RD      Video-Visit Details    Type of service:  Video Visit    Video Start Time (time video started): 3:30 PM    Video End Time (time video stopped): 3:50 PM    Originating Location (pt. Location): Home      Distant Location (provider location):  Off-site    Mode of Communication:  Video Conference via [facility]    Physician has received verbal consent for a Video Visit from the patient? Yes      Jasmina Mckeon RD

## 2023-11-01 ENCOUNTER — TELEPHONE (OUTPATIENT)
Dept: FAMILY MEDICINE | Facility: CLINIC | Age: 43
End: 2023-11-01

## 2023-11-01 ENCOUNTER — OFFICE VISIT (OUTPATIENT)
Dept: FAMILY MEDICINE | Facility: CLINIC | Age: 43
End: 2023-11-01
Attending: FAMILY MEDICINE
Payer: COMMERCIAL

## 2023-11-01 VITALS
TEMPERATURE: 98.6 F | SYSTOLIC BLOOD PRESSURE: 128 MMHG | DIASTOLIC BLOOD PRESSURE: 85 MMHG | HEIGHT: 65 IN | BODY MASS INDEX: 32.04 KG/M2 | WEIGHT: 192.3 LBS | RESPIRATION RATE: 16 BRPM | OXYGEN SATURATION: 96 % | HEART RATE: 91 BPM

## 2023-11-01 DIAGNOSIS — E03.9 ACQUIRED HYPOTHYROIDISM: ICD-10-CM

## 2023-11-01 DIAGNOSIS — E66.811 CLASS 1 OBESITY WITH SERIOUS COMORBIDITY AND BODY MASS INDEX (BMI) OF 32.0 TO 32.9 IN ADULT, UNSPECIFIED OBESITY TYPE: ICD-10-CM

## 2023-11-01 DIAGNOSIS — K76.0 NAFLD (NONALCOHOLIC FATTY LIVER DISEASE): Primary | ICD-10-CM

## 2023-11-01 PROCEDURE — 99214 OFFICE O/P EST MOD 30 MIN: CPT | Performed by: FAMILY MEDICINE

## 2023-11-01 RX ORDER — NALTREXONE HYDROCHLORIDE AND BUPROPION HYDROCHLORIDE 8; 90 MG/1; MG/1
TABLET, EXTENDED RELEASE ORAL
Qty: 120 TABLET | Refills: 0 | Status: SHIPPED | OUTPATIENT
Start: 2023-11-01 | End: 2023-11-28

## 2023-11-01 NOTE — ASSESSMENT & PLAN NOTE
Hypothyroidism, tsh normal 5/2023. Continue levothyroxine 112 mcg, recheck if losing significant weight -continue physician assisted weight loss

## 2023-11-01 NOTE — ASSESSMENT & PLAN NOTE
STABLE OBESITY WITH BMI 32 AT INTAKE AND CHRONIC COMORBID WEIGHT RELATED CONDITIONS INCULDING: NAFLD, HYPOTHYROIDISM, AND NOTABLE ANXIETY (takes atenolol prn, buspar, and effexor)     ACTIVE MEDICALLY SUPERVISED WEIGHT LOSS STARTING AT There is no height or weight on file to calculate BMI. - planning to utilize low calorie diet, physical activity and  medications to facilitate this loss     Contraception - mirena and tubal ligation - she understands she cannot become pregnant on weight loss medications as they have generally, not been tested in pregnancy.      Patient started the 24-week weight loss program and has done 2 visits with Marti Gallagher.       Medications started today: Switched from Wegovy to Contrave due to supply chain shortage.    Current goal(s): Continue 24-week program and start Contrave.     Lab assessment plan: Labs were reviewed.  9/7/2023-normal A1c, normal BMP, normal vitamin D and increased LDL was noted on the lipid panel.     Currently not a candidate for surgery, but  if gaining weight may become a candidate for weight loss surgery. (If she enters class 2 obesity and has comorbid nafld)     Follow up 1-3 months.      DISCUSSION _________________________________________________________________     Dx:  Initial bmi 32.  With the following weight related comorbid medical conditions: nafld, hypothyroid, significant anxiety (takes prn atenolol, buspar and daily effexor)     BECAUSE OF ABOVE PROBLEMS IT IS STRONGLY ADVISED THAT Shirlene LOSE WEIGHT.  BELOW IS MY PLAN FOR her      KenyaGwen is her primary care provider - who referred her here today for help with weight loss.     Start weight 192 lbs, There is no height or weight on file to calculate BMI.  9/6/2023   Weight is stable at 192 pounds 11/1/2023    Medication notes:      Contrave-  11/1/2023 RX SENT    Saxenda - could consider  Wegovy -11/1/2023 patient is unable to get this medication due to supply chain shortage.  Will opt  for Contrave instead  Metformin - could consider.   Wellbutrin - could consider.   Naltrexone - could consider.   Phentermine/ diethylproprion - contraindicated due to anxiety  Topamax - could consider.   Qsymia - contraindicated due to anxiety   Orlistat - could consider.   Plenity - could consider.

## 2023-11-01 NOTE — PROGRESS NOTES
30 minutes of total clinic time was spent with this patient, and review of the medical record and with documentation.  This time was spent on the day of service.     Assessment & Plan   Problem List Items Addressed This Visit          Digestive    NAFLD (nonalcoholic fatty liver disease) - Primary     NAFLD - Wt reduction recommended, if gaining weight may become a candidate for weight loss surgery. (If she enters class 2 obesity and has comorbid nafld)          Class 1 obesity with serious comorbidity and body mass index (BMI) of 32.0 to 32.9 in adult     STABLE OBESITY WITH BMI 32 AT INTAKE AND CHRONIC COMORBID WEIGHT RELATED CONDITIONS INCULDING: NAFLD, HYPOTHYROIDISM, AND NOTABLE ANXIETY (takes atenolol prn, buspar, and effexor)     ACTIVE MEDICALLY SUPERVISED WEIGHT LOSS STARTING AT There is no height or weight on file to calculate BMI. - planning to utilize low calorie diet, physical activity and  medications to facilitate this loss     Contraception - mirena and tubal ligation - she understands she cannot become pregnant on weight loss medications as they have generally, not been tested in pregnancy.      Patient started the 24-week weight loss program and has done 2 visits with Marti Gallagher.       Medications started today: Switched from Wegovy to Contrave due to supply chain shortage.    Current goal(s): Continue 24-week program and start Contrave.     Lab assessment plan: Labs were reviewed.  9/7/2023-normal A1c, normal BMP, normal vitamin D and increased LDL was noted on the lipid panel.     Currently not a candidate for surgery, but  if gaining weight may become a candidate for weight loss surgery. (If she enters class 2 obesity and has comorbid nafld)     Follow up 1-3 months.      DISCUSSION _________________________________________________________________     Dx:  Initial bmi 32.  With the following weight related comorbid medical conditions: nafld, hypothyroid, significant anxiety (takes prn  atenolol, buspar and daily effexor)     BECAUSE OF ABOVE PROBLEMS IT IS STRONGLY ADVISED THAT Shirlene LOSE WEIGHT.  BELOW IS MY PLAN FOR her      ArtkratzGwen is her primary care provider - who referred her here today for help with weight loss.     Start weight 192 lbs, There is no height or weight on file to calculate BMI.  9/6/2023   Weight is stable at 192 pounds 11/1/2023    Medication notes:      Contrave-  11/1/2023 RX SENT    Saxenda - could consider  Wegovy -11/1/2023 patient is unable to get this medication due to supply chain shortage.  Will opt for Contrave instead  Metformin - could consider.   Wellbutrin - could consider.   Naltrexone - could consider.   Phentermine/ diethylproprion - contraindicated due to anxiety  Topamax - could consider.   Qsymia - contraindicated due to anxiety   Orlistat - could consider.   Plenity - could consider.          Relevant Medications    naltrexone-bupropion (CONTRAVE) 8-90 MG per 12 hr tablet       Endocrine    Acquired hypothyroidism     Hypothyroidism, tsh normal 5/2023. Continue levothyroxine 112 mcg, recheck if losing significant weight -continue physician assisted weight loss               Abigail Tinoco MD  [facility]   Shirlene is a 43 year old, presenting for the following health issues:  Follow Up (Wt loss) and Imm/Inj (Declined all vaccines today)        11/1/2023     2:11 PM   Additional Questions   Roomed by Benjamin Tan MA   Accompanied by Self         11/1/2023     2:11 PM   Patient Reported Additional Medications   Patient reports taking the following new medications None       History of Present Illness       Reason for visit:  Weight    She eats 0-1 servings of fruits and vegetables daily.She consumes 1 sweetened beverage(s) daily.She exercises with enough effort to increase her heart rate 30 to 60 minutes per day.  She exercises with enough effort to increase her heart rate 5 days per week.   She is taking  "medications regularly.             Objective    /85 (BP Location: Left arm, Patient Position: Sitting, Cuff Size: Adult Regular)   Pulse 91   Temp 98.6  F (37  C) (Oral)   Resp 16   Ht 1.642 m (5' 4.65\")   Wt 87.2 kg (192 lb 4.8 oz)   SpO2 96%   BMI 32.35 kg/m    Body mass index is 32.35 kg/m .  Physical Exam  Constitutional:       Appearance: Normal appearance.   HENT:      Head: Normocephalic and atraumatic.   Cardiovascular:      Rate and Rhythm: Normal rate and regular rhythm.   Pulmonary:      Effort: Pulmonary effort is normal.   Musculoskeletal:         General: Normal range of motion.      Cervical back: Normal range of motion and neck supple.   Neurological:      General: No focal deficit present.      Mental Status: She is alert and oriented to person, place, and time.                              "

## 2023-11-01 NOTE — PATIENT INSTRUCTIONS
Remembrer you have to start contrave slowly:  First week: take one tablet per day  Second week: one tablet morning, one tablet in afternoon  Third week: two tabs in the morning. One tab in the afternoon  Fourth week two tablets twice a day.

## 2023-11-01 NOTE — TELEPHONE ENCOUNTER
Prior Authorization Retail Medication Request    Medication/Dose: Contrave  ICD code (if different than what is on RX):  E66.9  Previously Tried and Failed:    Rationale:      Insurance Name:  Clearscript  Insurance ID:  [id number]    See Mike  Pharmacy Technician, [facility]  Phone: [phone]  Fax: [phone]

## 2023-11-06 NOTE — TELEPHONE ENCOUNTER
Central Prior Authorization Team   Phone: [phone]    PA Initiation    Medication: Contrave  Insurance Company: [employer] - Phone [phone] Fax [phone]  Pharmacy Filling the Rx: [facility] - [id number]  Filling Pharmacy Phone: [phone]  Filling Pharmacy Fax:    Start Date: 11/6/2023

## 2023-11-08 NOTE — TELEPHONE ENCOUNTER
Prior Authorization Approval    Authorization Effective Date: 11/6/2023  Authorization Expiration Date: 12/6/2024  Medication: Contrave  Reference #:     Insurance Company: [employer] - Phone [phone] Fax [phone]  Which Pharmacy is filling the prescription (Not needed for infusion/clinic administered): [facility] [place], [place] - [address]  Pharmacy Notified: Yes  Patient Notified: Instructed pharmacy to notify patient when script is ready to /ship.

## 2023-11-13 ENCOUNTER — VIRTUAL VISIT (OUTPATIENT)
Dept: SURGERY | Facility: CLINIC | Age: 43
End: 2023-11-13

## 2023-11-13 DIAGNOSIS — E66.9 OBESITY: Primary | ICD-10-CM

## 2023-11-13 PROCEDURE — 99207 PR MWM HEALTH COACH NO CHARGE: CPT

## 2023-11-13 NOTE — LETTER
2023         RE: Shrilene Rodriguez  [address] [place] [address]        Dear Colleague,    Thank you for referring your patient, Shirlene Rodriguez, to the [facility] SURGERY CLINIC AND BARIATRICS CARE [place]. Please see a copy of my visit note below.    Reason for Visit: 24-Week Healthy Lifestyle Plan Follow up Visit - Health Coaching Virtual Visit    Progress Notes:  Return 24-Week Healthy Lifestyle Plan Weight Management Health Coaching Note - Virtual Visit  Shirlene Rodriguez   MRN: [id number]  : 1980  FELI: 2023    Health  Follow-up Visit #: 5  [facility]  Location: off-site/home office  Start time: 2:00 pm  End time: 2:30 pm    Assessment:  Initial Start Date: 2023  Graduation Date: 2024  Alex (online resource) enrollment date(s): 2023  Physician:  Dr. Tinoco  Referred by: self  Initial Weight (lbs): 192 lbs.  Current Weight (lbs): 186.7 lbs. (typically weighs a couple x/week)  Average Daily Water (ounces): - oz/day  Weight Loss Medication: Contrave (just started and helping with hunger)  Nutrition Plan: real whole foods       Conversation today :  Nutrition: needs to grocery shop before Wed for girls weekend (Wed thru Sat)  Sleep: hasn't changed much even with time change. Noted she is feeling a bit better, not feeling as tired as she has in the past.  Stress Management/Emotional Health: feeling good, steady. No ups or downs.  Other: looking forward to iday, snuggling new great nephew  Other: girls retreat from Wednesday thru Saturday in [place] for crafting retreat (not a big crafter so will spend time reading, word find)      Previous Goal(s) review:  10/31 with FREDDIE Dickson Goals:  Stick to 1 protein bar per daily  Follow 10/10 rule when grocery shopping   Aim for at least 1 serving of fruit and 2 serving of vegetables.     Goals established 10/9 by client:  Nutrition: 25 g/protein at  each meal, reading labels - doing well, reading labels.   Sleep: Maybe thinking about adjusting bedtime, changing pillows or bedding with the cooler season (all examples of how she may impact her environment for sleeping)  Other: Planning on re-doing floors in their home. Making a decision (yes made decision on a color and ordered everything!)  Other: kids birthdays coming up in November on the 8th and 14th     Goals established by client:  Q: Do you have everything you need? A: Yes, for the most part. Working on making good choices.  Nutrition: 25 g/protein at each meal, reading labels - doing well, reading labels.   Other: Nov. 2 - will be getting floors redone in their home.  Other: kids birthday coming up in November      Goals established 11/13 by client:  Nutrition: grocery shopping for the time away this Wed thru Saturdays   Other: pack for the day/nights away.      Resources Provided:   Handouts provided by FREDDIE Dickson:  [place] to success  Recipe resources      NOTES:    Works full-time with [employer] for 19 years in November   to  Jose De Jesus (works in automotive industry), been together for 22 years.  2 children: daughter 11 Lulu, son 7 Nakul (football and gymnastics)  Lives in [place], grew up in [place] (4th out of 5 siblings), both parents have passed  1 cat and 1 dog (just turned 1 yrs), just got another dog (mom of their other dog) in October which happens to be the mom of the dog they already had (October 2023)  Favorite season: summer and fall. Doesn't enjoy winter  Own a camper and usually do a summer trip out west ([place] or [place] for example)      Follow-up appointments:   11/28 with FREDDIE Dickson   12/5 with CINDY Kidd #6 follow up        Reminder, this is included in your 24-Week Healthy Lifestyle Plan:  *Healthy Lifestyle Group:  This is a 60 minute virtual coaching group for those who want to lead a healthier lifestyle. Come together to set goals and overcome barriers in a supportive  "group environment. We will address the four pillars of health: nutrition, exercise, sleep and emotional well-being.  This group is highly recommended for those who are participating in the 24 week Healthy Lifestyle Plan and our Health Coaching sessions.    When: This group meets the first Friday of the month, 12:30 PM - 1:30 PM online, via a zoom meeting.      Facilitator: Led by National Board-Certified Health and , Meri Haro [facility].    To [place]: Please call the Call Center at [phone] to register. You will get an appointment to attend in your Take the Interviewhart upcoming appointments.  Fifteen minutes prior to the meeting, complete the e-check in and you will get the link to join the meeting. There is no charge to attend this group. Space is limited.    2023 Dates December 1: Let's Talk ~ Open Discussion to Share Wins and Challenges    2024 Dates January 5: New Years Vision: Manifest You Best 2024!  February 2: Let's Talk ~ Open Discussion to Share Wins and Challenges  March 1: \"10 Percent Happier\" by Eliel Prather (Book Bites: a guided discussion on bite-sized nuggets of wisdom from favorite wellness books. No need to read the book; though highly encouraged if you are interested)  April 5: Let's Talk ~ Open Discussion to Share Wins and Challenges  May 3: \"Essentialism: The Disciplined Pursuit of Less\" by Ishan Malhotra (Book Bites: guided discussion on bite-sized nuggets of wisdom from favorite wellness books. No need to read the book; though highly encouraged if you are interested)  June 7: Let's Talk  JULY - NO MEETING - off for the 4th of July Holiday  August 2: \"The Blue Zones: Secrets for Living a Longer Life\" by Eliel Belle (Book Bites: guided discussion on bite-sized nuggets of wisdom from favorite wellness books. No need to read the book; though highly encouraged if you are interested)         SIGNATURE:  Marti Gallagher, BS, [employer]-HWC  National Board-Certified Health & Wellness "   24-Week Healthy Lifestyle Plan Health   [facility]  email:  [email or web address]  appointment [phone]      Again, thank you for allowing me to participate in the care of your patient.        Sincerely,        Marti Gallagher

## 2023-11-13 NOTE — PROGRESS NOTES
Reason for Visit: 24-Week Healthy Lifestyle Plan Follow up Visit - Health Coaching Virtual Visit    Progress Notes:  Return 24-Week Healthy Lifestyle Plan Weight Management Health Coaching Note - Virtual Visit  Shirlene Rodriguez   MRN: [id number]  : 1980  FELI: 2023    Health  Follow-up Visit #: 5  [facility]  Location: off-site/home office  Start time: 2:00 pm  End time: 2:30 pm    Assessment:  Initial Start Date: 2023  Graduation Date: 2024  [employer] (online resource) enrollment date(s): 2023  Physician:  Dr. Tinoco  Referred by: self  Initial Weight (lbs): 192 lbs.  Current Weight (lbs): 186.7 lbs. (typically weighs a couple x/week)  Average Daily Water (ounces): - oz/day  Weight Loss Medication: Contrave (just started and helping with hunger)  Nutrition Plan: real whole foods       Conversation today :  Nutrition: needs to grocery shop before Wed for girls weekend (Wed thru Sat)  Sleep: hasn't changed much even with time change. Noted she is feeling a bit better, not feeling as tired as she has in the past.  Stress Management/Emotional Health: feeling good, steady. No ups or downs.  Other: looking forward to Thanksgiving Holiday, snuggling new great nephew  Other: girls retreat from Wednesday thru Saturday in [place] for crafting retreat (not a big crafter so will spend time reading, word find)      Previous Goal(s) review:  10/31 with FREDDIE Dickson Goals:  Stick to 1 protein bar per daily  Follow 10/10 rule when grocery shopping   Aim for at least 1 serving of fruit and 2 serving of vegetables.     Goals established 10/9 by client:  Nutrition: 25 g/protein at each meal, reading labels - doing well, reading labels.   Sleep: Maybe thinking about adjusting bedtime, changing pillows or bedding with the cooler season (all examples of how she may impact her environment for sleeping)  Other: Planning on re-doing floors in their home. Making a  decision (yes made decision on a color and ordered everything!)  Other: kids birthdays coming up in November on the 8th and 14th     Goals established by client:  Q: Do you have everything you need? A: Yes, for the most part. Working on making good choices.  Nutrition: 25 g/protein at each meal, reading labels - doing well, reading labels.   Other: Nov. 2 - will be getting floors redone in their home.  Other: kids birthday coming up in November      Goals established 11/13 by client:  Nutrition: grocery shopping for the time away this Wed thru Saturdays   Other: pack for the day/nights away.      Resources Provided:   Handouts provided by FREDDIE Dickson:  [place] to success  Recipe resources      NOTES:    Works full-time with [employer] for 19 years in November   to  Jose De Jesus (works in automotive industry), been together for 22 years.  2 children: daughter 11 Lulu, son 7 Nakul (football and gymnastics)  Lives in [place], grew up in [place] (4th out of 5 siblings), both parents have passed  1 cat and 1 dog (just turned 1 yrs), just got another dog (mom of their other dog) in October which happens to be the mom of the dog they already had (October 2023)  Favorite season: summer and fall. Doesn't enjoy winter  Own a camper and usually do a summer trip out west ([place] or [place] for example)      Follow-up appointments:   11/28 with FREDDIE Dickson   12/5 with CINDY Kidd #6 follow up        Reminder, this is included in your 24-Week Healthy Lifestyle Plan:  *Healthy Lifestyle Group:  This is a 60 minute virtual coaching group for those who want to lead a healthier lifestyle. Come together to set goals and overcome barriers in a supportive group environment. We will address the four pillars of health: nutrition, exercise, sleep and emotional well-being.  This group is highly recommended for those who are participating in the 24 week Healthy Lifestyle Plan and our Health Coaching sessions.    When: This group meets the  "first Friday of the month, 12:30 PM - 1:30 PM online, via a zoom meeting.      Facilitator: Led by National Board-Certified Health and , Meri Haro, [facility].    To [place]: Please call the Call Center at [phone] to register. You will get an appointment to attend in your [employer] upcoming appointments.  Fifteen minutes prior to the meeting, complete the e-check in and you will get the link to join the meeting. There is no charge to attend this group. Space is limited.    2023 1: Let's Talk ~ Open Discussion to Share Wins and Challenges    2024: New Years Vision: Manifest You Best !  : Let's Talk ~ Open Discussion to Share Wins and Challenges  : \"10 Percent Happier\" by Eliel Prather (Book Bites: a guided discussion on bite-sized nuggets of wisdom from favorite wellness books. No need to read the book; though highly encouraged if you are interested)  : Let's Talk ~ Open Discussion to Share Wins and Challenges  May 3: \"Essentialism: The Disciplined Pursuit of Less\" by Ishan Malhotra (Book Bites: guided discussion on bite-sized nuggets of wisdom from favorite wellness books. No need to read the book; though highly encouraged if you are interested)  : Let's Talk  JULY - NO MEETING - off for the  Holiday  August 2: \"The Blue Zones: Secrets for Living a Longer Life\" by Eliel Belle (Book Bites: guided discussion on bite-sized nuggets of wisdom from favorite wellness books. No need to read the book; though highly encouraged if you are interested)         SIGNATURE:  FAMILIA Zamora, [facility]  National Board-Certified Health &   24-Week Healthy Lifestyle Plan Health   [place] [facility]  email:  [email or web address]  appointment [phone]  "

## 2023-11-28 DIAGNOSIS — E66.811 CLASS 1 OBESITY WITH SERIOUS COMORBIDITY AND BODY MASS INDEX (BMI) OF 32.0 TO 32.9 IN ADULT, UNSPECIFIED OBESITY TYPE: ICD-10-CM

## 2023-11-28 RX ORDER — NALTREXONE HYDROCHLORIDE AND BUPROPION HYDROCHLORIDE 8; 90 MG/1; MG/1
2 TABLET, EXTENDED RELEASE ORAL 2 TIMES DAILY
Qty: 120 TABLET | Refills: 0 | Status: SHIPPED | OUTPATIENT
Start: 2023-11-28 | End: 2024-07-03

## 2023-12-05 ENCOUNTER — VIRTUAL VISIT (OUTPATIENT)
Dept: SURGERY | Facility: CLINIC | Age: 43
End: 2023-12-05

## 2023-12-05 DIAGNOSIS — E66.9 OBESITY: Primary | ICD-10-CM

## 2023-12-05 PROCEDURE — 99207 PR MWM HEALTH COACH NO CHARGE: CPT

## 2023-12-05 NOTE — LETTER
2023         RE: Shirlene Rodriguez  [address] [place] [address]        Dear Colleague,    Thank you for referring your patient, Shirlene Rodriguez, to the [facility]. Please see a copy of my visit note below.    Reason for Visit: 24-Week Healthy Lifestyle Plan Follow up Visit - Health Coaching Virtual Visit    Progress Notes:  Return 24-Week Healthy Lifestyle Plan Weight Management Health Coaching Note - Virtual Visit  Shirlene Rodriguez   MRN: [id number]  : 1980  FELI: 2023      Health  Follow-up Visit #: 5  [facility]  Location: off-site/home office  Start time: 2:00 pm  End time: 2:30 pm      Assessment:  Initial Start Date: 2023  Graduation Date: 2024  [employer] (online resource) enrollment date(s): 2023  Physician:  Dr. Tinoco  Referred by: self  Initial Weight (lbs): 192 lbs.  Current Weight (lbs): 188.5 lbs. (typically weighs a couple x/week) (had reached 185/186, fluctuating a little between some pounds)  Average Daily Water (ounces): - oz/day  Weight Loss Medication: Contrave (just started and helping with hunger)  Nutrition Plan: real whole foods       Conversation today :  Nutrition: good snack options for friends get-a-way (crafting weekend) and feeling okay in this area. Focusing on protein first. Wheat bread over white bread  Exercise/Movement/Activity: no specific exercise but a lot of movement thru her days  Sleep: Good and does have to get up with the new puppy for a bathroom break.  Stress Management: Energy is a little lower - busy running between activities. Not a lot of time to just sit and relax. Hustle & Bustle. Mindset of being busy  Other: Thanksgiving was good, a little bit of travel.  Other: Crafting get-a-way with a bunch of friends.      Previous Goal(s) review:  10/31 with FREDDIE Dickson Goals:  Stick to 1 protein bar per daily  Follow 10/10 rule when  grocery shopping   Aim for at least 1 serving of fruit and 2 serving of vegetables.        Goals established 10/9 by client:  Nutrition: 25 g/protein at each meal, reading labels - doing well, reading labels.   Sleep: Maybe thinking about adjusting bedtime, changing pillows or bedding with the cooler season (all examples of how she may impact her environment for sleeping)  Other: Planning on re-doing floors in their home. Making a decision (yes made decision on a color and ordered everything!)  Other: kids birthdays coming up in November on the 8th and 14th       Goals established by client:  Q: Do you have everything you need? A: Yes, for the most part. Working on making good choices.  Nutrition: 25 g/protein at each meal, reading labels - doing well, reading labels.   Other: Nov. 2 - will be getting floors redone in their home.  Other: kids birthday coming up in November (8th and 14th)        Goals established 11/13 by client:  Nutrition: grocery shopping for the time away this Wed thru Saturdays   Other: pack for the day/nights away.      GOALS established today 12/5 by client:  Consistency  Main focus: continue nutrition recommendations from FREDDIE Dickson: Protein first, 10/10 rule, mindful choices of foods, portion sizes. Regular schedule of breakfast and lunches with no snacking.      Resources Provided:   Handouts provided by FREDDIE Dickson:  [place] to success  Recipe resources      NOTES:    Works full-time with [employer] for 19 years in November   to  Jose De Jesus (works in automotive industry), been together for 22 years.  2 children: daughter 11 Lulu, son 7 Nakul (football and gymnastics)  Lives in [place], grew up in [place] (4th out of 5 siblings), both parents have passed  1 cat and 1 dog (just turned 1 yrs), just got another dog (mom of their other dog) in October which happens to be the mom of the dog they already had (October 2023)  Favorite season: summer and fall. Doesn't enjoy winter  Own a  "camper and usually do a summer trip out west ([place] or [place] for example)      Follow-up appointments:  1/4/24 with CINDY Kidd #7  RD appt with FREDDIE Dickson? TBD  Appt with Dr. Earnest RANDLE      Reminder, this is included in your 24-Week Healthy Lifestyle Plan:  *Healthy Lifestyle Group:  This is a 60 minute virtual coaching group for those who want to lead a healthier lifestyle. Come together to set goals and overcome barriers in a supportive group environment. We will address the four pillars of health: nutrition, exercise, sleep and emotional well-being.  This group is highly recommended for those who are participating in the 24 week Healthy Lifestyle Plan and our Health Coaching sessions.    When: This group meets the first Friday of the month, 12:30 PM - 1:30 PM online, via a zoom meeting.      Facilitator: Led by National Board-Certified Health and , Meri Haro [facility].    To [place]: Please call the Call Center at [phone] to register. You will get an appointment to attend in your [employer] upcoming appointments.  Fifteen minutes prior to the meeting, complete the e-check in and you will get the link to join the meeting. There is no charge to attend this group. Space is limited.    2024 Dates  January 5: New Years Vision: Manifest You Best 2024!  February 2: Let's Talk ~ Open Discussion to Share Wins and Challenges  March 1: \"10 Percent Happier\" by Eliel Prather (Book Bites: a guided discussion on bite-sized nuggets of wisdom from favorite wellness books. No need to read the book; though highly encouraged if you are interested)  April 5: Let's Talk ~ Open Discussion to Share Wins and Challenges  May 3: \"Essentialism: The Disciplined Pursuit of Less\" by Ishan Malhotra (Book Bites: guided discussion on bite-sized nuggets of wisdom from favorite wellness books. No need to read the book; though highly encouraged if you are interested)  June 7: Let's Talk  JULY - NO MEETING - off for the 4th of July " "Holiday  August 2: \"The Blue Zones: Secrets for Living a Longer Life\" by Eliel Belle (Book Bites: guided discussion on bite-sized nuggets of wisdom from favorite wellness books. No need to read the book; though highly encouraged if you are interested)         SIGNATURE:  FAMILIA Zamora, [facility]  National Board-Certified Health &   24-Week Healthy Lifestyle Plan Health   [place] [facility]  email:  [email or web address]  appointment [phone]      Again, thank you for allowing me to participate in the care of your patient.        Sincerely,        Marti Gallagher  "

## 2023-12-05 NOTE — PROGRESS NOTES
Reason for Visit: 24-Week Healthy Lifestyle Plan Follow up Visit - Health Coaching Virtual Visit    Progress Notes:  Return 24-Week Healthy Lifestyle Plan Weight Management Health Coaching Note - Virtual Visit  Shirlene Rodriguez   MRN: [id number]  : 1980  FELI: 2023      Health  Follow-up Visit #: 5  [facility]  Location: off-site/home office  Start time: 2:00 pm  End time: 2:30 pm      Assessment:  Initial Start Date: 2023  Graduation Date: 2024  [employer] (online resource) enrollment date(s): 2023  Physician:  Dr. Tinoco  Referred by: self  Initial Weight (lbs): 192 lbs.  Current Weight (lbs): 188.5 lbs. (typically weighs a couple x/week) (had reached 185/186, fluctuating a little between some pounds)  Average Daily Water (ounces): - oz/day  Weight Loss Medication: Contrave (just started and helping with hunger)  Nutrition Plan: real whole foods       Conversation today :  Nutrition: good snack options for friends get-a-way (crafting weekend) and feeling okay in this area. Focusing on protein first. Wheat bread over white bread  Exercise/Movement/Activity: no specific exercise but a lot of movement thru her days  Sleep: Good and does have to get up with the new puppy for a bathroom break.  Stress Management: Energy is a little lower - busy running between activities. Not a lot of time to just sit and relax. Hustle & Bustle. Mindset of being busy  Other: Thanksgiving was good, a little bit of travel.  Other: Crafting get-a-way with a bunch of friends.      Previous Goal(s) review:  10/31 with FREDDIE Dickson Goals:  Stick to 1 protein bar per daily  Follow 10/10 rule when grocery shopping   Aim for at least 1 serving of fruit and 2 serving of vegetables.        Goals established 10/9 by client:  Nutrition: 25 g/protein at each meal, reading labels - doing well, reading labels.   Sleep: Maybe thinking about adjusting bedtime, changing pillows or bedding  with the cooler season (all examples of how she may impact her environment for sleeping)  Other: Planning on re-doing floors in their home. Making a decision (yes made decision on a color and ordered everything!)  Other: kids birthdays coming up in November on the 8th and 14th       Goals established by client:  Q: Do you have everything you need? A: Yes, for the most part. Working on making good choices.  Nutrition: 25 g/protein at each meal, reading labels - doing well, reading labels.   Other: Nov. 2 - will be getting floors redone in their home.  Other: kids birthday coming up in November (8th and 14th)        Goals established 11/13 by client:  Nutrition: grocery shopping for the time away this Wed thru Saturdays   Other: pack for the day/nights away.      GOALS established today 12/5 by client:  Consistency  Main focus: continue nutrition recommendations from FREDDIE Dickson: Protein first, 10/10 rule, mindful choices of foods, portion sizes. Regular schedule of breakfast and lunches with no snacking.      Resources Provided:   Handouts provided by FREDDIE Dickson:  [place] to success  Recipe resources      NOTES:    Works full-time with [employer] for 19 years in November   to  Jose De Jesus (works in automotive industry), been together for 22 years.  2 children: daughter 11 Lulu, son 7 Nakul (football and gymnastics)  Lives in [place], grew up in [place] (4th out of 5 siblings), both parents have passed  1 cat and 1 dog (just turned 1 yrs), just got another dog (mom of their other dog) in October which happens to be the mom of the dog they already had (October 2023)  Favorite season: summer and fall. Doesn't enjoy winter  Own a camper and usually do a summer trip out west ([place] or [place] for example)      Follow-up appointments:  1/4/24 with CINDY Kidd #7  RD appt with FREDDIE Dickson? TBD  Appt with Dr. Earnest RANDLE      Reminder, this is included in your 24-Week Healthy Lifestyle Plan:  *Healthy Lifestyle  "Group:  This is a 60 minute virtual coaching group for those who want to lead a healthier lifestyle. Come together to set goals and overcome barriers in a supportive group environment. We will address the four pillars of health: nutrition, exercise, sleep and emotional well-being.  This group is highly recommended for those who are participating in the 24 week Healthy Lifestyle Plan and our Health Coaching sessions.    When: This group meets the first Friday of the month, 12:30 PM - 1:30 PM online, via a zoom meeting.      Facilitator: Led by National Board-Certified Health and , Meri Haro, [facility].    To [place]: Please call the Call Center at [phone] to register. You will get an appointment to attend in your [employer] upcoming appointments.  Fifteen minutes prior to the meeting, complete the e-check in and you will get the link to join the meeting. There is no charge to attend this group. Space is limited.    2024 Dates January 5: New Years Vision: Manifest You Best 2024!  February 2: Let's Talk ~ Open Discussion to Share Wins and Challenges  March 1: \"10 Percent Happier\" by Eliel Prather (Book Bites: a guided discussion on bite-sized nuggets of wisdom from favorite wellness books. No need to read the book; though highly encouraged if you are interested)  April 5: Let's Talk ~ Open Discussion to Share Wins and Challenges  May 3: \"Essentialism: The Disciplined Pursuit of Less\" by Ishan Malhotra (Book Bites: guided discussion on bite-sized nuggets of wisdom from favorite wellness books. No need to read the book; though highly encouraged if you are interested)  June 7: Let's Talk  JULY - NO MEETING - off for the 4th of July Holiday  August 2: \"The Blue Zones: Secrets for Living a Longer Life\" by Eliel Belle (Book Bites: guided discussion on bite-sized nuggets of wisdom from favorite wellness books. No need to read the book; though highly encouraged if you are interested)         SIGNATURE:  Marti " FAMILIA Gallagher, [facility]  National Board-Certified Health &   24-Week Healthy Lifestyle Plan Health   [place] [facility]  email:  [email or web address]  appointment [phone]

## 2023-12-16 DIAGNOSIS — E66.811 CLASS 1 OBESITY WITH SERIOUS COMORBIDITY AND BODY MASS INDEX (BMI) OF 32.0 TO 32.9 IN ADULT, UNSPECIFIED OBESITY TYPE: ICD-10-CM

## 2023-12-18 RX ORDER — NALTREXONE HYDROCHLORIDE AND BUPROPION HYDROCHLORIDE 8; 90 MG/1; MG/1
2 TABLET, EXTENDED RELEASE ORAL 2 TIMES DAILY
Qty: 360 TABLET | Refills: 0 | Status: SHIPPED | OUTPATIENT
Start: 2023-12-18 | End: 2024-04-02

## 2023-12-18 RX ORDER — NALTREXONE HYDROCHLORIDE AND BUPROPION HYDROCHLORIDE 8; 90 MG/1; MG/1
TABLET, EXTENDED RELEASE ORAL
Qty: 120 TABLET | Refills: 0 | OUTPATIENT
Start: 2023-12-18

## 2024-01-09 ENCOUNTER — VIRTUAL VISIT (OUTPATIENT)
Dept: SURGERY | Facility: CLINIC | Age: 44
End: 2024-01-09

## 2024-01-09 DIAGNOSIS — E66.9 OBESITY: Primary | ICD-10-CM

## 2024-01-09 PROCEDURE — 99207 PR MWM HEALTH COACH NO CHARGE: CPT | Mod: 93

## 2024-01-09 NOTE — PROGRESS NOTES
Reason for Visit: 24-Week Healthy Lifestyle Plan Follow up Visit - Health Coaching Virtual Visit    Progress Notes:  Return 24-Week Healthy Lifestyle Plan Weight Management Health Coaching Note - Virtual Visit  Shirlene Rodriguez   MRN: [id number]  : 1980  FELI: 2024    Health  Follow-up Visit #: 7  [facility]  Location: off-site/home office  Start time: 2:00 pm  End time: 2:30 pm      Assessment:  Initial Start Date: 2023  Graduation Date: 2024  Alex (online resource) enrollment date(s): 2023  Physician:  Dr. Tinoco  Referred by: self  Initial Weight (lbs): 192 lbs.  Current Weight (lbs): 183.6 lbs. (typically weighs a couple x/week) (had reached 185/186, fluctuating a little between some pounds)  Average Daily Water (ounces): - oz/day  Weight Loss Medication: Contrave (just started and helping with hunger)  Nutrition Plan: real whole foods       Conversation today 24:  Overall doing well. Been moving more already this year and feels like she is sleeping more. Waking up and she feels a bit more rested.  Other: Leaving in : dealing with parents house is complete.  : being more positive: eating more consciously, moving body, spending wisely       Previous Goal(s) review:  10/31 with FREDDIE Dickson Goals:  Stick to 1 protein bar per daily  Follow 10/10 rule when grocery shopping   Aim for at least 1 serving of fruit and 2 serving of vegetables.         Goals established 10/9 by client:  Nutrition: 25 g/protein at each meal, reading labels - doing well, reading labels.   Sleep: Maybe thinking about adjusting bedtime, changing pillows or bedding with the cooler season (all examples of how she may impact her environment for sleeping)  Other: Planning on re-doing floors in their home. Making a decision (yes made decision on a color and ordered everything!)  Other: kids birthdays coming up in November on the 8th and 14th        Goals established  by client:  Q: Do you have everything you need? A: Yes, for the most part. Working on making good choices.  Nutrition: 25 g/protein at each meal, reading labels - doing well, reading labels.   Other: Nov. 2 - will be getting floors redone in their home.  Other: kids birthday coming up in November (8th and 14th)        Goals established 11/13 by client:  Nutrition: grocery shopping for the time away this Wed thru Saturdays   Other: pack for the day/nights away.        Goals established today 12/5 by client:  Consistency  Main focus: continue nutrition recommendations from FREDDIE Dickson: Protein first, 10/10 rule, mindful choices of foods, portion sizes. Regular schedule of breakfast and lunches with no snacking.      GOALS established today 1/9/24 by client:  Nutrition: eating meals, no snacking, reading labels/choosing healthy options, drinking more water vs coffee (see above)  Other: next month: traveling to [place] for gymnastics meet in February  Other: 8-week challenge with her sister (started at the beginning of the year) - checking in with each other almost every day. Fridays are weigh check (body %)      Resources Provided:   Handouts provided by FREDDIE Dickson:  [place] to success  Recipe resources        NOTES:    Works full-time with [employer] for 19 years in November   to  Jose De Jesus (works in automotive industry), been together for 22 years.  2 children: daughter 11 Lulu, son 7 Nakul (football and gymnastics)  Lives in [place], grew up in [place] (4th out of 5 siblings), both parents have passed  1 cat and 1 dog (just turned 1 yrs), just got another dog (mom of their other dog) in October which happens to be the mom of the dog they already had (October 2023)  Favorite season: summer and fall. Doesn't enjoy winter  Own a camper and usually do a summer trip out west ([place] or [place] for example)      Follow-up appointments:    1/29/24 with CINDY Kidd #8  2/23 with Dr. Tinoco follow up  RD appt with  "FREDDIE Dickson? TBD      Reminder, this is included in your 24-Week Healthy Lifestyle Plan:  *Healthy Lifestyle Group:  This is a 60 minute virtual coaching group for those who want to lead a healthier lifestyle. Come together to set goals and overcome barriers in a supportive group environment. We will address the four pillars of health: nutrition, exercise, sleep and emotional well-being.  This group is highly recommended for those who are participating in the 24 week Healthy Lifestyle Plan and our Health Coaching sessions.    When: This group meets the first Friday of the month, 12:30 PM - 1:30 PM online, via a zoom meeting.      Facilitator: Led by National Board-Certified Health and , Meri Haro, [facility].    To [place]: Please call the Call Center at [phone] to register. You will get an appointment to attend in your [facility] upcoming appointments.  Fifteen minutes prior to the meeting, complete the e-check in and you will get the link to join the meeting. There is no charge to attend this group. Space is limited.    2024 Dates February 2: Let's Talk ~ Open Discussion to Share Wins and Challenges  March 1: \"10 Percent Happier\" by Eliel Prather (Book Bites: a guided discussion on bite-sized nuggets of wisdom from favorite wellness books. No need to read the book; though highly encouraged if you are interested)  April 5: Let's Talk ~ Open Discussion to Share Wins and Challenges  May 3: \"Essentialism: The Disciplined Pursuit of Less\" by Ishan Malhotra (Book Bites: guided discussion on bite-sized nuggets of wisdom from favorite wellness books. No need to read the book; though highly encouraged if you are interested)  June 7: Let's Talk  JULY - NO MEETING - off for the 4th of July Holiday  August 2: \"The Blue Zones: Secrets for Living a Longer Life\" by Eliel Belle (Book Bites: guided discussion on bite-sized nuggets of wisdom from favorite wellness books. No need to read the book; though highly encouraged " if you are interested)         SIGNATURE:  FAMILIA Zamora, [facility]  National Board-Certified Health &   24-Week Healthy Lifestyle Plan Health   [place] [facility]  email:  [email or web address]  appointment [phone]

## 2024-01-09 NOTE — LETTER
2024         RE: Shirlene Rodriguez  [id number] [place] [address]        Dear Colleague,    Thank you for referring your patient, Shirlene Rodriguez, to the [facility] AND BARIATRICS CARE [place]. Please see a copy of my visit note below.    Reason for Visit: 24-Week Healthy Lifestyle Plan Follow up Visit - Health Coaching Virtual Visit    Progress Notes:  Return 24-Week Healthy Lifestyle Plan Weight Management Health Coaching Note - Virtual Visit  Shirlene Rodriguez   MRN: [id number]  : 1980  FELI: 2024    Health  Follow-up Visit #: 7  [facility]  Location: off-site/home office  Start time: 2:00 pm  End time: 2:30 pm      Assessment:  Initial Start Date: 2023  Graduation Date: 2024  Alex (online resource) enrollment date(s): 2023  Physician:  Dr. Tinoco  Referred by: self  Initial Weight (lbs): 192 lbs.  Current Weight (lbs): 183.6 lbs. (typically weighs a couple x/week) (had reached 185/186, fluctuating a little between some pounds)  Average Daily Water (ounces): - oz/day  Weight Loss Medication: Contrave (just started and helping with hunger)  Nutrition Plan: real whole foods       Conversation today 24:  Overall doing well. Been moving more already this year and feels like she is sleeping more. Waking up and she feels a bit more rested.  Other: Leaving in : dealing with parents house is complete.  : being more positive: eating more consciously, moving body, spending wisely       Previous Goal(s) review:  10/31 with FREDDIE Dickson Goals:  Stick to 1 protein bar per daily  Follow 10/10 rule when grocery shopping   Aim for at least 1 serving of fruit and 2 serving of vegetables.         Goals established 10/9 by client:  Nutrition: 25 g/protein at each meal, reading labels - doing well, reading labels.   Sleep: Maybe thinking about adjusting bedtime, changing pillows or bedding with the cooler season  (all examples of how she may impact her environment for sleeping)  Other: Planning on re-doing floors in their home. Making a decision (yes made decision on a color and ordered everything!)  Other: kids birthdays coming up in November on the 8th and 14th        Goals established by client:  Q: Do you have everything you need? A: Yes, for the most part. Working on making good choices.  Nutrition: 25 g/protein at each meal, reading labels - doing well, reading labels.   Other: Nov. 2 - will be getting floors redone in their home.  Other: kids birthday coming up in November (8th and 14th)        Goals established 11/13 by client:  Nutrition: grocery shopping for the time away this Wed thru Saturdays   Other: pack for the day/nights away.        Goals established today 12/5 by client:  Consistency  Main focus: continue nutrition recommendations from FREDDIE Dickosn: Protein first, 10/10 rule, mindful choices of foods, portion sizes. Regular schedule of breakfast and lunches with no snacking.      GOALS established today 1/9/24 by client:  Nutrition: eating meals, no snacking, reading labels/choosing healthy options, drinking more water vs coffee (see above)  Other: next month: traveling to [place] for gymnastics meet in February  Other: 8-week challenge with her sister (started at the beginning of the year) - checking in with each other almost every day. Fridays are weigh check (body %)      Resources Provided:   Handouts provided by FREDDIE Dickson:  [place] to success  Recipe resources        NOTES:    Works full-time with [employer] for 19 years in November   to  Jose De Jesus (works in automotive industry), been together for 22 years.  2 children: daughter 11 Lulu, son 7 Nakul (football and gymnastics)  Lives in [place], grew up in [place] (4th out of 5 siblings), both parents have passed  1 cat and 1 dog (just turned 1 yrs), just got another dog (mom of their other dog) in October which happens to be the mom of the dog  "they already had (October 2023)  Favorite season: summer and fall. Doesn't enjoy winter  Own a camper and usually do a summer trip out west ([place] or [place] for example)      Follow-up appointments:    1/29/24 with CINDY Kidd #8  2/23 with Dr. Tinoco follow up  RD appt with FREDDIE Dickson? TBD      Reminder, this is included in your 24-Week Healthy Lifestyle Plan:  *Healthy Lifestyle Group:  This is a 60 minute virtual coaching group for those who want to lead a healthier lifestyle. Come together to set goals and overcome barriers in a supportive group environment. We will address the four pillars of health: nutrition, exercise, sleep and emotional well-being.  This group is highly recommended for those who are participating in the 24 week Healthy Lifestyle Plan and our Health Coaching sessions.    When: This group meets the first Friday of the month, 12:30 PM - 1:30 PM online, via a zoom meeting.      Facilitator: Led by National Board-Certified Health and , Meri Haro, [facility].    To [place]: Please call the Call Center at [phone] to register. You will get an appointment to attend in your [facility] upcoming appointments.  Fifteen minutes prior to the meeting, complete the e-check in and you will get the link to join the meeting. There is no charge to attend this group. Space is limited.    2024 Dates February 2: Let's Talk ~ Open Discussion to Share Wins and Challenges  March 1: \"10 Percent Happier\" by Eliel Prather (Book Bites: a guided discussion on bite-sized nuggets of wisdom from favorite wellness books. No need to read the book; though highly encouraged if you are interested)  April 5: Let's Talk ~ Open Discussion to Share Wins and Challenges  May 3: \"Essentialism: The Disciplined Pursuit of Less\" by Ishan Malhotra (Book Bites: guided discussion on bite-sized nuggets of wisdom from favorite wellness books. No need to read the book; though highly encouraged if you are interested)  June 7: Let's " "Talk  JULY - NO MEETING - off for the  Holiday  August 2: \"The Blue Zones: Secrets for Living a Longer Life\" by Eliel Belle (Book Bites: guided discussion on bite-sized nuggets of wisdom from favorite wellness books. No need to read the book; though highly encouraged if you are interested)         SIGNATURE:  FAMILIA Zamora, [facility]  National Board-Certified Health &   24-Week Healthy Lifestyle Plan Health   [place] [facility]  email:  [email or web address]  appointment [phone]      Again, thank you for allowing me to participate in the care of your patient.        Sincerely,        Marti Gallagher  "

## 2024-01-29 ENCOUNTER — VIRTUAL VISIT (OUTPATIENT)
Dept: SURGERY | Facility: CLINIC | Age: 44
End: 2024-01-29

## 2024-01-29 DIAGNOSIS — E66.9 OBESITY: Primary | ICD-10-CM

## 2024-01-29 PROCEDURE — 99207 PR MWM HEALTH COACH NO CHARGE: CPT | Mod: 93

## 2024-01-29 NOTE — LETTER
2024         RE: Shirlene Rodriguez  [address] [place] [address]        Dear Colleague,    Thank you for referring your patient, Shirlene Rodriguez, to the [facility]GERY CL[facility]IC AND BARIATRICS CARE [place]. Please see a copy of my visit note below.    Reason for Visit: 24-Week Healthy Lifestyle Plan Follow up Visit - Health Coaching Virtual Visit    Progress Notes:  Return 24-Week Healthy Lifestyle Plan Weight Management Health Coaching Note - Virtual Visit  Shirlene Rodriguez   MRN: [id number]  : 1980  FELI: 2024    Health  Follow-up Visit #: 8  [facility]  Location: off-site/home office  Start time: 2:02 pm  End time: 2:25 pm    Assessment:  Initial Start Date: 2023  Graduation Date: 2024  Alex (online resource) enrollment date(s): 2023  Physician:  Dr. Tinoco  Referred by: self  Initial Weight (lbs): 192 lbs.  Current Weight (lbs): 181.3 lbs. (typically weighs a couple x/week)  Average Daily Water (ounces): - oz/day  Weight Loss Medication: Contrave (just started and helping with hunger)  Nutrition Plan: real whole foods        Conversation 24:  Overall doing well. Been moving more already this year and feels like she is sleeping more. Waking up and she feels a bit more rested.  Other: Leaving in : dealing with parents house is complete.  : being more positive: eating more consciously, moving body, spending wisely     Conversation today 24:  Things are really going well.  Being mindful and aware of food choices and listening to body for hunger.  Planning ahead for upcoming trip to [place] over a weekend for gymnastics and having foods with her to take (easy snacks, protein shake, water, etc)      Previous Goal(s) review:  Goals established  by client:  Nutrition: grocery shopping for the time away this Wed thru    Other: pack for the day/nights away.        Goals established today   by client:  Consistency  Main focus: continue nutrition recommendations from FREDDIE Dickson: Protein first, 10/10 rule, mindful choices of foods, portion sizes. Regular schedule of breakfast and lunches with no snacking.        GOALS established 1/9/24 by client:  Nutrition: eating meals, no snacking, reading labels/choosing healthy options, drinking more water vs coffee (see above)  Other: next month: traveling to [place] for gymnastics meet in February  Other: 8-week challenge with her sister (started at the beginning of the year) - checking in with each other almost every day. Fridays are weigh check (body %)        GOALS established today 1/29/24 by client:  Consistency  Nutrition: continuing with being mindful and choosing healthy options. (Hasn't been snacking) Listening to her body.  Planning on taking protein shakes with on their trip to [place] (for gymnastics). Healthy snacks.  Other: end of June 2024: [place] in [place] to [place] their [place].       Resources Provided:   Handouts provided by FREDDIE Dickson:  [place] to success  Recipe resources      NOTES:    Works full-time with [employer] for 19 years in November   to  Jose De Jesus (works in [employer] industry), been together for 22 years.  2 children: daughter 11 Lulu, son 7 Nakul (football and gymnastics)  Lives in [place], grew up in [place] (4th out of 5 siblings), both parents have passed  1 cat and 1 dog (just turned 1 yrs), just got another dog (mom of their other dog) in October which happens to be the mom of the dog they already had (October 2023)  Favorite season: summer and fall. Doesn't enjoy winter  Own a camper and usually do a summer trip out west ([place] or [place] for example)      Follow-up appointments:    2/23 with Dr. Tnioco follow up        Reminder, this is included in your 24-Week Healthy Lifestyle Plan:  *Healthy Lifestyle Group:  This is a 60 minute virtual coaching group for those who want to lead a healthier  "lifestyle. Come together to set goals and overcome barriers in a supportive group environment. We will address the four pillars of health: nutrition, exercise, sleep and emotional well-being.  This group is highly recommended for those who are participating in the 24 week Healthy Lifestyle Plan and our Health Coaching sessions.    When: This group meets the first Friday of the month, 12:30 PM - 1:30 PM online, via a ZOOM meeting.      Facilitator: Led by National Board-Certified Health and , Meri Haro [facility].    To [place]: Please call the Call Center at [phone] to register. You will get an appointment to attend in your [employer] [facility] [employer] upcoming appointments.  Fifteen minutes prior to the meeting, complete the e-check in and you will get the link to join the meeting. There is no charge to attend this group. Space is limited.    2024 Dates February 2: Let's Talk ~ Open Discussion to Share Wins and Challenges  March 1: \"10 Percent Happier\" by Eliel Prather (Book Bites: a guided discussion on bite-sized nuggets of wisdom from favorite wellness books. No need to read the book; though highly encouraged if you are interested)  April 5: Let's Talk ~ Open Discussion to Share Wins and Challenges  May 3: \"Essentialism: The Disciplined Pursuit of Less\" by Ishan Malhotra (Book Bites: guided discussion on bite-sized nuggets of wisdom from favorite wellness books. No need to read the book; though highly encouraged if you are interested)  June 7: Let's Talk  JULY - NO MEETING - off for the 4th of July Holiday  August 2: \"The Blue Zones: Secrets for Living a Longer Life\" by Eliel Belle (Book Bites: guided discussion on bite-sized nuggets of wisdom from favorite wellness books. No need to read the book; though highly encouraged if you are interested)         SIGNATURE:  FAMILIA Zamora, [facility]  National Board-Certified Health &   24-Week Healthy Lifestyle Plan Health   [place] Comprehensive " Weight Management Program  email:  [email or web address]  appointment [phone]      Again, thank you for allowing me to participate in the care of your patient.        Sincerely,        Marti Gallagher

## 2024-01-29 NOTE — PROGRESS NOTES
Reason for Visit: 24-Week Healthy Lifestyle Plan Follow up Visit - Health Coaching Virtual Visit    Progress Notes:  Return 24-Week Healthy Lifestyle Plan Weight Management Health Coaching Note - Virtual Visit  Shirlene Rodriguez   MRN: [id number]  : 1980  FELI: 2024    Health  Follow-up Visit #: 8  [facility]  Location: off-site/home office  Start time: 2:02 pm  End time: 2:25 pm    Assessment:  Initial Start Date: 2023  Graduation Date: 2024  [employer] (online resource) enrollment date(s): 2023  Physician:  Dr. Tinoco  Referred by: self  Initial Weight (lbs): 192 lbs.  Current Weight (lbs): 181.3 lbs. (typically weighs a couple x/week)  Average Daily Water (ounces): - oz/day  Weight Loss Medication: Contrave (just started and helping with hunger)  Nutrition Plan: real whole foods        Conversation 24:  Overall doing well. Been moving more already this year and feels like she is sleeping more. Waking up and she feels a bit more rested.  Other: Leaving in : dealing with parents house is complete.  : being more positive: eating more consciously, moving body, spending wisely     Conversation today 24:  Things are really going well.  Being mindful and aware of food choices and listening to body for hunger.  Planning ahead for upcoming trip to [place] over a weekend for gymnastics and having foods with her to take (easy snacks, protein shake, water, etc)      Previous Goal(s) review:  Goals established  by client:  Nutrition: grocery shopping for the time away this Wed thru    Other: pack for the day/nights away.        Goals established today  by client:  Consistency  Main focus: continue nutrition recommendations from FREDDIE Dickson: Protein first, 10/10 rule, mindful choices of foods, portion sizes. Regular schedule of breakfast and lunches with no snacking.        GOALS established 24 by client:  Nutrition: eating  meals, no snacking, reading labels/choosing healthy options, drinking more water vs coffee (see above)  Other: next month: traveling to [place] for gymnastics meet in February  Other: 8-week challenge with her sister (started at the beginning of the year) - checking in with each other almost every day. Fridays are weigh check (body %)        GOALS established today 1/29/24 by client:  Consistency  Nutrition: continuing with being mindful and choosing healthy options. (Hasn't been snacking) Listening to her body.  Planning on taking protein shakes with on their trip to [place] (for gymnastics). Healthy snacks.  Other: end of June 2024[place].       Resources Provided:   Handouts provided by FREDDIE Dickson:  [place] to success  Recipe resources      NOTES:    Works full-time with [employer] for 19 years in November   to  Jose De Jesus (works in [employer] industry), been together for 22 years.  2 children: daughter 11 Lulu, son 7 Nakul (football and gymnastics)  Lives in [place], grew up in [place] (4th out of 5 siblings), both parents have passed  1 cat and 1 dog (just turned 1 yrs), just got another dog (mom of their other dog) in October which happens to be the mom of the dog they already had (October 2023)  Favorite season: summer and fall. Doesn't enjoy winter  Own a camper and usually do a summer trip out west ([place] or [place] for example)      Follow-up appointments:    2/23 with Dr. Tinoco follow up        Reminder, this is included in your 24-Week Healthy Lifestyle Plan:  *Healthy Lifestyle Group:  This is a 60 minute virtual coaching group for those who want to lead a healthier lifestyle. Come together to set goals and overcome barriers in a supportive group environment. We will address the four pillars of health: nutrition, exercise, sleep and emotional well-being.  This group is highly recommended for those who are participating in the 24 week  "Healthy Lifestyle Plan and our Health Coaching sessions.    When: This group meets the first Friday of the month, 12:30 PM - 1:30 PM online, via a ZOOM meeting.      Facilitator: Led by National Board-Certified Health and , Meri Haro [facility].    To [place]: Please call the Call Center at [phone] to register. You will get an appointment to attend [facility] your [facility] appointments.  Fifteen minutes prior to the meeting, complete the e-check in and you will get the link to join the meeting. There is no charge to attend this group. Space is limited.     Dates  February 2: Let's Talk ~ Open Discussion to Share Wins and Challenges  : \"10 Percent Happier\" by Eliel Prather (Book Bites: a guided discussion on bite-sized nuggets of wisdom from favorite wellness books. No need to read the book; though highly encouraged if you are interested)  : Let's Talk ~ Open Discussion to Share Wins and Challenges  May 3: \"Essentialism: The Disciplined Pursuit of Less\" by Ishan Malhotra (Book Bites: guided discussion on bite-sized nuggets of wisdom from favorite wellness books. No need to read the book; though highly encouraged if you are interested)  : Let's Talk  JULY - NO MEETING - off for the  Holiday  August 2: \"The Blue Zones: Secrets for Living a Longer Life\" by Eliel Belle (Book Bites: guided discussion on bite-sized nuggets of wisdom from favorite wellness books. No need to read the book; though highly encouraged if you are interested)         SIGNATURE:  Marti Gallagher BS, [facility]  National Board-Certified Health &   24-Week Healthy Lifestyle Plan Health   [place] [facility]  email:  [email or web address]  appointment [phone]    "

## 2024-02-12 DIAGNOSIS — L70.0 ACNE VULGARIS: ICD-10-CM

## 2024-02-12 NOTE — LETTER
February 13, 2024    Shirlene Rodriguez  [address] [place] [address]        Dear Shirlene,     We recently provided you with a medication refill. Prescription medications require routine follow-up appointments with your Dermatology Provider.      Per [facility] medication refill protocol, you do need to be seen at least annually to renew a prescription while on prescribed medication(s). A prescription is valid for only one year before it expires.      At this time we ask that: You schedule a routine August 2024 follow up Dermatology office visit to follow your Acne and renew your Spironolactone prescription.    Your prescription: Has been refilled as requested. This letter is sent as a courtesy so you may obtain an appointment date and time that works for your busy schedule.     We are currently booking Dermatology appointments into JULY 2024, so please schedule follow up Dermatology appointment as soon as possible to avoid going without medication.    [phone] to schedule or you may schedule via My chart. You may be seen for follow up with any of our 3 Dermatology Providers via telephone or virtual video if you prefer.     Sincerely,      Sasha Hughes PA-C/mmc

## 2024-02-12 NOTE — TELEPHONE ENCOUNTER
Requested Prescriptions   Pending Prescriptions Disp Refills    spironolactone (ALDACTONE) 100 MG tablet 90 tablet 1     Si tab po daily       There is no refill protocol information for this order        Last Written Prescription Date:  23  Last Fill Quantity: 90,  # refills: 1   Last office visit: 2023 ; last virtual visit: Visit date not found with prescribing provider:     Future Office Visit:

## 2024-02-13 RX ORDER — SPIRONOLACTONE 100 MG/1
TABLET, FILM COATED ORAL
Qty: 90 TABLET | Refills: 1 | Status: SHIPPED | OUTPATIENT
Start: 2024-02-13 | End: 2024-07-03

## 2024-02-13 NOTE — TELEPHONE ENCOUNTER
Due for appt in Aug, courtesy letter sent to ask pt to schedule Aug follow up Derm appt. Ingrid Gunter RN

## 2024-02-19 ENCOUNTER — VIRTUAL VISIT (OUTPATIENT)
Dept: SURGERY | Facility: CLINIC | Age: 44
End: 2024-02-19

## 2024-02-19 DIAGNOSIS — E66.9 OBESITY: Primary | ICD-10-CM

## 2024-02-19 PROCEDURE — 99207 PR MWM HEALTH COACH NO CHARGE: CPT | Mod: 93

## 2024-02-19 NOTE — LETTER
2024         RE: Shirlene Rodriguez  [address] [place] [place] [address]        Dear Colleague,    Thank you for referring your patient, Shirlene Rodriguez, to the [facility] [place]. Please see a copy of my visit note below.    Reason for Visit: 24-Week Healthy Lifestyle Plan Follow up Visit - Health Coaching Virtual Visit    Progress Notes:  Return 24-Week Healthy Lifestyle Plan Weight Management Health Coaching Note - Virtual Visit  Shirlene Rodriguez   MRN: [id number]  : 1980  FELI: 2024      Health  Follow-up Visit #: 9  [facility]  Location: off-site/home office  Start time: 2:00 pm  End time: 2:25 pm      Assessment:  Initial Start Date: 2023  Graduation Date: 2024  Alex (online resource) enrollment date(s): 2023  Physician:  Dr. Tinoco  Referred by: self  Initial Weight (lbs): 192 lbs.  Current Weight (lbs): did not report today: was previously 181.3 lbs. (typically weighs a couple x/week)  Average Daily Water (ounces): - oz/day  Weight Loss Medication: Contrave (just started and helping with hunger)  Nutrition Plan: real whole foods       Conversation today :  Overall feeling good about where her journey is right now. Eating better, drinking more water, going out with the dogs. Overall feels better. Fueling body with good nutrition.   Nutrition: feeling really good in this area. Recipes from her sister regularly.  Exercise/Movement/Activity: going outside with her dogs regulary  Sleep: feeling tired today. Dogs were up 3x/night so interrupted sleep.      Previous Goal(s) review:  GOALS established 24 by client:  Nutrition: eating meals, no snacking, reading labels/choosing healthy options, drinking more water vs coffee  Other: next month: traveling to [place] for gymnastics meet in February  Other: 8-week challenge with her sister (started at the beginning of the year) - checking  in with each other almost every day. Fridays are weigh check (body %) - nice accountability partner        GOALS established 1/29/24 by client:  Consistency  Nutrition: continuing with being mindful and choosing healthy options. (Hasn't been snacking) Listening to her body  Planning on taking protein shakes with on their trip to [place] (for gymnastics). Healthy snacks. - ended up not going as daughter wasn't feeling 100%.  Other: end of June 2024: spending a week in [place] to celebrate their anniversary      GOALS established today 2/19/24 by client:  Nutrition: 3 meals/day. No snacking and feels good thru the day.  Other: June 2024: spending a week in [place] to celebrate their anniversary (and mother-in-law joining them)      Resources Provided:   Handouts provided by FREDDIE Dickson:  [place] to success  Recipe resources        NOTES:    Works full-time with [employer] for 19 years in November   to  Jose De Jesus (works in automotive industry), been together for 22 years.  2 children: daughter 11 Lulu, son 7 Nakul (football and gymnastics)  Lives in [place], grew up in [place] (4th out of 5 siblings), both parents have passed  1 cat and 1 dog (just turned 1 yrs), just got another dog (mom of their other dog) in October which happens to be the mom of the dog they already had (October 2023)  Favorite season: summer and fall. Doesn't enjoy winter  Own a camper and usually do a summer trip out west ([place] or [place] for example)      Follow-up appointments:    2/23 with Dr. Tinoco follow-up  3/18 with Marti, HC #10  HC #11 final - schedule for April?            Reminder, this is included in your 24-Week Healthy Lifestyle Plan:  *Healthy Lifestyle Group:  This is a 60 minute virtual coaching group for those who want to lead a healthier lifestyle. Come together to set goals and overcome barriers in a supportive group environment. We will address the four pillars of health: nutrition, exercise, sleep and  "emotional well-being.  This group is highly recommended for those who are participating in the 24 week Healthy Lifestyle Plan and our Health Coaching sessions.    When: This group meets the first Friday of the month, 12:30 PM - 1:30 PM online, via a ZOOM meeting.      Facilitator: Led by National Board-Certified Health and , Meri Haro [facility].    To [place]: Please call the Call Center at [phone] to register. You will get an appointment to attend in your [facility] upcoming appointments.  Fifteen minutes prior to the meeting, complete the e-check in and you will get the link to join the meeting. There is no charge to attend this group. Space is limited.    2024 1: \"10 Percent Happier\" by Eliel Prather (Book Bites: a guided discussion on bite-sized nuggets of wisdom from favorite wellness books. No need to read the book; though highly encouraged if you are interested)  : Let's Talk ~ Open Discussion to Share Wins and Challenges  May 3: \"Essentialism: The Disciplined Pursuit of Less\" by Ishan Malhotra (Book Bites: guided discussion on bite-sized nuggets of wisdom from favorite wellness books. No need to read the book; though highly encouraged if you are interested)  : Let's Talk  JULY - NO MEETING - off for the  Hol 2: \"The Blue Zones: Secrets for Living a Longer Life\" by Eliel Belle (Book Bites: guided discussion on bite-sized nuggets of wisdom from favorite wellness books. No need to read the book; though highly encouraged if you are interested)         SIGNATURE:  FAMILIA Zamora, [facility]  National Board-Certified Health &   24-Week Healthy Lifestyle Plan Health   [place] [facility]  email:  [email or web address]  appointment [phone]      Again, thank you for allowing me to participate in the care of your patient.        Sincerely,        Marti Gallagher  "

## 2024-02-19 NOTE — PROGRESS NOTES
Reason for Visit: 24-Week Healthy Lifestyle Plan Follow up Visit - Health Coaching Virtual Visit    Progress Notes:  Return 24-Week Healthy Lifestyle Plan Weight Management Health Coaching Note - Virtual Visit  Shirlene Rodriguez   MRN: [id number]  : 1980  FELI: 2024      Health  Follow-up Visit #: 9  [facility]  Location: off-site/home office  Start time: 2:00 pm  End time: 2:25 pm      Assessment:  Initial Start Date: 2023  Graduation Date: 2024  [employer] (online resource) enrollment date(s): 2023  Physician:  Dr. Tinoco  Referred by: self  Initial Weight (lbs): 192 lbs.  Current Weight (lbs): did not report today: was previously 181.3 lbs. (typically weighs a couple x/week)  Average Daily Water (ounces): - oz/day  Weight Loss Medication: Contrave (just started and helping with hunger)  Nutrition Plan: real whole foods       Conversation today :  Overall feeling good about where her journey is right now. Eating better, drinking more water, going out with the dogs. Overall feels better. Fueling body with good nutrition.   Nutrition: feeling really good in this area. Recipes from her sister regularly.  Exercise/Movement/Activity: going outside with her dogs regulary  Sleep: feeling tired today. Dogs were up 3x/night so interrupted sleep.      Previous Goal(s) review:  GOALS established 24 by client:  Nutrition: eating meals, no snacking, reading labels/choosing healthy options, drinking more water vs coffee  Other: next month: traveling to [place] for gym[place]ipst[place] meet in February  Other: 8-week challenge with her sister (started at the beginning of the year) - checking in with each other almost every day.  are weigh check (body %) - nice accountability partner        GOALS established 24 by client:  Consistency  Nutrition: continuing with being mindful and choosing healthy options. (Hasn't been snacking) Listening to her body  Planning  on taking protein shakes with on their trip to [place] (for gymnastics). Healthy snacks. - ended up not going as daughter wasn't feeling 100%.  Other: end of June 2024: spending a week in [place] to celebrate their anniversary      GOALS established today 2/19/24 by client:  Nutrition: 3 meals/day. No snacking and feels good thru the day.  Other: June 2024: spending a week in [place] to celebrate their anniversary (and mother-in-law joining them)      Resources Provided:   Handouts provided by FREDDIE Dickson:  [place] to success  Recipe resources        NOTES:    Works full-time with [employer] for 19 years in November   to  Jose De Jesus (works in automotive industry), been together for 22 years.  2 children: daughter 11 Lulu, son 7 Nakul (football and gymnastics)  Lives in [place], grew up in [place] (4th out of 5 siblings), both parents have passed  1 cat and 1 dog (just turned 1 yrs), just got another dog (mom of their other dog) in October which happens to be the mom of the dog they already had (October 2023)  Favorite season: summer and fall. Doesn't enjoy winter  Own a camper and usually do a summer trip out west ([place] or [place] for example)      Follow-up appointments:    2/23 with Dr. Tinoco follow-up  3/18 with Marti, HC #10  HC #11 final - schedule for April?            Reminder, this is included in your 24-Week Healthy Lifestyle Plan:  *Healthy Lifestyle Group:  This is a 60 minute virtual coaching group for those who want to lead a healthier lifestyle. Come together to set goals and overcome barriers in a supportive group environment. We will address the four pillars of health: nutrition, exercise, sleep and emotional well-being.  This group is highly recommended for those who are participating in the 24 week Healthy Lifestyle Plan and our Health Coaching sessions.    When: This group meets the first Friday of the month, 12:30 PM - 1:30 PM online, via a ZOOM meeting.      Facilitator: Led  "by National Board-Certified Health and , Meri Haro, [facility].    To [place]: Please call the Call Center at [phone] to register. You will get an appointment to attend in your [facility] upcoming appointments.  Fifteen minutes prior to the meeting, complete the e-check in and you will get the link to join the meeting. There is no charge to attend this group. Space is limited.    2024 1: \"10 Percent Happier\" by Eliel Prather (Book Bites: a guided discussion on bite-sized nuggets of wisdom from favorite wellness books. No need to read the book; though highly encouraged if you are interested)  : Let's Talk ~ Open Discussion to Share Wins and Challenges  May 3: \"Essentialism: The Disciplined Pursuit of Less\" by Ishan Malhotra (Book Bites: guided discussion on bite-sized nuggets of wisdom from favorite wellness books. No need to read the book; though highly encouraged if you are interested)  : Let's Talk  JULY - NO MEETING - off for the  Holiday  August 2: \"The Blue Zones: Secrets for Living a Longer Life\" by Eliel Belle (Book Bites: guided discussion on bite-sized nuggets of wisdom from favorite wellness books. No need to read the book; though highly encouraged if you are interested)         SIGNATURE:  FAMILIA Zamora, [facility]  National Board-Certified Health &   24-Week Healthy Lifestyle Plan Health   [place] [facility]  email:  [email or web address]  appointment [phone]    "

## 2024-02-23 ENCOUNTER — OFFICE VISIT (OUTPATIENT)
Dept: FAMILY MEDICINE | Facility: CLINIC | Age: 44
End: 2024-02-23
Attending: FAMILY MEDICINE
Payer: COMMERCIAL

## 2024-02-23 VITALS
HEIGHT: 65 IN | SYSTOLIC BLOOD PRESSURE: 112 MMHG | OXYGEN SATURATION: 100 % | BODY MASS INDEX: 31.46 KG/M2 | HEART RATE: 94 BPM | WEIGHT: 188.8 LBS | DIASTOLIC BLOOD PRESSURE: 76 MMHG | TEMPERATURE: 98.4 F | RESPIRATION RATE: 16 BRPM

## 2024-02-23 DIAGNOSIS — K76.0 NAFLD (NONALCOHOLIC FATTY LIVER DISEASE): ICD-10-CM

## 2024-02-23 DIAGNOSIS — E66.811 CLASS 1 OBESITY WITH SERIOUS COMORBIDITY AND BODY MASS INDEX (BMI) OF 31.0 TO 31.9 IN ADULT, UNSPECIFIED OBESITY TYPE: Primary | ICD-10-CM

## 2024-02-23 PROCEDURE — 99214 OFFICE O/P EST MOD 30 MIN: CPT | Performed by: FAMILY MEDICINE

## 2024-02-23 ASSESSMENT — PATIENT HEALTH QUESTIONNAIRE - PHQ9
10. IF YOU CHECKED OFF ANY PROBLEMS, HOW DIFFICULT HAVE THESE PROBLEMS MADE IT FOR YOU TO DO YOUR WORK, TAKE CARE OF THINGS AT HOME, OR GET ALONG WITH OTHER PEOPLE: NOT DIFFICULT AT ALL
SUM OF ALL RESPONSES TO PHQ QUESTIONS 1-9: 2
SUM OF ALL RESPONSES TO PHQ QUESTIONS 1-9: 2

## 2024-02-23 ASSESSMENT — ANXIETY QUESTIONNAIRES
8. IF YOU CHECKED OFF ANY PROBLEMS, HOW DIFFICULT HAVE THESE MADE IT FOR YOU TO DO YOUR WORK, TAKE CARE OF THINGS AT HOME, OR GET ALONG WITH OTHER PEOPLE?: NOT DIFFICULT AT ALL
GAD7 TOTAL SCORE: 0
5. BEING SO RESTLESS THAT IT IS HARD TO SIT STILL: NOT AT ALL
6. BECOMING EASILY ANNOYED OR IRRITABLE: NOT AT ALL
2. NOT BEING ABLE TO STOP OR CONTROL WORRYING: NOT AT ALL
IF YOU CHECKED OFF ANY PROBLEMS ON THIS QUESTIONNAIRE, HOW DIFFICULT HAVE THESE PROBLEMS MADE IT FOR YOU TO DO YOUR WORK, TAKE CARE OF THINGS AT HOME, OR GET ALONG WITH OTHER PEOPLE: NOT DIFFICULT AT ALL
1. FEELING NERVOUS, ANXIOUS, OR ON EDGE: NOT AT ALL
7. FEELING AFRAID AS IF SOMETHING AWFUL MIGHT HAPPEN: NOT AT ALL
3. WORRYING TOO MUCH ABOUT DIFFERENT THINGS: NOT AT ALL
4. TROUBLE RELAXING: NOT AT ALL
7. FEELING AFRAID AS IF SOMETHING AWFUL MIGHT HAPPEN: NOT AT ALL

## 2024-02-23 NOTE — ASSESSMENT & PLAN NOTE
"RENAL PROGRESS NOTE - [facility]        CC: f/u JOHANA    Subjective: ongoing rt shoulder pain but some improvement since yesterday.  Had dialysis #1 today, denies sob. Feels tired today.  ASSESSMENT AND RECOMMENDATIONS:  1. JOHANA/ CKD 3b: baseline CKD due to longstanding DM2, HTN. Now severe ATN in setting of profound hypotension with sepsis, group a strep bacteremia and hypovolemia and contrast nephropathy after CTA, mild rhabdo. CTA on 7/17 with no hydronephrosis. UA on 7/18 very concentrated suggesting intact tubular function, although granular casts present.  Remains oliguric   -dialysis started 7/21 using non tunneled dialysis catheter    - will plan dialysis again tomorrow   -cont to monitor labs, urine output for e/o renal recovery    2. Hypotension: due to bacteremia/sepsis              -Pressors prn per ICU team              -volume up on exam, avoid excessive IVF        3. Metabolic acidosis: due to JOHANA.               -better today   -dialysis as above   -no need for further iv bicarb     4. HFrEF: EF of 25-30% based on 7/18 echo which also showed normal RVF, normal RA pressures.               -now volume up, plan 2-3 liter uf with dialysis tomorrow               -Dr. Arredondo following     5. Hyponatremia: due to JOHANA, hypervolemia  -dialysis against 134 sodium bath with uf as tolerated  -avoid excessive po fluid, IVF as able     6. NSTEMI: per Dr. Arredondo.           7. Bacteremia: Group a strep and rt shoulder myositis              -now on penicillin g and linezolid   -ongoing orthosurgical eval of shoulder         Lyudmila Medina MD  [facility]  [phone]    Objective    PHYSICAL EXAM  BP 92/50 (BP Location: Right leg, Cuff Size: Adult Regular)   Pulse 78   Temp 97.9  F (36.6  C) (Oral)   Resp 25   Ht 1.88 m (6' 2\")   Wt 106.1 kg (233 lb 14.4 oz)   SpO2 100%   BMI 30.03 kg/m    I/O last 3 completed shifts:  In: 2828.33 [I.V.:2828.33]  Out: 160 [Urine:160]  Wt " Improving weight, down 7lbs.   Are you experiencing any side effects to the medications:  none (contrave)  Hunger control:  good  Exercise was discussed: walks dogs 1 mile daily, 1 hour walk.   Discussed journaling food:  no, she thinks it might be helpful.   Patient is pleased with the current results:  yes.   The patient is following the nutrition plan:  she is eating veggies and protein.   Barriers to losing weight:  she is really tired all the time, and doesn't sleep well, so sleep consult is ordered.      Goals: work on getting sleep med consult to improve fatigue so she has energy to exercise/ be active. And try moving contrave times to when she is hungry to see if that helps. Would like her to eventually start strength training.    Readings from Last 3 Encounters:   07/20/23 106.1 kg (233 lb 14.4 oz)   11/17/22 100.2 kg (221 lb)   09/28/22 104.3 kg (230 lb)       GENERAL: nad, tired appearing  HEENT:normocephalic, atraumatic  CARDIOVASCULAR: rr, no rub, 1+ leg edema  PULMONARY:cta ant. No cyanosis  GASTROINTESTINAL: soft, nt/nd  MSK: rt shoulder pain with movement/unable to elevate rt arm  NEURO: Alert, no gross focal findings  PSYCHIATRIC: Adequate mood and interaction, tangential responses to questions  SKIN: Pale, no jaundice, no rash.    LABORATORIES  Recent Labs   Lab 07/21/23  0438 07/20/23  0448 07/19/23  1531 07/19/23  0951 07/19/23  0834 07/19/23  0515 07/19/23  0213 07/18/23  1005 07/18/23  0414 07/17/23  2316 07/17/23  1425 07/17/23  1300   * 128* 128* 128* 135* 128* 128*  126*   < > 124*  124*   < > 121* 119*   POTASSIUM 3.6 3.8 3.8 3.6 2.2* 3.9 3.7  3.6   < > 4.0  4.0   < > 5.4* 4.6   CHLORIDE 84* 92* 94* 94* 100 93* 95*  94*   < > 93*  93*   < > 86* 80*   CO2 19* 18* 14* 15* 10* 15* 14*  14*   < > 16*  16*   < > 15* 14*   .9* 96.9*  --  80.4*  --   --  75.3*  --  70.6*  --  63.1* 64.9*   CR 5.22* 4.71*  --  3.88*  --   --  3.60*  --  3.14*  --  2.94* 3.25*   GFRESTIMATED 12* 13*  --  17*  --   --  18*  --  21*  --  23* 20*   YOSVANY 7.6* 7.5*  --  7.3*  --   --  7.1*  --  8.4*  --  8.4* 9.2   MAG  --   --   --   --   --   --   --   --   --   --  1.9  --    ALBUMIN 2.1* 2.4*  --   --   --   --   --   --   --   --   --  3.1*    < > = values in this interval not displayed.       Recent Labs   Lab 07/21/23  0900 07/20/23  0448 07/19/23  1555 07/18/23  0414 07/17/23  1302   WBC 25.6* 21.3* 12.9* 8.4 14.5*   HGB 9.3* 8.2* 9.0* 12.5* 13.2*   HCT 26.2* 23.8* 26.8* 35.8* 38.5*   MCV 78 80 82 80 83    187 123* 191 247          MEDICATIONS    amiodarone  400 mg Oral Daily     aspirin  81 mg Oral Daily     docusate sodium  100 mg Oral BID     insulin aspart  1-10 Units Subcutaneous TID AC     insulin aspart  1-7 Units  Subcutaneous At Bedtime     [START ON 7/22/2023] insulin glargine  35 Units Subcutaneous QAM AC     linezolid  600 mg Intravenous Q12H     magnesium oxide  400 mg Oral Daily     multivitamin, therapeutic  1 tablet Oral Daily     penicillin G potassium  4 Million Units Intravenous Q8H     sodium chloride (PF)  10-40 mL Intracatheter Q7 Days     sodium chloride (PF)  3 mL Intracatheter Q8H     sodium chloride (PF)  9 mL Intracatheter During Dialysis/CRRT (from stock)     sodium chloride (PF)  9 mL Intracatheter During Dialysis/CRRT (from stock)     ticagrelor  90 mg Oral BID     vitamin C  500 mg Oral Daily         Lyudmila Medina MD  [facility]  [phone]

## 2024-02-23 NOTE — PROGRESS NOTES
"  Assessment & Plan   Problem List Items Addressed This Visit          Digestive    NAFLD (nonalcoholic fatty liver disease)       NAFLD - Wt reduction recommended, if gaining weight may become a candidate for weight loss surgery. (If she enters class 2 obesity and has comorbid nafld)          Class 1 obesity with serious comorbidity and body mass index (BMI) of 31.0 to 31.9 in adult - Primary     Improving weight, down 7lbs.   Are you experiencing any side effects to the medications:  none (contrave)  Hunger control:  good  Exercise was discussed: walks dogs 1 mile daily, 1 hour walk.   Discussed journaling food:  no, she thinks it might be helpful.   Patient is pleased with the current results:  yes.   The patient is following the nutrition plan:  she is eating veggies and protein.   Barriers to losing weight:  she is really tired all the time, and doesn't sleep well, so sleep consult is ordered.      Goals: work on getting sleep med consult to improve fatigue so she has energy to exercise/ be active. And try moving contrave times to when she is hungry to see if that helps. Would like her to eventually start strength training.               BMI  Estimated body mass index is 31.76 kg/m  as calculated from the following:    Height as of this encounter: 1.642 m (5' 4.65\").    Weight as of this encounter: 85.6 kg (188 lb 12.8 oz).       Molly Garber is a 44 year old, presenting for the following health issues:  Follow Up (Wt loss) and Imm/Inj (Declined all vaccines today)   Are you experiencing any side effects to the medications:  none (contrave)  Hunger control:  good  Exercise was discussed: walks dogs 1 mile daily, 1 hour walk.   Discussed journaling food:  no, she thinks it might be helpful.   Patient is pleased with the current results:  yes.   The patient is following the nutrition plan:  she is eating veggies and protein.   Barriers to losing weight:  she is really tired all the time, and doesn't sleep " "well, so sleep consult is ordered.          2/23/2024     1:47 PM   Additional Questions   Roomed by Benjamin Tan MA   Accompanied by Self         2/23/2024     1:47 PM   Patient Reported Additional Medications   Patient reports taking the following new medications None     History of Present Illness       Reason for visit:  Weight management    She eats 2-3 servings of fruits and vegetables daily.She consumes 0 sweetened beverage(s) daily.She exercises with enough effort to increase her heart rate 10 to 19 minutes per day.  She exercises with enough effort to increase her heart rate 3 or less days per week.   She is taking medications regularly.           Objective    /76 (BP Location: Left arm, Patient Position: Sitting, Cuff Size: Adult Regular)   Pulse 94   Temp 98.4  F (36.9  C) (Oral)   Resp 16   Ht 1.642 m (5' 4.65\")   Wt 85.6 kg (188 lb 12.8 oz)   SpO2 100%   BMI 31.76 kg/m    Body mass index is 31.76 kg/m .  Physical Exam  Constitutional:       Appearance: Normal appearance.   HENT:      Head: Normocephalic and atraumatic.   Cardiovascular:      Rate and Rhythm: Normal rate and regular rhythm.   Pulmonary:      Effort: Pulmonary effort is normal.   Musculoskeletal:         General: Normal range of motion.      Cervical back: Normal range of motion and neck supple.   Neurological:      General: No focal deficit present.      Mental Status: She is alert and oriented to person, place, and time.                  Signed Electronically by: Abigail Tinoco MD    "

## 2024-03-18 ENCOUNTER — VIRTUAL VISIT (OUTPATIENT)
Dept: SURGERY | Facility: CLINIC | Age: 44
End: 2024-03-18

## 2024-03-18 DIAGNOSIS — E66.9 OBESITY: Primary | ICD-10-CM

## 2024-03-18 PROCEDURE — 99207 PR MWM HEALTH COACH NO CHARGE: CPT | Mod: 93

## 2024-03-18 NOTE — LETTER
3/18/2024         RE: Shirlene Rodriguez  [address] [place] [address]        Dear Colleague,    Thank you for referring your patient, Shirlene Rodriguez, to the [facility] [place]. Please see a copy of my visit note below.    Reason for Visit: 24-Week Healthy Lifestyle Plan Follow up Visit - Health Coaching Virtual Visit    Progress Notes:  Return 24-Week Healthy Lifestyle Plan Weight Management Health Coaching Note - Virtual Visit  Shirlene Rodriguez   MRN: [id number]  : 1980  FELI: 2024      Health  Follow-up Visit #: 10  [facility]  Location: off-site/home office  Start time: 2:00 pm  End time: 2:25 pm      Assessment:  Initial Start Date: 2023  Graduation Date: 2024  Alex (online resource) enrollment date(s): 2023  Physician:  Dr. Tinoco  Referred by: self  Initial Weight (lbs): 192 lbs.  Current Weight (lbs): did not report today: was previously 181.3 lbs. (typically weighs a couple x/week)  Average Daily Water (ounces): - oz/day  Weight Loss Medication: Contrave (just started and helping with hunger)  Nutrition Plan: real whole foods        Conversation today 3/18/24:  Overall feeling good.   Nutrition: eating better  Exercise/Movement/Activity: walking the dogs on the nice days  Sleep: sleeping better  Other: have more energy than previously. Sun shining, feels so good!      Previous Goal(s) review:  Goals established 24 by client:  Consistency  Nutrition: continuing with being mindful and choosing healthy options. (Hasn't been snacking) Listening to her body  Planning on taking protein shakes with on their trip to [place] (for gymnastics). Healthy snacks. - ended up not going as daughter wasn't feeling 100%.  Other: end of 2024: spending a week in [place] to celebrate their anniversary        Goals established 24 by client:  Nutrition: 3 meals/day. No snacking and  feels good thru the day.  Other: June 2024: spending a week in [place] to celebrate their anniversary (and mother-in-law joining them)      Goals established today 3/18/24 by client:  Nutrition: 2 meals/day (eats breakfast a little later so eats 1 more time near dinner time) no snacking  Exercise/Movement: walking regularly  Other: working on their deck this summer  Other: vacation to [facility] end of June 2024      Resources Provided:   Handouts provided by FREDDIE Dickson:  [place] to success  Recipe resources      NOTES:    Works full-time with [employer] for 19 years in November   to  Jose De Jesus (works in automotive industry), been together for 22 years.  2 children: daughter 11 Lulu, son 7 Nakul (football and gymnastics)  Lives in [place], grew up in [place] (4th out of 5 siblings), both parents have passed  1 cat and 1 dog (just turned 1 yrs), just got another dog (mom of their other dog) in October which happens to be the mom of the dog they already had (October 2023)  Favorite season: summer and fall. Doesn't enjoy winter  Own a camper and usually do a summer trip out west ([place] or [place] for example)      Follow-up appointments:    4/24 with Dr. Tinoco follow up  4/29 with CINDY Kidd #11 final          Reminder, this is included in your 24-Week Healthy Lifestyle Plan:  *Healthy Lifestyle Group:  This is a 60 minute virtual coaching group for those who want to lead a healthier lifestyle. Come together to set goals and overcome barriers in a supportive group environment. We will address the four pillars of health: nutrition, exercise, sleep and emotional well-being.  This group is highly recommended for those who are participating in the 24 week Healthy Lifestyle Plan and our Health Coaching sessions.    When: This group meets the first Friday of the month, 12:30 PM - 1:30 PM online, via a ZOOM meeting.      Facilitator: Led by National Board-Certified Health and , Meri Haro,  "[facility].    To [place]: Please call the Call Center at [phone] to register. You will get an appointment to attend in your [facility] appointments.  Fifteen minutes prior to the meeting, complete the e-check in and you will get the link to join the meeting. There is no charge to attend this group. Space is limited.    2024: Let's Talk ~ Open Discussion to Share Wins and Challenges  May 3: \"Essentialism: The Disciplined Pursuit of Less\" by Ishan Malhotra (Book Bites: guided discussion on bite-sized nuggets of wisdom from favorite wellness books. No need to read the book; though highly encouraged if you are interested)  : Let's Talk  JULY - NO MEETING - off for the  Holiday  August 2: \"The Blue Zones: Secrets for Living a Longer Life\" by Eliel Belle (Book Bites: guided discussion on bite-sized nuggets of wisdom from favorite wellness books. No need to read the book; though highly encouraged if you are interested)  September thru December TBD       SIGNATURE:  FAMILIA Zamora, [facility]  National Board-Certified Health &   24-Week Healthy Lifestyle Plan Health   [facility]  email:  [email or web address]  appointment [phone]      Again, thank you for allowing me to participate in the care of your patient.        Sincerely,        Marti Gallagher  "

## 2024-03-18 NOTE — PROGRESS NOTES
Reason for Visit: 24-Week Healthy Lifestyle Plan Follow up Visit - Health Coaching Virtual Visit    Progress Notes:  Return 24-Week Healthy Lifestyle Plan Weight Management Health Coaching Note - Virtual Visit  Shirlene Rodriguez   MRN: [id number]  : 1980  FELI: 2024      Health  Follow-up Visit #: 10  Provider: [facility]MANI [facility]  Location: off-site/home office  Start time: 2:00 pm  End time: 2:25 pm      Assessment:  Initial Start Date: 2023  Graduation Date: 2024 (final visit scheduled for 2024)  Alex (online resource) enrollment date(s): 2023  Physician:  Dr. Tinoco  Referred by: self  Initial Weight (lbs): 192 lbs.  Current Weight (lbs): did not report today: was previously 181.3 lbs. (typically weighs a couple x/week)  Average Daily Water (ounces): - oz/day  Weight Loss Medication: Contrave (just started and helping with hunger)  Nutrition Plan: real whole foods        Conversation today 3/18/24:  Overall feeling good.   Nutrition: eating better  Exercise/Movement/Activity: walking the dogs on the nice days  Sleep: sleeping better  Other: have more energy than previously. Sun shining, feels so good!      Previous Goal(s) review:  Goals established 24 by client:  Consistency  Nutrition: continuing with being mindful and choosing healthy options. (Hasn't been snacking) Listening to her body  Planning on taking protein shakes with on their trip to [place] (for gymnastics). Healthy snacks. - ended up not going as daughter wasn't feeling 100%.  Other: end of 2024: spending a week in [place] to celebrate their anniversary        Goals established 24 by client:  Nutrition: 3 meals/day. No snacking and feels good thru the day.  Other: 2024: spending a week in [place] to celebrate their anniversary (and mother-in-law joining them)      Goals established today 3/18/24 by client:  Nutrition: 2 meals/day (eats breakfast a little later  so eats 1 more time near dinner time) no snacking  Exercise/Movement: walking regularly  Other: working on their deck this summer  Other: vacation to [facility] end of June 2024      Resources Provided:   Handouts provided by FREDDIE Dickson:  [place] to success  Recipe resources      NOTES:    Works full-time with [employer] for 19 years in November   to  Jose De Jesus (works in automotive industry), been together for 22 years.  2 children: daughter 11 Lulu, son 7 Nakul (football and gymnastics)  Lives in [place], grew up in [place] (4th out of 5 siblings), both parents have passed  1 cat and 1 dog (just turned 1 yrs), just got another dog (mom of their other dog) in October which happens to be the mom of the dog they already had (October 2023)  Favorite season: summer and fall. Doesn't enjoy winter  Own a camper and usually do a summer trip out west ([place] or [place] for example)      Follow-up appointments:    4/24 with Dr. Tinoco follow up  4/29 with CINDY Kidd #11 final          Reminder, this is included in your 24-Week Healthy Lifestyle Plan:  *Healthy Lifestyle Group:  This is a 60 minute virtual coaching group for those who want to lead a healthier lifestyle. Come together to set goals and overcome barriers in a supportive group environment. We will address the four pillars of health: nutrition, exercise, sleep and emotional well-being.  This group is highly recommended for those who are participating in the 24 week Healthy Lifestyle Plan and our Health Coaching sessions.    When: This group meets the first Friday of the month, 12:30 PM - 1:30 PM online, via a ZOOM meeting.      Facilitator: Led by National Board-Certified Health and , Meri Haro [facility].    To [place]: Please call the Call Center at [phone] to register. You will get an appointment to attend in your [facility] upcoming appointments.  Fifteen minutes prior to the meeting, complete the e-check in and you will get the  "link to join the meeting. There is no charge to attend this group. Space is limited.    2024: Let's Talk ~ Open Discussion to Share Wins and Challenges  May 3: \"Essentialism: The Disciplined Pursuit of Less\" by Ishan Malhotra (Book Bites: guided discussion on bite-sized nuggets of wisdom from favorite wellness books. No need to read the book; though highly encouraged if you are interested)  : Let's Talk  JULY - NO MEETING - off for the  Holiday  August 2: \"The Blue Zones: Secrets for Living a Longer Life\" by Eliel Belle (Book Bites: guided discussion on bite-sized nuggets of wisdom from favorite wellness books. No need to read the book; though highly encouraged if you are interested)  September thru December TBD       SIGNATURE:  FAMILIA Zamora, [facility]  National Board-Certified Health &   24-Week Healthy Lifestyle Plan Health   [place] [facility]  email:  [email or web address]  appointment [phone]  "

## 2024-04-02 DIAGNOSIS — E66.811 CLASS 1 OBESITY WITH SERIOUS COMORBIDITY AND BODY MASS INDEX (BMI) OF 32.0 TO 32.9 IN ADULT, UNSPECIFIED OBESITY TYPE: ICD-10-CM

## 2024-04-02 RX ORDER — NALTREXONE HYDROCHLORIDE AND BUPROPION HYDROCHLORIDE 8; 90 MG/1; MG/1
2 TABLET, EXTENDED RELEASE ORAL 2 TIMES DAILY
Qty: 360 TABLET | Refills: 0 | Status: SHIPPED | OUTPATIENT
Start: 2024-04-02 | End: 2024-07-03

## 2024-04-15 ENCOUNTER — PATIENT OUTREACH (OUTPATIENT)
Dept: CARE COORDINATION | Facility: CLINIC | Age: 44
End: 2024-04-15
Payer: COMMERCIAL

## 2024-04-28 DIAGNOSIS — F41.9 ANXIETY: ICD-10-CM

## 2024-04-28 DIAGNOSIS — F41.1 GAD (GENERALIZED ANXIETY DISORDER): ICD-10-CM

## 2024-04-29 ENCOUNTER — VIRTUAL VISIT (OUTPATIENT)
Dept: SURGERY | Facility: CLINIC | Age: 44
End: 2024-04-29

## 2024-04-29 ENCOUNTER — PATIENT OUTREACH (OUTPATIENT)
Dept: CARE COORDINATION | Facility: CLINIC | Age: 44
End: 2024-04-29

## 2024-04-29 DIAGNOSIS — E66.9 OBESITY: Primary | ICD-10-CM

## 2024-04-29 PROCEDURE — 99207 PR MWM HEALTH COACH NO CHARGE: CPT | Mod: 93

## 2024-04-29 RX ORDER — VENLAFAXINE HYDROCHLORIDE 225 MG/1
225 TABLET, EXTENDED RELEASE ORAL DAILY
Qty: 90 TABLET | Refills: 0 | Status: SHIPPED | OUTPATIENT
Start: 2024-04-29 | End: 2024-07-03 | Stop reason: DRUGHIGH

## 2024-04-29 RX ORDER — BUSPIRONE HYDROCHLORIDE 30 MG/1
30 TABLET ORAL DAILY
Qty: 90 TABLET | Refills: 0 | Status: SHIPPED | OUTPATIENT
Start: 2024-04-29 | End: 2024-07-03

## 2024-04-29 NOTE — PROGRESS NOTES
Reason for Visit: 24-Week Healthy Lifestyle Plan Follow up Visit - Health Coaching Virtual Visit    Progress Notes:  Return 24-Week Healthy Lifestyle Plan Weight Management Health Coaching Note - Virtual Visit  Shirlene Rodriguez   MRN: [id number]  : 1980  FELI: 2024    Health  Follow-up Visit #: 11 final  [facility]  Location: off-site/home office  Start time: 2:03 pm  End time: 2:22 pm    Assessment:  Initial Start Date: 2023  Graduation Date: 2024 (final visit scheduled for 2024)  Alex (online resource) enrollment date(s): 2023  Physician:  Dr. Tinoco  Referred by: self  Initial Weight (lbs): 192 lbs.  Current Weight (lbs): did not report today: was previously 181.3 lbs. (typically weighs a couple x/week)  Average Daily Water (ounces): - oz/day  Weight Loss Medication: Contrave (just started and helping with hunger)  Nutrition Plan: real whole foods       Conversation today :  Nutrition: met with a Holistic Provider. Eating differently, using herbs and spices. Excited. Mindset is good, putting good things into her body. Learning   Other: looking at root cause of thyroid, anxiety and using food to nourish self. Working with a new Ayurvedic Provider near her.  Other: physically feeling good, more energy - even though hasn't had that for a long time. Only snoozed 1x/today.  Other: emotionally feeling better, less irritation.  Pause and choose response.       Previous Goal(s) review:  Goals established 24 by client:  Consistency  Nutrition: continuing with being mindful and choosing healthy options. (Hasn't been snacking) Listening to her body  Planning on taking protein shakes with on their trip to [place] (for gymnastics). Healthy snacks. - ended up not going as daughter wasn't feeling 100%.  Other: end of 2024: spending a week in [place] to celebrate their anniversary        Goals established 24 by  client:  Nutrition: 3 meals/day. No snacking and feels good thru the day.  Other: June 2024: spending a week in [place] to celebrate their anniversary (and mother-in-law joining them)        Goals established 3/18/24 by client:  Nutrition: 2 meals/day (eats breakfast a little later so eats 1 more time near dinner time) no snacking  Exercise/Movement: walking regularly  Other: working on their deck this summer  Other: vacation to [facility] end of June 2024       GOALS established today 4/29 by client:  Nutrition: following Ayurvedic ways to eating, working with a new practioner meeting next May   Exercise/Movement: looking forward to going for walks with warmer weather/season. Feeling more motivated for day to day things.    Resources Provided:   Handouts provided by FREDDIE Dickson:  [place] to success  Recipe resources        NOTES:    Works full-time with [employer] for 19 years in November   to  Jose De Jesus (works in automotive industry), been together for 22 years.  2 children: daughter 11 Lulu, son 7 Nakul (football and gymnastics)  Lives in [place], grew up in [place] (4th out of 5 siblings), both parents have passed  1 cat and 1 dog (just turned 1 yrs), just got another dog (mom of their other dog) in October which happens to be the mom of the dog they already had (October 2023)  Favorite season: summer and fall. Doesn't enjoy winter  Own a camper and usually do a summer trip out west ([place] or [place] for example)      Follow-up appointments:  will reach out if needing anything moving forward.      Reminder, this is included in your 24-Week Healthy Lifestyle Plan:  *Healthy Lifestyle Group:  This is a 60 minute virtual coaching group for those who want to lead a healthier lifestyle. Come together to set goals and overcome barriers in a supportive group environment. We will address the four pillars of health: nutrition, exercise, sleep and emotional well-being.  This group is highly recommended for  "those who are participating in the 24 week Healthy Lifestyle Plan and our Health Coaching sessions.    When: This group meets the first Friday of the month, 12:30 PM - 1:30 PM online, via a ZOOM meeting.      Facilitator: Led by National Board-Certified Health and , Meri Haro, [facility].    To [place]: Please call the Call Center at [phone] to register. You will get an appointment to attend in your [facility] upcoming appointments.  Fifteen minutes prior to the meeting, complete the e-check in and you will get the link to join the meeting. There is no charge to attend this group. Space is limited.     Dates  May 3: \"Essentialism: The Disciplined Pursuit of Less\" by Ishan Malhotra (Book Bites: guided discussion on bite-sized nuggets of wisdom from favorite wellness books. No need to read the book; though highly encouraged if you are interested)  : Let's Talk  JULY - NO MEETING - off for the  Holiday  August 2: \"The Blue Zones: Secrets for Living a Longer Life\" by Eliel Belle (Book Bites: guided discussion on bite-sized nuggets of wisdom from favorite wellness books. No need to read the book; though highly encouraged if you are interested)  September thru December TBD       SIGNATURE:  FAMILIA Zamora, [facility]  National Board-Certified Health &   24-Week Healthy Lifestyle Plan Health   [place] [facility]  email:  [email or web address]  appointment [phone]  "

## 2024-04-29 NOTE — LETTER
2024         RE: Shirlene Rodriguez  [address] [place] [address]        Dear Colleague,    Thank you for referring your patient, Shirlene Rodriguez, to the [facility] SUR[facility] AND BARIATRICS CARE [place]. Please see a copy of my visit note below.    Reason for Visit: 24-Week Healthy Lifestyle Plan Follow up Visit - Health Coaching Virtual Visit    Progress Notes:  Return 24-Week Healthy Lifestyle Plan Weight Management Health Coaching Note - Virtual Visit  Shirlene Rodriguez   MRN: [id number]  : 1980  FELI: 2024    Health  Follow-up Visit #: 11 final  [facility]  Location: off-site/home office  Start time: 2:03 pm  End time: 2:22 pm    Assessment:  Initial Start Date: 2023  Graduation Date: 2024 (final visit scheduled for 2024)  Alex (online resource) enrollment date(s): 2023  Physician:  Dr. Tinoco  Referred by: self  Initial Weight (lbs): 192 lbs.  Current Weight (lbs): did not report today: was previously 181.3 lbs. (typically weighs a couple x/week)  Average Daily Water (ounces): - oz/day  Weight Loss Medication: Contrave (just started and helping with hunger)  Nutrition Plan: real whole foods       Conversation today :  Nutrition: met with a Holistic Provider. Eating differently, using herbs and spices. Excited. Mindset is good, putting good things into her body. Learning   Other: looking at root cause of thyroid, anxiety and using food to nourish self. Working with a new Ayurvedic Provider near her.  Other: physically feeling good, more energy - even though hasn't had that for a long time. Only snoozed 1x/today.  Other: emotionally feeling better, less irritation.  Pause and choose response.       Previous Goal(s) review:  Goals established 24 by client:  Consistency  Nutrition: continuing with being mindful and choosing healthy options. (Hasn't been snacking) Listening to her body  Planning  on taking protein shakes with on their trip to [place] (for gymnastics). Healthy snacks. - ended up not going as daughter wasn't feeling 100%.  Other: end of June 2024: spending a week in [place] to celebrate their anniversary        Goals established 2/19/24 by client:  Nutrition: 3 meals/day. No snacking and feels good thru the day.  Other: June 2024: spending a week in [place] to celebrate their anniversary (and mother-in-law joining them)        Goals established 3/18/24 by client:  Nutrition: 2 meals/day (eats breakfast a little later so eats 1 more time near dinner time) no snacking  Exercise/Movement: walking regularly  Other: working on their deck this summer  Other: vacation to [facility] end of June 2024       GOALS established today 4/29 by client:  Nutrition: following Ayurvedic ways to eating, working with a new practioner meeting next May   Exercise/Movement: looking forward to going for walks with warmer weather/season. Feeling more motivated for day to day things.    Resources Provided:   Handouts provided by FREDDIE Dickson:  [place] to success  Recipe resources        NOTES:    Works full-time with [employer] for 19 years in November   to  Jose De Jesus (works in automotive industry), been together for 22 years.  2 children: daughter 11 Lulu, son 7 Nakul (football and gymnastics)  Lives in [place], grew up in [place] (4th out of 5 siblings), both parents have passed  1 cat and 1 dog (just turned 1 yrs), just got another dog (mom of their other dog) in October which happens to be the mom of the dog they already had (October 2023)  Favorite season: summer and fall. Doesn't enjoy winter  Own a camper and usually do a summer trip out west ([place] or [place] for example)      Follow-up appointments:  will reach out if needing anything moving forward.      Reminder, this is included in your 24-Week Healthy Lifestyle Plan:  *Healthy Lifestyle Group:  This is a 60 minute virtual coaching group for  "those who want to lead a healthier lifestyle. Come together to set goals and overcome barriers in a supportive group environment. We will address the four pillars of health: nutrition, exercise, sleep and emotional well-being.  This group is highly recommended for those who are participating in the 24 week Healthy Lifestyle Plan and our Health Coaching sessions.    When: This group meets the first Friday of the month, 12:30 PM - 1:30 PM online, via a ZOOM meeting.      Facilitator: Led by National Board-Certified Health and , Meri Haro [facility].    To [place]: Please call the Call Center at [phone] to register. You will get an appointment to attend in your [facility] upcoming appointments.  Fifteen minutes prior to the meeting, complete the e-check in and you will get the link to join the meeting. There is no charge to attend this group. Space is limited.    2024 Dates  May 3: \"Essentialism: The Disciplined Pursuit of Less\" by Ishan Malhotra (Book Bites: guided discussion on bite-sized nuggets of wisdom from favorite wellness books. No need to read the book; though highly encouraged if you are interested)  : Let's Talk  JULY - NO MEETING - off for the  Holiday  August 2: \"The Blue Zones: Secrets for Living a Longer Life\" by Eliel Belle (Book Bites: guided discussion on bite-sized nuggets of wisdom from favorite wellness books. No need to read the book; though highly encouraged if you are interested)  September thru December TBD       SIGNATURE:  FAMILIA Zamora, [facility]  National Board-Certified Health &   24-Week Healthy Lifestyle Plan Health   [facility] Weight Management Program  email:  [email or web address]  appointment [phone]      Again, thank you for allowing me to participate in the care of your patient.        Sincerely,        Marti Gallagher  "

## 2024-05-15 ENCOUNTER — PATIENT OUTREACH (OUTPATIENT)
Dept: CARE COORDINATION | Facility: CLINIC | Age: 44
End: 2024-05-15
Payer: COMMERCIAL

## 2024-05-26 DIAGNOSIS — E03.9 ACQUIRED HYPOTHYROIDISM: ICD-10-CM

## 2024-05-28 RX ORDER — LEVOTHYROXINE SODIUM 112 UG/1
112 TABLET ORAL DAILY
Qty: 90 TABLET | Refills: 0 | Status: SHIPPED | OUTPATIENT
Start: 2024-05-28 | End: 2024-07-03

## 2024-06-12 ENCOUNTER — PATIENT OUTREACH (OUTPATIENT)
Dept: CARE COORDINATION | Facility: CLINIC | Age: 44
End: 2024-06-12
Payer: COMMERCIAL

## 2024-06-22 ENCOUNTER — HEALTH MAINTENANCE LETTER (OUTPATIENT)
Age: 44
End: 2024-06-22

## 2024-07-03 ENCOUNTER — OFFICE VISIT (OUTPATIENT)
Dept: FAMILY MEDICINE | Facility: CLINIC | Age: 44
End: 2024-07-03
Payer: COMMERCIAL

## 2024-07-03 VITALS
BODY MASS INDEX: 32.36 KG/M2 | RESPIRATION RATE: 16 BRPM | DIASTOLIC BLOOD PRESSURE: 82 MMHG | HEART RATE: 99 BPM | SYSTOLIC BLOOD PRESSURE: 121 MMHG | TEMPERATURE: 98.3 F | OXYGEN SATURATION: 99 % | WEIGHT: 194.2 LBS | HEIGHT: 65 IN

## 2024-07-03 DIAGNOSIS — Z12.31 VISIT FOR SCREENING MAMMOGRAM: ICD-10-CM

## 2024-07-03 DIAGNOSIS — Z13.1 SCREENING FOR DIABETES MELLITUS: ICD-10-CM

## 2024-07-03 DIAGNOSIS — K76.0 NAFLD (NONALCOHOLIC FATTY LIVER DISEASE): ICD-10-CM

## 2024-07-03 DIAGNOSIS — Z13.220 SCREENING FOR LIPOID DISORDERS: ICD-10-CM

## 2024-07-03 DIAGNOSIS — E03.9 ACQUIRED HYPOTHYROIDISM: ICD-10-CM

## 2024-07-03 DIAGNOSIS — Z00.00 ROUTINE GENERAL MEDICAL EXAMINATION AT A HEALTH CARE FACILITY: Primary | ICD-10-CM

## 2024-07-03 DIAGNOSIS — E66.811 CLASS 1 OBESITY WITH SERIOUS COMORBIDITY AND BODY MASS INDEX (BMI) OF 31.0 TO 31.9 IN ADULT, UNSPECIFIED OBESITY TYPE: ICD-10-CM

## 2024-07-03 DIAGNOSIS — R79.89: ICD-10-CM

## 2024-07-03 DIAGNOSIS — F41.1 GAD (GENERALIZED ANXIETY DISORDER): ICD-10-CM

## 2024-07-03 DIAGNOSIS — N95.1 PERIMENOPAUSAL: ICD-10-CM

## 2024-07-03 LAB
ALBUMIN SERPL BCG-MCNC: 4.6 G/DL (ref 3.5–5.2)
ALP SERPL-CCNC: 97 U/L (ref 40–150)
ALT SERPL W P-5'-P-CCNC: 76 U/L (ref 0–50)
ANION GAP SERPL CALCULATED.3IONS-SCNC: 12 MMOL/L (ref 7–15)
AST SERPL W P-5'-P-CCNC: 36 U/L (ref 0–45)
BILIRUB SERPL-MCNC: 0.3 MG/DL
BUN SERPL-MCNC: 9.6 MG/DL (ref 6–20)
CALCIUM SERPL-MCNC: 9.7 MG/DL (ref 8.6–10)
CHLORIDE SERPL-SCNC: 101 MMOL/L (ref 98–107)
CHOLEST SERPL-MCNC: 215 MG/DL
CREAT SERPL-MCNC: 0.71 MG/DL (ref 0.51–0.95)
DEPRECATED HCO3 PLAS-SCNC: 27 MMOL/L (ref 22–29)
EGFRCR SERPLBLD CKD-EPI 2021: >90 ML/MIN/1.73M2
FASTING STATUS PATIENT QL REPORTED: NO
FASTING STATUS PATIENT QL REPORTED: NO
GLUCOSE SERPL-MCNC: 93 MG/DL (ref 70–99)
HBA1C MFR BLD: 5.7 % (ref 0–5.6)
HDLC SERPL-MCNC: 85 MG/DL
LDLC SERPL CALC-MCNC: 91 MG/DL
NONHDLC SERPL-MCNC: 130 MG/DL
POTASSIUM SERPL-SCNC: 4.2 MMOL/L (ref 3.4–5.3)
PROT SERPL-MCNC: 7.4 G/DL (ref 6.4–8.3)
SODIUM SERPL-SCNC: 140 MMOL/L (ref 135–145)
T4 FREE SERPL-MCNC: 1.39 NG/DL (ref 0.9–1.7)
TRIGL SERPL-MCNC: 194 MG/DL
TSH SERPL DL<=0.005 MIU/L-ACNC: 0.27 UIU/ML (ref 0.3–4.2)

## 2024-07-03 PROCEDURE — 84702 CHORIONIC GONADOTROPIN TEST: CPT | Performed by: PHYSICIAN ASSISTANT

## 2024-07-03 PROCEDURE — 84443 ASSAY THYROID STIM HORMONE: CPT | Performed by: PHYSICIAN ASSISTANT

## 2024-07-03 PROCEDURE — 84439 ASSAY OF FREE THYROXINE: CPT | Performed by: PHYSICIAN ASSISTANT

## 2024-07-03 PROCEDURE — 99213 OFFICE O/P EST LOW 20 MIN: CPT | Mod: 25 | Performed by: PHYSICIAN ASSISTANT

## 2024-07-03 PROCEDURE — 99396 PREV VISIT EST AGE 40-64: CPT | Performed by: PHYSICIAN ASSISTANT

## 2024-07-03 PROCEDURE — 86800 THYROGLOBULIN ANTIBODY: CPT | Performed by: PHYSICIAN ASSISTANT

## 2024-07-03 PROCEDURE — 80061 LIPID PANEL: CPT | Performed by: PHYSICIAN ASSISTANT

## 2024-07-03 PROCEDURE — 84146 ASSAY OF PROLACTIN: CPT | Performed by: PHYSICIAN ASSISTANT

## 2024-07-03 PROCEDURE — 80053 COMPREHEN METABOLIC PANEL: CPT | Performed by: PHYSICIAN ASSISTANT

## 2024-07-03 PROCEDURE — 36415 COLL VENOUS BLD VENIPUNCTURE: CPT | Performed by: PHYSICIAN ASSISTANT

## 2024-07-03 PROCEDURE — 84481 FREE ASSAY (FT-3): CPT | Performed by: PHYSICIAN ASSISTANT

## 2024-07-03 PROCEDURE — 86376 MICROSOMAL ANTIBODY EACH: CPT | Performed by: PHYSICIAN ASSISTANT

## 2024-07-03 PROCEDURE — 83001 ASSAY OF GONADOTROPIN (FSH): CPT | Performed by: PHYSICIAN ASSISTANT

## 2024-07-03 PROCEDURE — 83036 HEMOGLOBIN GLYCOSYLATED A1C: CPT | Performed by: PHYSICIAN ASSISTANT

## 2024-07-03 RX ORDER — LEVOTHYROXINE SODIUM 112 UG/1
112 TABLET ORAL DAILY
Qty: 90 TABLET | Refills: 3 | Status: SHIPPED | OUTPATIENT
Start: 2024-07-03 | End: 2024-07-11

## 2024-07-03 RX ORDER — VENLAFAXINE HYDROCHLORIDE 225 MG/1
225 TABLET, EXTENDED RELEASE ORAL DAILY
Qty: 90 TABLET | Refills: 0 | Status: CANCELLED | OUTPATIENT
Start: 2024-07-03

## 2024-07-03 RX ORDER — VENLAFAXINE HYDROCHLORIDE 37.5 MG/1
CAPSULE, EXTENDED RELEASE ORAL
Qty: 210 CAPSULE | Refills: 0 | Status: SHIPPED | OUTPATIENT
Start: 2024-07-03 | End: 2024-09-09

## 2024-07-03 SDOH — HEALTH STABILITY: PHYSICAL HEALTH: ON AVERAGE, HOW MANY MINUTES DO YOU ENGAGE IN EXERCISE AT THIS LEVEL?: 30 MIN

## 2024-07-03 SDOH — HEALTH STABILITY: PHYSICAL HEALTH: ON AVERAGE, HOW MANY DAYS PER WEEK DO YOU ENGAGE IN MODERATE TO STRENUOUS EXERCISE (LIKE A BRISK WALK)?: 3 DAYS

## 2024-07-03 ASSESSMENT — SOCIAL DETERMINANTS OF HEALTH (SDOH): HOW OFTEN DO YOU GET TOGETHER WITH FRIENDS OR RELATIVES?: TWICE A WEEK

## 2024-07-03 NOTE — PATIENT INSTRUCTIONS
The Diet Fix by Luis Fernando Doughertyo by Nina Patino    Patient Education   Preventive Care Advice   This is general advice given by our system to help you stay healthy. However, your care team may have specific advice just for you. Please talk to your care team about your preventive care needs.  Nutrition  Eat 5 or more servings of fruits and vegetables each day.  Try wheat bread, brown rice and whole grain pasta (instead of white bread, rice, and pasta).  Get enough calcium and vitamin D. Check the label on foods and aim for 100% of the RDA (recommended daily allowance).  Lifestyle  Exercise at least 150 minutes each week  (30 minutes a day, 5 days a week).  Do muscle strengthening activities 2 days a week. These help control your weight and prevent disease.  No smoking.  Wear sunscreen to prevent skin cancer.  Have a dental exam and cleaning every 6 months.  Yearly exams  See your health care team every year to talk about:  Any changes in your health.  Any medicines your care team has prescribed.  Preventive care, family planning, and ways to prevent chronic diseases.  Shots (vaccines)   HPV shots (up to age 26), if you've never had them before.  Hepatitis B shots (up to age 59), if you've never had them before.  COVID-19 shot: Get this shot when it's due.  Flu shot: Get a flu shot every year.  Tetanus shot: Get a tetanus shot every 10 years.  Pneumococcal, hepatitis A, and RSV shots: Ask your care team if you need these based on your risk.  Shingles shot (for age 50 and up)  General health tests  Diabetes screening:  Starting at age 35, Get screened for diabetes at least every 3 years.  If you are younger than age 35, ask your care team if you should be screened for diabetes.  Cholesterol test: At age 39, start having a cholesterol test every 5 years, or more often if advised.  Bone density scan (DEXA): At age 50, ask your care team if you should have this scan for osteoporosis (brittle bones).  Hepatitis  C: Get tested at least once in your life.  STIs (sexually transmitted infections)  Before age 24: Ask your care team if you should be screened for STIs.  After age 24: Get screened for STIs if you're at risk. You are at risk for STIs (including HIV) if:  You are sexually active with more than one person.  You don't use condoms every time.  You or a partner was diagnosed with a sexually transmitted infection.  If you are at risk for HIV, ask about PrEP medicine to prevent HIV.  Get tested for HIV at least once in your life, whether you are at risk for HIV or not.  Cancer screening tests  Cervical cancer screening: If you have a cervix, begin getting regular cervical cancer screening tests starting at age 21.  Breast cancer scan (mammogram): If you've ever had breasts, begin having regular mammograms starting at age 40. This is a scan to check for breast cancer.  Colon cancer screening: It is important to start screening for colon cancer at age 45.  Have a colonoscopy test every 10 years (or more often if you're at risk) Or, ask your provider about stool tests like a FIT test every year or Cologuard test every 3 years.  To learn more about your testing options, visit:   .  For help making a decision, visit:   https://bit.ly/uz77493.  Prostate cancer screening test: If you have a prostate, ask your care team if a prostate cancer screening test (PSA) at age 55 is right for you.  Lung cancer screening: If you are a current or former smoker ages 50 to 80, ask your care team if ongoing lung cancer screenings are right for you.  For informational purposes only. Not to replace the advice of your health care provider. Copyright   2023 [place] [employer]. All rights reserved. Clinically reviewed by the [facility] Transitions Program. Btarget 057199 - REV 01/24.

## 2024-07-03 NOTE — PROGRESS NOTES
Preventive Care Visit  [facility] BASIA Zambrano PA-C, Family Medicine  Jul 3, 2024      Assessment & Plan     Routine general medical examination at a health care facility  Declines Covid, hepatitis B vaccine today.     Acquired hypothyroidism  Since last visit, patient describes the following symptoms: Weight stable, no hair loss, no skin changes, no constipation, no loose stools  + She has been eating better but still not losing weight. Would like the additional thyroid labs tested, see below. Discussed brain/thyroid connection basis behind testing TSH with reflex typically.    - TSH; Future  - T4, free; Future  - T3, Free; Future  - Thyroid peroxidase antibody; Future  - Anti thyroglobulin antibody; Future  - TSH  - T4, free  - T3, Free  - Thyroid peroxidase antibody  - Anti thyroglobulin antibody    VANESSA (generalized anxiety disorder)  Anxiety had been worsening and dealing with mother's death in 2021. Feels she has overall been doing better and would like to try to get off medications.    She weaned herself off the buspar. She would like to wean off effexor; discussed discontinuation side effects, monitoring mental health effects, and can stay at lower dose if she would like.  - venlafaxine (EFFEXOR XR) 37.5 MG 24 hr capsule; Take 5 capsules (187.5 mg) by mouth daily for 14 days, THEN 4 capsules (150 mg) daily for 14 days, THEN 3 capsules (112.5 mg) daily for 14 days, THEN 2 capsules (75 mg) daily for 14 days, THEN 1 capsule (37.5 mg) daily for 14 days.    Perimenopausal  No periods with IUD. Has had hot flashes for a while. Sister went through menopause in her late 40s, unsure about mother. Would like hormones checked.  - Follicle stimulating hormone; Future  - Follicle stimulating hormone    NAFLD (nonalcoholic fatty liver disease)  Confirmed on ultrasound. Monitor liver function test. Lifestyle management.  - Comprehensive metabolic panel (BMP + Alb, Alk Phos, ALT, AST, Total. Bili,  "TP); Future  - Comprehensive metabolic panel (BMP + Alb, Alk Phos, ALT, AST, Total. Bili, TP)    Class 1 obesity with serious comorbidity and body mass index (BMI) of 31.0 to 31.9 in adult, unspecified obesity type  She did meet with weight management clinic. Trial of contrave; did not like how it made her feel. Is aware of other medications but is not interested in adding on more medications at this time.    Screening for lipoid disorders  - Lipid panel reflex to direct LDL Non-fasting; Future  - Lipid panel reflex to direct LDL Non-fasting    Screening for diabetes mellitus  - Hemoglobin A1c; Future  - Hemoglobin A1c    Visit for screening mammogram  - MA Screening Bilateral w/ Shiv; Future      BMI  Estimated body mass index is 32.67 kg/m  as calculated from the following:    Height as of this encounter: 1.642 m (5' 4.65\").    Weight as of this encounter: 88.1 kg (194 lb 3.2 oz).   Weight management plan: Discussed healthy diet and exercise guidelines    Counseling  Appropriate preventive services were discussed with this patient, including applicable screening as appropriate for fall prevention, nutrition, physical activity, Tobacco-use cessation, weight loss and cognition.  Checklist reviewing preventive services available has been given to the patient.  Reviewed patient's diet, addressing concerns and/or questions.   She is at risk for lack of exercise and has been provided with information to increase physical activity for the benefit of her well-being.     Molly Garber is a 44 year old, presenting for the following:  Physical        7/3/2024     1:22 PM   Additional Questions   Roomed by Shweta   Accompanied by Self        Health Care Directive  Patient does not have a Health Care Directive or Living Will: Discussed advance care planning with patient; information given to patient to review.    HPI              7/3/2024   General Health   How would you rate your overall physical health? Good   Feel " stress (tense, anxious, or unable to sleep) Not at all            7/3/2024   Nutrition   Three or more servings of calcium each day? Yes   Diet: Regular (no restrictions)   How many servings of fruit and vegetables per day? (!) 2-3   How many sweetened beverages each day? 0-1            7/3/2024   Exercise   Days per week of moderate/strenous exercise 3 days   Average minutes spent exercising at this level 30 min            7/3/2024   Social Factors   Frequency of gathering with friends or relatives Twice a week   Worry food won't last until get money to buy more No   Food not last or not have enough money for food? No   Do you have housing? (Housing is defined as stable permanent housing and does not include staying ouside in a car, in a tent, in an abandoned building, in an overnight shelter, or couch-surfing.) Yes   Are you worried about losing your housing? No   Lack of transportation? No   Unable to get utilities (heat,electricity)? No            7/3/2024   Dental   Dentist two times every year? Yes            7/3/2024   TB Screening   Were you born outside of the [place]? No              Today's PHQ-2 Score:       2/23/2024     1:26 PM   PHQ-2 ( 1999 Pfizer)   Q1: Little interest or pleasure in doing things 0   Q2: Feeling down, depressed or hopeless 0   PHQ-2 Score 0   Q1: Little interest or pleasure in doing things Not at all   Q2: Feeling down, depressed or hopeless Not at all   PHQ-2 Score 0         7/3/2024   Substance Use   Alcohol more than 3/day or more than 7/wk No   Do you use any other substances recreationally? No        Social History     Tobacco Use    Smoking status: Never    Smokeless tobacco: Never   Vaping Use    Vaping status: Never Used   Substance Use Topics    Alcohol use: Yes     Comment: rare    Drug use: No           6/14/2022   LAST FHS-7 RESULTS   1st degree relative breast or ovarian cancer No   Any relative bilateral breast cancer No   Any male have breast cancer No   Any ONE woman have  BOTH breast AND ovarian cancer No   Any woman with breast cancer before 50yrs No   2 or more relatives with breast AND/OR ovarian cancer No   2 or more relatives with breast AND/OR bowel cancer No           Mammogram Screening - Mammogram every 1-2 years updated in Health Maintenance based on mutual decision making        7/3/2024   STI Screening   New sexual partner(s) since last STI/HIV test? No        History of abnormal Pap smear: No - age 30- 64 PAP with HPV every 5 years recommended        Latest Ref Rng & Units 5/15/2023     2:49 PM 2018    12:57 PM 2018    12:00 PM   PAP / HPV   PAP  Negative for Intraepithelial Lesion or Malignancy (NILM)      PAP (Historical)   NIL     HPV 16 DNA Negative Negative   Negative    HPV 18 DNA Negative Negative   Negative    Other HR HPV Negative Negative   Negative      ASCVD Risk   The 10-year ASCVD risk score (Aurelia SALES, et al., 2019) is: 0.3%    Values used to calculate the score:      Age: 44 years      Sex: Female      Is Non- : No      Diabetic: No      Tobacco smoker: No      Systolic Blood Pressure: 121 mmHg      Is BP treated: No      HDL Cholesterol: 83 mg/dL      Total Cholesterol: 199 mg/dL       Reviewed and updated as needed this visit by Provider                    Past Medical History:   Diagnosis Date    Abnormal Pap smear of cervix 2011    Acne     Allergies     Anxiety state, unspecified 2005    ASCUS with positive high risk HPV cervical 2005    HPV 16 - high risk - colp negative    Depressive disorder 2012    Gallstones 2020    Added automatically from request for surgery [id number]    GERD (gastroesophageal reflux disease) 2009    History of  2012    Major depression in complete remission (H24) 2012    Uncomplicated asthma     Unspecified asthma(493.90)     Unspecified hypothyroidism 2005     Past Surgical History:   Procedure Laterality Date    CARDIAC  STRESS TST,COMPLETE  2009    Normal     SECTION  2012    Procedure:  SECTION;   SECTION;  Surgeon: Warner Ac MD;  Location[facility]     SECTION, TUBAL LIGATION, COMBINED N/A 2016    Procedure: COMBINED  SECTION, TUBAL LIGATION;  Surgeon: Warner Ac MD;  Location: [facility]    GYN SURGERY  2012    C section    LAPAROSCOPIC CHOLECYSTECTOMY N/A 10/06/2020    Procedure: laparoscopic cholecystectomy and removal of abdominal wall mass;  Surgeon: Dipak Cornelius MD;  Location: [facility] OR    SURGICAL HISTORY OF -   2004    root canal     Lab work is in process  Labs reviewed in EPIC  BP Readings from Last 3 Encounters:   24 121/82   24 112/76   23 128/85    Wt Readings from Last 3 Encounters:   24 88.1 kg (194 lb 3.2 oz)   24 85.6 kg (188 lb 12.8 oz)   23 87.2 kg (192 lb 4.8 oz)                  Patient Active Problem List   Diagnosis    VANESSA (generalized anxiety disorder)    ASCUS with positive high risk HPV cervical    Acne    Acquired hypothyroidism    mirena IUD 2020    NAFLD (nonalcoholic fatty liver disease)    Class 1 obesity with serious comorbidity and body mass index (BMI) of 31.0 to 31.9 in adult     Past Surgical History:   Procedure Laterality Date    CARDIAC STRESS TST,COMPLETE  2009    Normal     SECTION  2012    Procedure:  SECTION;   SECTION;  Surgeon: Warner Ac MD;  [facility]     SECTION, TUBAL LIGATION, COMBINED N/A 2016    Procedure: COMBINED  SECTION, TUBAL LIGATION;  Surgeon: Warner Ac MD;  [facility]: [facility]    GYN SURGERY  2012    C section    LAPAROSCOPIC CHOLECYSTECTOMY N/A 10/06/2020    Procedure: laparoscopic cholecystectomy and removal of abdominal wall mass;  Surgeon: Dipak Cornelius MD;  Location: [facility] OR    SURGICAL HISTORY OF -   2004    root canal       Social History     Tobacco Use  "   Smoking status: Never    Smokeless tobacco: Never   Substance Use Topics    Alcohol use: Yes     Comment: rare     Family History   Problem Relation Age of Onset    Alcohol/Drug Mother         alcoholic    Pulmonary Embolism Mother     Arthritis Mother     Lung Cancer Mother         with mets    Chronic Obstructive Pulmonary Disease Mother     Other Cancer Mother     Prostate Cancer Father 65        with mets to bones/lung    Snoring Sister     Kidney Disease Sister     Hypertension Sister     Myocardial Infarction Paternal Grandmother     Alzheimer Disease Paternal Grandfather     Breast Cancer No family hx of     Colon Cancer No family hx of     Thyroid Cancer No family hx of     Multiple endocrine neoplasia No family hx of              Review of Systems  CONSTITUTIONAL: NEGATIVE for fever, chills, change in weight  INTEGUMENTARY/SKIN: NEGATIVE for worrisome rashes, moles or lesions  EYES: NEGATIVE for vision changes or irritation  ENT/MOUTH: NEGATIVE for ear, mouth and throat problems  RESP: NEGATIVE for significant cough or SOB  BREAST: NEGATIVE for masses, tenderness or discharge  CV: NEGATIVE for chest pain, palpitations or peripheral edema  GI: NEGATIVE for nausea, abdominal pain, heartburn, or change in bowel habits  : NEGATIVE for frequency, dysuria, or hematuria  MUSCULOSKELETAL: NEGATIVE for significant arthralgias or myalgia  NEURO: NEGATIVE for weakness, dizziness or paresthesias  ENDOCRINE: NEGATIVE for temperature intolerance, skin/hair changes  HEME: NEGATIVE for bleeding problems  PSYCHIATRIC: NEGATIVE for changes in mood or affect     Objective    Exam  /82   Pulse 99   Temp 98.3  F (36.8  C) (Tympanic)   Resp 16   Ht 1.642 m (5' 4.65\")   Wt 88.1 kg (194 lb 3.2 oz)   SpO2 99%   BMI 32.67 kg/m     Estimated body mass index is 32.67 kg/m  as calculated from the following:    Height as of this encounter: 1.642 m (5' 4.65\").    Weight as of this encounter: 88.1 kg (194 lb 3.2 " oz).    Physical Exam  GENERAL: alert and no distress  EYES: Eyes grossly normal to inspection, PERRL and conjunctivae and sclerae normal  HENT: ear canals and TM's normal, nose and mouth without ulcers or lesions  NECK: no adenopathy, no asymmetry, masses, or scars  RESP: lungs clear to auscultation - no rales, rhonchi or wheezes  CV: regular rate and rhythm, normal S1 S2, no S3 or S4, no murmur, click or rub, no peripheral edema  ABDOMEN: soft, nontender, no hepatosplenomegaly, no masses and bowel sounds normal  MS: no gross musculoskeletal defects noted, no edema  SKIN: no suspicious lesions or rashes  NEURO: Normal strength and tone, mentation intact and speech normal  PSYCH: mentation appears normal, affect normal/bright        Signed Electronically by: Gwen Zambrano PA-C

## 2024-07-04 LAB
FSH SERPL IRP2-ACNC: 73.8 MIU/ML
T3FREE SERPL-MCNC: 3.2 PG/ML (ref 2–4.4)

## 2024-07-05 LAB
THYROGLOB AB SERPL IA-ACNC: <20 IU/ML
THYROPEROXIDASE AB SERPL-ACNC: <10 IU/ML

## 2024-07-08 LAB — HCG INTACT+B SERPL-ACNC: <1 MIU/ML

## 2024-07-09 LAB — PROLACTIN SERPL 3RD IS-MCNC: 16 NG/ML (ref 5–23)

## 2024-08-01 ENCOUNTER — OFFICE VISIT (OUTPATIENT)
Dept: DERMATOLOGY | Facility: CLINIC | Age: 44
End: 2024-08-01
Payer: COMMERCIAL

## 2024-08-01 DIAGNOSIS — L65.9 HYPOTRICHOSIS: ICD-10-CM

## 2024-08-01 DIAGNOSIS — Z87.898 HISTORY OF ATYPICAL NEVUS: ICD-10-CM

## 2024-08-01 DIAGNOSIS — L71.9 ROSACEA: ICD-10-CM

## 2024-08-01 DIAGNOSIS — D22.9 NEVUS: Primary | ICD-10-CM

## 2024-08-01 DIAGNOSIS — L82.1 SEBORRHEIC KERATOSIS: ICD-10-CM

## 2024-08-01 DIAGNOSIS — L81.4 LENTIGO: ICD-10-CM

## 2024-08-01 DIAGNOSIS — D18.01 ANGIOMA OF SKIN: ICD-10-CM

## 2024-08-01 DIAGNOSIS — L70.0 ACNE VULGARIS: ICD-10-CM

## 2024-08-01 PROCEDURE — 99213 OFFICE O/P EST LOW 20 MIN: CPT | Performed by: PHYSICIAN ASSISTANT

## 2024-08-01 RX ORDER — TRETINOIN 0.25 MG/G
CREAM TOPICAL
Qty: 45 G | Refills: 11 | Status: SHIPPED | OUTPATIENT
Start: 2024-08-01

## 2024-08-01 RX ORDER — BIMATOPROST 3 UG/ML
1 SOLUTION TOPICAL AT BEDTIME
Qty: 5 ML | Refills: 11 | Status: SHIPPED | OUTPATIENT
Start: 2024-08-01

## 2024-08-01 NOTE — LETTER
8/1/2024      Shirlene Rodriguez  [address] [place] [address]      Dear Colleague,    Thank you for referring your patient, Shilrene Rodriguez, to the [facility]. Please see a copy of my visit note below.    HPI:   Shirlene Rodriguez is a 44 year old female who presents for Full skin cancer screening   Last Skin Exam: 1 year ago      1st Baseline: No  Personal HX of Skin Cancer: no   Personal HX of Malignant Melanoma: no   Family HX of Skin Cancer / Malignant Melanoma: no  Personal HX of Atypical Moles:  Yes moderately atypical nevus 2018  Risk factors: regular sun exposure  New / Changing lesions: no  Social History: works at [employer]  On review of systems, there are no further skin complaints, patient is feeling otherwise well.  See patient intake sheet.  ROS of the following were done and are negative: Constitutional, Eyes, Ears, Nose,   Mouth, Throat, Cardiovascular, Respiratory, GI, Genitourinary, Musculoskeletal,   Psychiatric, Endocrine, Allergic/Immunologic.      PHYSICAL EXAM:   There were no vitals taken for this visit.  Skin exam performed as follows: Type 2 skin. Mood appropriate  Alert and Oriented X 3. Well developed, well nourished in no distress.  General appearance: Normal  Head including face: Normal  Eyes: conjunctiva and lids: Normal  Mouth: Lips, teeth, gums: Normal  Neck: Normal  Chest-breast/axillae: Normal  Back: Normal  Spleen and liver: Normal  Cardiovascular: Exam of peripheral vascular system by observation for swelling, varicosities, edema: Normal  Genitalia: groin, buttocks: Normal  Extremities: digits/nails (clubbing): Normal  Eccrine and Apocrine glands: Normal  Right upper extremity: Normal  Left upper extremity: Normal  Right lower extremity: Normal  Left lower extremity: Normal  Skin: Scalp and body hair: See below    Pt deferred exam of breasts, groin, buttocks: No    Other physical findings:  1. Multiple pigmented macules on extremities and trunk  2.  Multiple pigmented macules on face, trunk and extremities  3. Multiple vascular papules on trunk, arms and legs  4. Multiple scattered keratotic plaques  5. Background erythema; telangiectasias on the nose       Except as noted above, no other signs of skin cancer or melanoma.     ASSESSMENT/PLAN:   Benign Full skin cancer screening today. Patient with history of a moderately atypical nevus in 2018  Advised on monthly self exams and 1 year  Patient Education: Appropriate brochures given.    Multiple benign appearing nevi on arms, legs and trunk. Discussed ABCDEs of melanoma and sunscreen.   Multiple lentigos on arms, legs and trunk. Advised benign, no treatment needed.  Multiple scattered angiomas. Advised benign, no treatment needed.   Seborrheic keratosis on arms, legs and trunk. Advised benign, no treatment needed.  Hypotrichosis  --Continue Latisse every day   Acne Vulgaris, acne rosacea - doing well on tretinoin daily  --Continue tretinoin 0.025% cream QHS          Follow-up: yearly if doing well/PRN sooner    1.) Patient was asked about new and changing moles. YES  2.) Patient received a complete physical skin examination: YES  3.) Patient was counseled to perform a monthly self skin examination: YES  Scribed By: Sasha Hughes, MS, PAHueyC      Again, thank you for allowing me to participate in the care of your patient.        Sincerely,        Sasha Hughes PA-C

## 2024-08-01 NOTE — PATIENT INSTRUCTIONS
Refilling Latisse and Retin-A today.              Proper skin care from [facility]:    -Eliminate harsh soaps as they strip the natural oils from the skin, often resulting in dry itchy skin ( i.e. Dial, Zest, [place])  -Use mild soaps such as Cetaphil or Dove Sensitive Skin in the shower. You do not need to use soap on arms, legs, and trunk every time you shower unless visibly soiled.   -Avoid hot or cold showers.  -After showering, lightly dry off and apply moisturizing within 2-3 minutes. This will help trap moisture in the skin.   -Aggressive use of a moisturizer at least 1-2 times a day to the entire body (including -Vanicream, Cetaphil, Aquaphor or Cerave) and moisturize hands after every washing.  -We recommend using moisturizers that come in a tub that needs to be scooped out, not a pump. This has more of an oil base. It will hold moisture in your skin much better than a water base moisturizer. The above recommended are non-pore clogging.      Wear a sunscreen with at least SPF 30 on your face, ears, neck and V of the chest daily. Wear sunscreen on other areas of the body if those areas are exposed to the sun throughout the day. Sunscreens can contain physical and/or chemical blockers. Physical blockers are less likely to clog pores, these include zinc oxide and titanium dioxide. Reapply every two hour and after swimming.     Sunscreen examples: https://www.ewg.org/sunscreen/    UV radiation  UVA radiation remains constant throughout the day and throughout the year. It is a longer wavelength than UVB and therefore penetrates deeper into the skin leading to immediate and delayed tanning, photoaging, and skin cancer. 70-80% of UVA and UVB radiation occurs between the hours of 10am-2pm.  UVB radiation  UVB radiation causes the most harmful effects and is more significant during the summer months. However, snow and ice can reflect UVB radiation leading to skin damage during the winter months as  well. UVB radiation is responsible for tanning, burning, inflammation, delayed erythema (pinkness), pigmentation (brown spots), and skin cancer.     I recommend self monthly full body exams and yearly full body exams with a dermatology provider. If you develop a new or changing lesion please follow up for examination. Most skin cancers are pink and scaly or pink and pearly. However, we do see blue/brown/black skin cancers.  Consider the ABCDEs of melanoma when giving yourself your monthly full body exam ( don't forget the groin, buttocks, feet, toes, etc). A-asymmetry, B-borders, C-color, D-diameter, E-elevation or evolving. If you see any of these changes please follow up in clinic. If you cannot see your back I recommend purchasing a hand held mirror to use with a larger wall mirror.       Checking for Skin Cancer  You can find cancer early by checking your skin each month. There are 3 kinds of skin cancer. They are melanoma, basal cell carcinoma, and squamous cell carcinoma. Doing monthly skin checks is the best way to find new marks or skin changes. Follow the instructions below for checking your skin.   The ABCDEs of checking moles for melanoma   Check your moles or growths for signs of melanoma using ABCDE:   Asymmetry: the sides of the mole or growth don t match  Border: the edges are ragged, notched, or blurred  Color: the color within the mole or growth varies  Diameter: the mole or growth is larger than 6 mm (size of a pencil eraser)  Evolving: the size, shape, or color of the mole or growth is changing (evolving is not shown in the images below)    Checking for other types of skin cancer  Basal cell carcinoma or squamous cell carcinoma have symptoms such as:     A spot or mole that looks different from all other marks on your skin  Changes in how an area feels, such as itching, tenderness, or pain  Changes in the skin's surface, such as oozing, bleeding, or scaliness  A sore that does not heal  New  swelling or redness beyond the border of a mole    Who s at risk?  Anyone can get skin cancer. But you are at greater risk if you have:   Fair skin, light-colored hair, or light-colored eyes  Many moles or abnormal moles on your skin  A history of sunburns from sunlight or tanning beds  A family history of skin cancer  A history of exposure to radiation or chemicals  A weakened immune system  If you have had skin cancer in the past, you are at risk for recurring skin cancer.   How to check your skin  Do your monthly skin checkups in front of a full-length mirror. Check all parts of your body, including your:   Head (ears, face, neck, and scalp)  Torso (front, back, and sides)  Arms (tops, undersides, upper, and lower armpits)  Hands (palms, backs, and fingers, including under the nails)  Buttocks and genitals  Legs (front, back, and sides)  Feet (tops, soles, toes, including under the nails, and between toes)  If you have a lot of moles, take digital photos of them each month. Make sure to take photos both up close and from a distance. These can help you see if any moles change over time.   Most skin changes are not cancer. But if you see any changes in your skin, call your doctor right away. Only he or she can diagnose a problem. If you have skin cancer, seeing your doctor can be the first step toward getting the treatment that could save your life.   [employer] last reviewed this educational content on 4/1/2019 2000-2020 [employer]. [address] [place] [address], [place] [address]. All rights reserved. This information is not intended as a substitute for professional medical care. Always follow your healthcare professional's instructions.       When should I call my doctor?  If you are worsening or not improving, please, contact us or seek urgent care as noted below.     Who should I call with questions (adults)?    [facility] [place] [phone]  For urgent needs outside of  business hours call the [facility] at [phone] and ask for the dermatology resident on call to be paged  If this is a medical emergency and you are unable to reach an ER, Call [phone]      If you need a prescription refill, please contact your pharmacy. Refills are approved or denied by our Physicians during normal business hours, Monday through Fridays  Per office policy, refills will not be granted if you have not been seen within the past year (or sooner depending on your child's condition)

## 2024-08-01 NOTE — PROGRESS NOTES
HPI:   Shirlene Rodriguez is a 44 year old female who presents for Full skin cancer screening   Last Skin Exam: 1 year ago      1st Baseline: No  Personal HX of Skin Cancer: no   Personal HX of Malignant Melanoma: no   Family HX of Skin Cancer / Malignant Melanoma: no  Personal HX of Atypical Moles:  Yes moderately atypical nevus 2018  Risk factors: regular sun exposure  New / Changing lesions: no  Social History: works at [employer]  On review of systems, there are no further skin complaints, patient is feeling otherwise well.  See patient intake sheet.  ROS of the following were done and are negative: Constitutional, Eyes, Ears, Nose,   Mouth, Throat, Cardiovascular, Respiratory, GI, Genitourinary, Musculoskeletal,   Psychiatric, Endocrine, Allergic/Immunologic.      PHYSICAL EXAM:   There were no vitals taken for this visit.  Skin exam performed as follows: Type 2 skin. Mood appropriate  Alert and Oriented X 3. Well developed, well nourished in no distress.  General appearance: Normal  Head including face: Normal  Eyes: conjunctiva and lids: Normal  Mouth: Lips, teeth, gums: Normal  Neck: Normal  Chest-breast/axillae: Normal  Back: Normal  Spleen and liver: Normal  Cardiovascular: Exam of peripheral vascular system by observation for swelling, varicosities, edema: Normal  Genitalia: groin, buttocks: Normal  Extremities: digits/nails (clubbing): Normal  Eccrine and Apocrine glands: Normal  Right upper extremity: Normal  Left upper extremity: Normal  Right lower extremity: Normal  Left lower extremity: Normal  Skin: Scalp and body hair: See below    Pt deferred exam of breasts, groin, buttocks: No    Other physical findings:  1. Multiple pigmented macules on extremities and trunk  2. Multiple pigmented macules on face, trunk and extremities  3. Multiple vascular papules on trunk, arms and legs  4. Multiple scattered keratotic plaques  5. Background erythema; telangiectasias on the nose       Except as noted above, no  other signs of skin cancer or melanoma.     ASSESSMENT/PLAN:   Benign Full skin cancer screening today. Patient with history of a moderately atypical nevus in 2018  Advised on monthly self exams and 1 year  Patient Education: Appropriate brochures given.    Multiple benign appearing nevi on arms, legs and trunk. Discussed ABCDEs of melanoma and sunscreen.   Multiple lentigos on arms, legs and trunk. Advised benign, no treatment needed.  Multiple scattered angiomas. Advised benign, no treatment needed.   Seborrheic keratosis on arms, legs and trunk. Advised benign, no treatment needed.  Hypotrichosis  --Continue Latisse every day   Acne Vulgaris, acne rosacea - doing well on tretinoin daily  --Continue tretinoin 0.025% cream QHS          Follow-up: yearly if doing well/PRN sooner    1.) Patient was asked about new and changing moles. YES  2.) Patient received a complete physical skin examination: YES  3.) Patient was counseled to perform a monthly self skin examination: YES  Scribed By: Sasha Hughes, MS, PAKARLIE

## 2024-08-31 ENCOUNTER — HEALTH MAINTENANCE LETTER (OUTPATIENT)
Age: 44
End: 2024-08-31

## 2024-09-05 ENCOUNTER — LAB (OUTPATIENT)
Dept: LAB | Facility: CLINIC | Age: 44
End: 2024-09-05
Payer: COMMERCIAL

## 2024-09-05 DIAGNOSIS — E03.9 ACQUIRED HYPOTHYROIDISM: ICD-10-CM

## 2024-09-05 DIAGNOSIS — K76.0 NAFLD (NONALCOHOLIC FATTY LIVER DISEASE): ICD-10-CM

## 2024-09-05 DIAGNOSIS — N95.1 PERIMENOPAUSAL: ICD-10-CM

## 2024-09-05 LAB
ALBUMIN SERPL BCG-MCNC: 4.9 G/DL (ref 3.5–5.2)
ALP SERPL-CCNC: 96 U/L (ref 40–150)
ALT SERPL W P-5'-P-CCNC: 71 U/L (ref 0–50)
AST SERPL W P-5'-P-CCNC: 36 U/L (ref 0–45)
BILIRUB DIRECT SERPL-MCNC: <0.2 MG/DL (ref 0–0.3)
BILIRUB SERPL-MCNC: 0.4 MG/DL
PROT SERPL-MCNC: 7.5 G/DL (ref 6.4–8.3)
TSH SERPL DL<=0.005 MIU/L-ACNC: 1.25 UIU/ML (ref 0.3–4.2)

## 2024-09-05 PROCEDURE — 83001 ASSAY OF GONADOTROPIN (FSH): CPT

## 2024-09-05 PROCEDURE — 80076 HEPATIC FUNCTION PANEL: CPT

## 2024-09-05 PROCEDURE — 36415 COLL VENOUS BLD VENIPUNCTURE: CPT

## 2024-09-05 PROCEDURE — 84443 ASSAY THYROID STIM HORMONE: CPT

## 2024-09-06 DIAGNOSIS — E03.9 ACQUIRED HYPOTHYROIDISM: ICD-10-CM

## 2024-09-06 LAB — FSH SERPL IRP2-ACNC: 11.2 MIU/ML

## 2024-09-06 RX ORDER — LEVOTHYROXINE SODIUM 100 UG/1
100 TABLET ORAL DAILY
Qty: 90 TABLET | Refills: 3 | Status: SHIPPED | OUTPATIENT
Start: 2024-09-06

## 2024-09-09 ENCOUNTER — MYC MEDICAL ADVICE (OUTPATIENT)
Dept: FAMILY MEDICINE | Facility: CLINIC | Age: 44
End: 2024-09-09
Payer: COMMERCIAL

## 2024-09-09 DIAGNOSIS — F41.1 GAD (GENERALIZED ANXIETY DISORDER): ICD-10-CM

## 2024-09-09 RX ORDER — VENLAFAXINE HYDROCHLORIDE 37.5 MG/1
37.5 CAPSULE, EXTENDED RELEASE ORAL DAILY
Qty: 90 CAPSULE | Refills: 1 | Status: SHIPPED | OUTPATIENT
Start: 2024-09-09

## 2024-09-09 ASSESSMENT — ANXIETY QUESTIONNAIRES
GAD7 TOTAL SCORE: 1
7. FEELING AFRAID AS IF SOMETHING AWFUL MIGHT HAPPEN: NOT AT ALL
8. IF YOU CHECKED OFF ANY PROBLEMS, HOW DIFFICULT HAVE THESE MADE IT FOR YOU TO DO YOUR WORK, TAKE CARE OF THINGS AT HOME, OR GET ALONG WITH OTHER PEOPLE?: NOT DIFFICULT AT ALL
GAD7 TOTAL SCORE: 1
GAD7 TOTAL SCORE: 1

## 2024-10-09 ENCOUNTER — HOSPITAL ENCOUNTER (OUTPATIENT)
Dept: MAMMOGRAPHY | Facility: CLINIC | Age: 44
Discharge: HOME | End: 2024-10-09
Attending: PHYSICIAN ASSISTANT | Admitting: PHYSICIAN ASSISTANT
Payer: COMMERCIAL

## 2024-10-09 DIAGNOSIS — Z12.31 VISIT FOR SCREENING MAMMOGRAM: ICD-10-CM

## 2024-10-09 PROCEDURE — 77063 BREAST TOMOSYNTHESIS BI: CPT

## 2025-06-03 ENCOUNTER — PATIENT OUTREACH (OUTPATIENT)
Dept: CARE COORDINATION | Facility: CLINIC | Age: 45
End: 2025-06-03
Payer: COMMERCIAL

## 2025-06-17 ENCOUNTER — PATIENT OUTREACH (OUTPATIENT)
Dept: CARE COORDINATION | Facility: CLINIC | Age: 45
End: 2025-06-17
Payer: COMMERCIAL

## 2025-07-28 ENCOUNTER — OFFICE VISIT (OUTPATIENT)
Dept: FAMILY MEDICINE | Facility: CLINIC | Age: 45
End: 2025-07-28
Payer: COMMERCIAL

## 2025-07-28 VITALS
HEART RATE: 80 BPM | RESPIRATION RATE: 16 BRPM | TEMPERATURE: 98 F | BODY MASS INDEX: 31.72 KG/M2 | SYSTOLIC BLOOD PRESSURE: 120 MMHG | HEIGHT: 65 IN | OXYGEN SATURATION: 98 % | WEIGHT: 190.4 LBS | DIASTOLIC BLOOD PRESSURE: 78 MMHG

## 2025-07-28 DIAGNOSIS — K76.0 NAFLD (NONALCOHOLIC FATTY LIVER DISEASE): ICD-10-CM

## 2025-07-28 DIAGNOSIS — E03.9 ACQUIRED HYPOTHYROIDISM: ICD-10-CM

## 2025-07-28 DIAGNOSIS — R73.09 ABNORMAL GLUCOSE: ICD-10-CM

## 2025-07-28 DIAGNOSIS — Z00.00 ROUTINE GENERAL MEDICAL EXAMINATION AT A HEALTH CARE FACILITY: Primary | ICD-10-CM

## 2025-07-28 LAB
ALBUMIN SERPL BCG-MCNC: 4.6 G/DL (ref 3.5–5.2)
ALP SERPL-CCNC: 91 U/L (ref 40–150)
ALT SERPL W P-5'-P-CCNC: 70 U/L (ref 0–50)
ANION GAP SERPL CALCULATED.3IONS-SCNC: 13 MMOL/L (ref 7–15)
AST SERPL W P-5'-P-CCNC: 39 U/L (ref 0–45)
BILIRUB SERPL-MCNC: 0.4 MG/DL
BUN SERPL-MCNC: 8 MG/DL (ref 6–20)
CALCIUM SERPL-MCNC: 9.6 MG/DL (ref 8.8–10.4)
CHLORIDE SERPL-SCNC: 100 MMOL/L (ref 98–107)
CREAT SERPL-MCNC: 0.8 MG/DL (ref 0.51–0.95)
EGFRCR SERPLBLD CKD-EPI 2021: >90 ML/MIN/1.73M2
EST. AVERAGE GLUCOSE BLD GHB EST-MCNC: 103 MG/DL
GLUCOSE SERPL-MCNC: 90 MG/DL (ref 70–99)
HBA1C MFR BLD: 5.2 % (ref 0–5.6)
HCO3 SERPL-SCNC: 25 MMOL/L (ref 22–29)
POTASSIUM SERPL-SCNC: 4.7 MMOL/L (ref 3.4–5.3)
PROT SERPL-MCNC: 7.4 G/DL (ref 6.4–8.3)
SODIUM SERPL-SCNC: 138 MMOL/L (ref 135–145)
TSH SERPL DL<=0.005 MIU/L-ACNC: 0.61 UIU/ML (ref 0.3–4.2)

## 2025-07-28 PROCEDURE — 84443 ASSAY THYROID STIM HORMONE: CPT | Performed by: FAMILY MEDICINE

## 2025-07-28 PROCEDURE — 99396 PREV VISIT EST AGE 40-64: CPT | Performed by: FAMILY MEDICINE

## 2025-07-28 PROCEDURE — 36415 COLL VENOUS BLD VENIPUNCTURE: CPT | Performed by: FAMILY MEDICINE

## 2025-07-28 PROCEDURE — 83036 HEMOGLOBIN GLYCOSYLATED A1C: CPT | Performed by: FAMILY MEDICINE

## 2025-07-28 PROCEDURE — 80053 COMPREHEN METABOLIC PANEL: CPT | Performed by: FAMILY MEDICINE

## 2025-07-28 PROCEDURE — 3074F SYST BP LT 130 MM HG: CPT | Performed by: FAMILY MEDICINE

## 2025-07-28 PROCEDURE — 3078F DIAST BP <80 MM HG: CPT | Performed by: FAMILY MEDICINE

## 2025-07-28 SDOH — HEALTH STABILITY: PHYSICAL HEALTH: ON AVERAGE, HOW MANY MINUTES DO YOU ENGAGE IN EXERCISE AT THIS LEVEL?: 40 MIN

## 2025-07-28 SDOH — HEALTH STABILITY: PHYSICAL HEALTH: ON AVERAGE, HOW MANY DAYS PER WEEK DO YOU ENGAGE IN MODERATE TO STRENUOUS EXERCISE (LIKE A BRISK WALK)?: 3 DAYS

## 2025-07-28 ASSESSMENT — SOCIAL DETERMINANTS OF HEALTH (SDOH): HOW OFTEN DO YOU GET TOGETHER WITH FRIENDS OR RELATIVES?: TWICE A WEEK

## 2025-07-28 NOTE — PROGRESS NOTES
"Preventive Care Visit  [facility] BASIA Weber DO, Family Medicine  Jul 28, 2025  {Provider  Link to SmartSet :545164}    {PROVIDER CHARTING PREFERENCE:432855}    Molly Garber is a 45 year old, presenting for the following:  Physical        7/28/2025     1:19 PM   Additional Questions   Roomed by Kristin SORIANO   Accompanied by Self           HPI  No concerns    Mirena for heavy periods.does have tubal ligations.      Was on venlafaxine for anxiety.has since stopped and is going \"more natural\" using some herbal supplements. Declines therapy          Advance Care Planning  {The storyboard will display whether the patient has ACP docs on file. Hover over the Code section in the storyboard to access the ACP documents. :342403}  Discussed advance care planning with patient; informed AVS has link to [facility] Choices.        7/28/2025   General Health   How would you rate your overall physical health? (!) FAIR   Feel stress (tense, anxious, or unable to sleep) To some extent   (!) STRESS CONCERN      7/28/2025   Nutrition   Three or more servings of calcium each day? Yes   Diet: Regular (no restrictions)   How many servings of fruit and vegetables per day? (!) 2-3   How many sweetened beverages each day? 0-1         7/28/2025   Exercise   Days per week of moderate/strenous exercise 3 days   Average minutes spent exercising at this level 40 min         7/28/2025   Social Factors   Frequency of gathering with friends or relatives Twice a week   Worry food won't last until get money to buy more No   Food not last or not have enough money for food? No   Do you have housing? (Housing is defined as stable permanent housing and does not include staying outside in a car, in a tent, in an abandoned building, in an overnight shelter, or couch-surfing.) Yes   Are you worried about losing your housing? No   Lack of transportation? No   Unable to get utilities (heat,electricity)? No         7/28/2025   Dental "   Dentist two times every year? Yes         Today's PHQ-2 Score:       7/28/2025    12:53 PM   PHQ-2 ( 1999 [employer])   Q1: Little interest or pleasure in doing things 0   Q2: Feeling down, depressed or hopeless 0   PHQ-2 Score 0    Q1: Little interest or pleasure in doing things Not at all   Q2: Feeling down, depressed or hopeless Not at all   PHQ-2 Score 0       Patient-reported           7/28/2025   Substance Use   Alcohol more than 3/day or more than 7/wk No   Do you use any other substances recreationally? No     Social History     Tobacco Use    Smoking status: Never    Smokeless tobacco: Never   Vaping Use    Vaping status: Never Used   Substance Use Topics    Alcohol use: Yes     Comment: rare    Drug use: No     {Provider  If there are gaps in the social history shown above, please follow the link to update and then refresh the note Link to Social and Substance History :979791}      10/9/2024   LAST FHS-7 RESULTS   1st degree relative breast or ovarian cancer No   Any relative bilateral breast cancer No   Any male have breast cancer No   Any ONE woman have BOTH breast AND ovarian cancer No   Any woman with breast cancer before 50yrs No   2 or more relatives with breast AND/OR ovarian cancer No   2 or more relatives with breast AND/OR bowel cancer No     {If any of the questions to the FHS7 are answered yes, consider referral for genetic counseling.    Additional indications for genetic referral include personal history of breast or ovarian cancer, genetic mutation in 1st degree relative which increases risk of breast cancer including BRCA1, BRCA2, ELLIS, PALB 2, TP53, CHEK2, PTEN, CDH1, STK11 (per ACS) and/or 1st degree relative with history of pancreatic or high-risk prostate cancer (per NCCN):817286}   Mammogram Screening - {Patient age 40-74:441487}        7/28/2025   STI Screening   New sexual partner(s) since last STI/HIV test? No     History of abnormal Pap smear: YES - reflected in Problem List and  "Health Maintenance accordingly        Latest Ref Rng & Units 5/15/2023     2:49 PM 4/24/2018    12:57 PM 4/24/2018    12:00 PM   PAP / HPV   PAP  Negative for Intraepithelial Lesion or Malignancy (NILM)      PAP (Historical)   NIL     HPV 16 DNA Negative Negative   Negative    HPV 18 DNA Negative Negative   Negative    Other HR HPV Negative Negative   Negative      ASCVD Risk   The 10-year ASCVD risk score (Aurelia SALES, et al., 2019) is: 0.4%    Values used to calculate the score:      Age: 45 years      Sex: Female      Is Non- : No      Diabetic: No      Tobacco smoker: No      Systolic Blood Pressure: 120 mmHg      Is BP treated: No      HDL Cholesterol: 85 mg/dL      Total Cholesterol: 215 mg/dL    {Provider  REQUIRED FOR AWV Use the storyboard to review patient history, after sections have been marked as reviewed, refresh note to capture documentation:719545}   Reviewed and updated as needed this visit by Provider   Tobacco  Allergies  Meds                {HISTORY OPTIONS (Optional):141439}    {ROS Picklists (Optional):782910}     Objective    Exam  /78   Pulse 80   Temp 98  F (36.7  C) (Tympanic)   Resp 16   Ht 1.638 m (5' 4.5\")   Wt 86.4 kg (190 lb 6.4 oz)   SpO2 98%   BMI 32.18 kg/m     Estimated body mass index is 32.18 kg/m  as calculated from the following:    Height as of this encounter: 1.638 m (5' 4.5\").    Weight as of this encounter: 86.4 kg (190 lb 6.4 oz).    Physical Exam  {Exam Choices (Optional):990188}        Signed Electronically by: Dunia Weber DO  {Email feedback regarding this note to [email or web address]   :685950}  " "Gallstones 2020    Added automatically from request for surgery [id number]    GERD (gastroesophageal reflux disease) 2009    History of  2012    Major depression in complete remission 2012    Uncomplicated asthma     Unspecified asthma(493.90)     Unspecified hypothyroidism 2005     Past Surgical History:   Procedure Laterality Date    CARDIAC STRESS TST,COMPLETE  2009    Normal     SECTION  2012    Procedure:  SECTION;   SECTION;  Surgeon: Warner Ac MD;  Location: [facility]     SECTION, TUBAL LIGATION, COMBINED N/A 2016    Procedure: COMBINED  SECTION, TUBAL LIGATION;  Surgeon: Warner Ac MD;  Location: [facility]    GYN SURGERY  2012    C section    LAPAROSCOPIC CHOLECYSTECTOMY N/A 10/06/2020    Procedure: laparoscopic cholecystectomy and removal of abdominal wall mass;  Surgeon: Dipak Cornelius MD;  Location: [facility] OR    SURGICAL HISTORY OF -   2004    root canal         Review of Systems  Constitutional, HEENT, cardiovascular, pulmonary, gi and gu systems are negative, except as otherwise noted.     Objective    Exam  /78   Pulse 80   Temp 98  F (36.7  C) (Tympanic)   Resp 16   Ht 1.638 m (5' 4.5\")   Wt 86.4 kg (190 lb 6.4 oz)   SpO2 98%   BMI 32.18 kg/m     Estimated body mass index is 32.18 kg/m  as calculated from the following:    Height as of this encounter: 1.638 m (5' 4.5\").    Weight as of this encounter: 86.4 kg (190 lb 6.4 oz).    Physical Exam  GENERAL: alert and no distress  EYES: Eyes grossly normal to inspection, PERRL and conjunctivae and sclerae normal  NECK: no adenopathy, no asymmetry, masses, or scars  RESP: lungs clear to auscultation - no rales, rhonchi or wheezes  CV: regular rate and rhythm, normal S1 S2, no S3 or S4, no murmur, click or rub, no peripheral edema  ABDOMEN: soft, nontender, no hepatosplenomegaly, no masses and bowel sounds normal  MS: no gross " musculoskeletal defects noted, no edema  NEURO: Normal strength and tone, mentation intact and speech normal  PSYCH: mentation appears normal, affect normal/bright        Signed Electronically by: Dunia Weber DO

## 2025-07-28 NOTE — PATIENT INSTRUCTIONS
"Patient Education   Preventive Care Advice   This is general advice we often give to help people stay healthy. Your care team may have specific advice just for you. Please talk to your care team about your own preventive care needs.  Lifestyle  Exercise at least 150 minutes each week (30 minutes a day, 5 days a week).  Do muscle strengthening activities 2 days a week. These help control your weight and prevent disease.  No smoking.  Wear sunscreen to prevent skin cancer.  Take time with family and friends.  Have your home tested for radon every 2 to 5 years. Radon is a colorless, odorless gas that can harm your lungs. To learn more, go to www.health.[facility].[place].us and search for \"Radon in Homes.\"  Keep guns unloaded and locked up in a safe place like a safe or gun vault, or, use a gun lock and hide the keys. Always lock away bullets separately. To learn more, visit [employer].[place].gov and search for \"safe gun storage.\"  Nutrition  Eat 5 or more servings of fruits and vegetables each day.  Try wheat bread, brown rice and whole grain pasta (instead of white bread, rice, and pasta).  Get enough calcium and vitamin D. Check the label on foods and aim for 100% of the RDA (recommended daily allowance).  Regular exams  Have a dental exam and cleaning every 6 months.  Older adults: Ask your care team how often to have memory testing.  See your health care team every year to talk about:  Any changes in your health.  Any medicines your care team has prescribed.  Preventive care, family planning, and ways to prevent chronic diseases.  Shots (vaccines)   HPV shots (up to age 26), if you've never had them before.  Hepatitis B shots (up to age 59), if you've never had them before.  COVID-19 shot: Get this shot when it's due.  Flu shot: Get a flu shot every year.  Tetanus shot: Get a tetanus shot every 10 years.  Pneumococcal, hepatitis A, and RSV shots: Ask your care team if you need these based on your risk.  Shingles shot (for age 50 and " up).  General health tests  Diabetes screening:  Starting at age 35, Get screened for diabetes at least every 3 years.  If you are younger than age 35, ask your care team if you should be screened for diabetes.  Cholesterol test: At age 39, start having a cholesterol test every 5 years, or more often if advised.  Bone density scan (DEXA): At age 50, ask your care team if you should have this scan for osteoporosis (brittle bones).  Hepatitis C: Get tested at least once in your life.  Abdominal aortic aneurysm screening: Talk to your doctor about having this screening if you:  Have ever smoked; and  Are biologically male; and  Are between the ages of 65 and 75.  STIs (sexually transmitted infections)  Before age 24: Ask your care team if you should be screened for STIs.  After age 24: Get screened for STIs if you're at risk. You are at risk for STIs (including HIV) if:  You are sexually active with more than one person.  You don't use condoms every time.  You or a partner was diagnosed with a sexually transmitted infection.  If you are at risk for HIV, ask about PrEP medicine to prevent HIV.  Get tested for HIV at least once in your life, whether you are at risk for HIV or not.  Cancer screening tests  Cervical cancer screening: If you have a cervix, begin getting regular cervical cancer screening tests at age 21. Most people who have regular screenings with normal results can stop after age 65. Talk about this with your provider.  Breast cancer scan (mammogram): If you've ever had breasts, begin having regular mammograms starting at age 40. This is a scan to check for breast cancer.  Colon cancer screening: It is important to start screening for colon cancer at age 45.  Have a colonoscopy test every 10 years (or more often if you're at risk) Or, ask your provider about stool tests like a FIT test every year or Cologuard test every 3 years.  To learn more about your testing options, visit:  [email or web address]/[email or web address].  For help making a decision, visit: maya.mahendra/vk50603.  Prostate cancer screening test: If you have a prostate and are age 55 to 69, ask your provider if you would benefit from a yearly prostate cancer screening test.  Lung cancer screening: If you are a current or former smoker age 50 to 80, ask your care team if ongoing lung cancer screenings are right for you.    For informational purposes only. Not to replace the advice of your health care provider. Copyright   2023 [facility] [employer]. All rights reserved. Clinically reviewed by the [facility]. TEEspy [id number] - REV 6/25.  Learning About Stress  What is stress?     Stress is your body's response to a hard situation. Your body can have a physical, emotional, or mental response. Stress is a fact of life for most people, and it affects everyone differently. What causes stress for you may not be stressful for someone else.  A lot of things can cause stress. You may feel stress when you go on a job interview, take a test, or run a race. This kind of short-term stress is normal and even useful. It can help you if you need to work hard or react quickly. For example, stress can help you finish an important job on time.  Long-term stress is caused by ongoing stressful situations or events. Examples of long-term stress include long-term health problems, ongoing problems at work, or conflicts in your family. Long-term stress can harm your health.  How does stress affect your health?  When you are stressed, your body responds as though you are in danger. It makes hormones that speed up your heart, make you breathe faster, and give you a burst of energy. This is called the fight-or-flight stress response. If the stress is over quickly, your body goes back to normal and no harm is done.  But if stress happens too often or lasts too long, it can have bad effects. Long-term stress can make you more likely to get sick,  and it can make symptoms of some diseases worse. If you tense up when you are stressed, you may develop neck, shoulder, or low back pain. Stress is linked to high blood pressure and heart disease.  Stress also harms your emotional health. It can make you downing, tense, or depressed. Your relationships may suffer, and you may not do well at work or school.  What can you do to manage stress?  You can try these things to help manage stress:   Do something active. Exercise or activity can help reduce stress. Walking is a great way to get started. Even everyday activities such as housecleaning or yard work can help.  Try yoga or aniket chi. These techniques combine exercise and meditation. You may need some training at first to learn them.  Do something you enjoy. For example, listen to music or go to a movie. Practice your hobby or do volunteer work.  Meditate. This can help you relax, because you are not worrying about what happened before or what may happen in the future.  Do guided imagery. Imagine yourself in any setting that helps you feel calm. You can use online videos, books, or a teacher to guide you.  Do breathing exercises. For example:  From a standing position, bend forward from the waist with your knees slightly bent. Let your arms dangle close to the floor.  Breathe in slowly and deeply as you return to a standing position. Roll up slowly and lift your head last.  Hold your breath for just a few seconds in the standing position.  Breathe out slowly and bend forward from the waist.  Let your feelings out. Talk, laugh, cry, and express anger when you need to. Talking with supportive friends or family, a counselor, or a kat leader about your feelings is a healthy way to relieve stress. Avoid discussing your feelings with people who make you feel worse.  Write. It may help to write about things that are bothering you. This helps you find out how much stress you feel and what is causing it. When you know this,  "you can find better ways to cope.  What can you do to prevent stress?  You might try some of these things to help prevent stress:  Manage your time. This helps you find time to do the things you want and need to do.  Get enough sleep. Your body recovers from the stresses of the day while you are sleeping.  Get support. Your family, friends, and community can make a difference in how you experience stress.  Limit your news feed. Avoid or limit time on social media or news that may make you feel stressed.  Do something active. Exercise or activity can help reduce stress. Walking is a great way to get started.  Where can you learn more?  Go to https://www.[employer].net/patiented  Enter N032 in the search box to learn more about \"Learning About Stress.\"  Current as of: October 24, 2024  Content Version: 14.5    6053-2504 [employer].   Care instructions adapted under license by your healthcare professional. If you have questions about a medical condition or this instruction, always ask your healthcare professional. [employer] disclaims any warranty or liability for your use of this information.       "

## 2025-07-30 RX ORDER — LEVOTHYROXINE SODIUM 100 UG/1
100 TABLET ORAL DAILY
Qty: 90 TABLET | Refills: 3 | Status: SHIPPED | OUTPATIENT
Start: 2025-07-30

## 2025-08-07 ENCOUNTER — OFFICE VISIT (OUTPATIENT)
Dept: DERMATOLOGY | Facility: CLINIC | Age: 45
End: 2025-08-07
Payer: COMMERCIAL

## 2025-08-07 DIAGNOSIS — L70.0 ACNE VULGARIS: ICD-10-CM

## 2025-08-07 DIAGNOSIS — L82.1 SEBORRHEIC KERATOSES: ICD-10-CM

## 2025-08-07 DIAGNOSIS — Z12.83 SKIN CANCER SCREENING: ICD-10-CM

## 2025-08-07 DIAGNOSIS — D22.9 MULTIPLE BENIGN NEVI: Primary | ICD-10-CM

## 2025-08-07 DIAGNOSIS — D18.01 CHERRY ANGIOMA: ICD-10-CM

## 2025-08-07 RX ORDER — CLINDAMYCIN PHOSPHATE 10 UG/ML
LOTION TOPICAL DAILY
Qty: 60 ML | Refills: 5 | Status: SHIPPED | OUTPATIENT
Start: 2025-08-07

## 2025-08-07 RX ORDER — TRETINOIN 0.5 MG/G
CREAM TOPICAL AT BEDTIME
Qty: 45 G | Refills: 5 | Status: SHIPPED | OUTPATIENT
Start: 2025-08-07

## (undated) DEVICE — ENDO TROCAR FIRST ENTRY KII FIOS ADV FIX 05X100MM CFF03

## (undated) DEVICE — ENDO TROCAR SLEEVE KII ADV FIXATION 05X100MM CFS02

## (undated) DEVICE — ESU CORD MONOPOLAR 10'  E0510

## (undated) DEVICE — ADH SKIN CLOSURE PREMIERPRO EXOFIN 1.0ML 3470

## (undated) DEVICE — ESU ENDO SCISSORS 5MM CVD 5DCS

## (undated) DEVICE — ENDO TROCAR BLUNT TIP KII BALLOON 12X100MM C0R47

## (undated) DEVICE — STOCKING SLEEVE COMPRESSION CALF LG

## (undated) DEVICE — SOL WATER IRRIG 1000ML BOTTLE 07139-09

## (undated) DEVICE — DRAPE POUCH INSTRUMENT 3 POCKET 1018L

## (undated) DEVICE — DECANTER VIAL 2006S

## (undated) DEVICE — SU VICRYL 3-0 SH 27" J316H

## (undated) DEVICE — CLIP APPLIER ENDO 5MM M/L LIGAMAX EL5ML

## (undated) DEVICE — ENDO POUCH UNIV RETRIEVAL SYSTEM INZII 10MM CD001

## (undated) DEVICE — GOWN XLG DISP 9545

## (undated) DEVICE — SUCTION IRRIGATION STRYKFLOW II W/TIP DISP 250-070-520

## (undated) DEVICE — SU VICRYL 0 UR-6 27" J603H

## (undated) DEVICE — ESU HOLSTER PLASTIC DISP E2400

## (undated) DEVICE — ENDO TROCAR FIRST ENTRY KII FIOS ADV FIX 11X100MM CFF33

## (undated) DEVICE — SU VICRYL 4-0 FS-2 27" J422-H

## (undated) DEVICE — GLOVE PROTEXIS W/NEU-THERA 7.5  2D73TE75

## (undated) DEVICE — Device

## (undated) DEVICE — SOL NACL 0.9% IRRIG 1000ML BOTTLE 07138-09

## (undated) DEVICE — ESU PENCIL W/COATED BLADE E2450H

## (undated) DEVICE — PREP CHLORAPREP 26ML TINTED ORANGE  260815

## (undated) DEVICE — SOL NACL 0.9% IRRIG 3000ML BAG 07972-08

## (undated) RX ORDER — BUPIVACAINE HYDROCHLORIDE AND EPINEPHRINE 2.5; 5 MG/ML; UG/ML
INJECTION, SOLUTION EPIDURAL; INFILTRATION; INTRACAUDAL; PERINEURAL
Status: DISPENSED
Start: 2020-10-06

## (undated) RX ORDER — FENTANYL CITRATE 50 UG/ML
INJECTION, SOLUTION INTRAMUSCULAR; INTRAVENOUS
Status: DISPENSED
Start: 2020-10-06

## (undated) RX ORDER — GABAPENTIN 300 MG/1
CAPSULE ORAL
Status: DISPENSED
Start: 2020-10-06

## (undated) RX ORDER — MAGNESIUM SULFATE HEPTAHYDRATE 40 MG/ML
INJECTION, SOLUTION INTRAVENOUS
Status: DISPENSED
Start: 2020-10-06

## (undated) RX ORDER — HYDROCODONE BITARTRATE AND ACETAMINOPHEN 5; 325 MG/1; MG/1
TABLET ORAL
Status: DISPENSED
Start: 2020-10-06

## (undated) RX ORDER — ONDANSETRON 2 MG/ML
INJECTION INTRAMUSCULAR; INTRAVENOUS
Status: DISPENSED
Start: 2020-10-06

## (undated) RX ORDER — NEOSTIGMINE METHYLSULFATE 1 MG/ML
VIAL (ML) INJECTION
Status: DISPENSED
Start: 2020-10-06

## (undated) RX ORDER — CEFAZOLIN SODIUM 2 G/100ML
INJECTION, SOLUTION INTRAVENOUS
Status: DISPENSED
Start: 2020-10-06

## (undated) RX ORDER — GLYCOPYRROLATE 0.2 MG/ML
INJECTION, SOLUTION INTRAMUSCULAR; INTRAVENOUS
Status: DISPENSED
Start: 2020-10-06

## (undated) RX ORDER — CELECOXIB 200 MG/1
CAPSULE ORAL
Status: DISPENSED
Start: 2020-10-06

## (undated) RX ORDER — ACETAMINOPHEN 325 MG/1
TABLET ORAL
Status: DISPENSED
Start: 2020-10-06

## (undated) RX ORDER — HYDROMORPHONE HYDROCHLORIDE 1 MG/ML
INJECTION, SOLUTION INTRAMUSCULAR; INTRAVENOUS; SUBCUTANEOUS
Status: DISPENSED
Start: 2020-10-06

## (undated) RX ORDER — LIDOCAINE HYDROCHLORIDE 10 MG/ML
INJECTION, SOLUTION EPIDURAL; INFILTRATION; INTRACAUDAL; PERINEURAL
Status: DISPENSED
Start: 2020-10-06

## (undated) RX ORDER — PROPOFOL 10 MG/ML
INJECTION, EMULSION INTRAVENOUS
Status: DISPENSED
Start: 2020-10-06

## (undated) RX ORDER — LIDOCAINE HYDROCHLORIDE 10 MG/ML
INJECTION, SOLUTION INFILTRATION; PERINEURAL
Status: DISPENSED
Start: 2020-10-06

## (undated) RX ORDER — DEXAMETHASONE SODIUM PHOSPHATE 4 MG/ML
INJECTION, SOLUTION INTRA-ARTICULAR; INTRALESIONAL; INTRAMUSCULAR; INTRAVENOUS; SOFT TISSUE
Status: DISPENSED
Start: 2020-10-06